# Patient Record
Sex: MALE | Race: WHITE | NOT HISPANIC OR LATINO | Employment: OTHER | URBAN - METROPOLITAN AREA
[De-identification: names, ages, dates, MRNs, and addresses within clinical notes are randomized per-mention and may not be internally consistent; named-entity substitution may affect disease eponyms.]

---

## 2017-01-23 RX ORDER — ZOLPIDEM TARTRATE 10 MG/1
10 TABLET ORAL NIGHTLY PRN
Qty: 30 TABLET | Refills: 5 | Status: SHIPPED | OUTPATIENT
Start: 2017-01-23 | End: 2017-08-04 | Stop reason: SDUPTHER

## 2017-01-23 NOTE — TELEPHONE ENCOUNTER
Last saw Alison on 05/26/2016.     Called in pt's Rx for zolpidem on 1/24/2017.    Called pt and left a VM notifying him that I called in his Rx to his preferred pharmacy.

## 2017-08-04 ENCOUNTER — OFFICE VISIT (OUTPATIENT)
Dept: SLEEP MEDICINE | Facility: CLINIC | Age: 62
End: 2017-08-04
Payer: COMMERCIAL

## 2017-08-04 VITALS
HEIGHT: 72 IN | WEIGHT: 253.5 LBS | DIASTOLIC BLOOD PRESSURE: 73 MMHG | HEART RATE: 68 BPM | SYSTOLIC BLOOD PRESSURE: 128 MMHG | BODY MASS INDEX: 34.34 KG/M2

## 2017-08-04 DIAGNOSIS — G47.00 INSOMNIA, UNSPECIFIED TYPE: ICD-10-CM

## 2017-08-04 DIAGNOSIS — G47.33 OBSTRUCTIVE SLEEP APNEA: Primary | ICD-10-CM

## 2017-08-04 PROCEDURE — 99999 PR PBB SHADOW E&M-EST. PATIENT-LVL III: CPT | Mod: PBBFAC,,, | Performed by: NURSE PRACTITIONER

## 2017-08-04 PROCEDURE — 99213 OFFICE O/P EST LOW 20 MIN: CPT | Mod: S$GLB,,, | Performed by: NURSE PRACTITIONER

## 2017-08-04 PROCEDURE — 3008F BODY MASS INDEX DOCD: CPT | Mod: S$GLB,,, | Performed by: NURSE PRACTITIONER

## 2017-08-04 RX ORDER — ZOLPIDEM TARTRATE 10 MG/1
10 TABLET ORAL NIGHTLY PRN
Qty: 30 TABLET | Refills: 5 | Status: SHIPPED | OUTPATIENT
Start: 2017-08-04 | End: 2018-04-05 | Stop reason: SDUPTHER

## 2017-08-04 NOTE — PROGRESS NOTES
This 61 y.o. male returns today for management of obstructive sleep apnea and difficulty with insomnia. Annual visit. He continues to use PAP therapy nightly. He got new mask since last seen. Mild occasional mask repositioning.  Uses his chin strap. Denies oral drying. Denies nasal drying. Continued improvement of symptoms. Incd' foot pain in interim, using cane.     Sleep initiation difficulty due to ongoing work stress, rumination; persistent thoughts, mild anxiousness--takes ambien prn. No complex sleep behaviors  He continues to use Ambien prn. Has 2 refills left. It remains effective    ESS=5.       BASELINE SLEEP STUDY 2004 RDI 76.8; O2 lizett 86%, titrated to 12cm. Wt 224.6 lbs     Hx  UPPP, septoplasty, sinus surgery    ROS: In addition to sleep symptoms, +occasional sinus congestion, wgt stable, foot pain, otherwise a balance review of systems is negative.           PHYSICAL EXAM:   Blood pressure 128/73, pulse 68, height 6' (1.829 m), weight 115 kg (253 lb 8.5 oz).  Body mass index is 34.38 kg/m².   W,D, obese, well groomed      IMPRESSION:   1. Obstructive sleep apnea with prior symptoms of snoring and excessive daytime sleepiness. CPAP was emperically increased to 13 cm ~ 2014. He remains objectively adherent, but has trouble during times of allergy flare,  limits his response to CPAP, so was switched to apap 2015. No monitoring after setup. 8/4/17: Continued excellent adherence.  Symptoms remain improved.    Medical co-morbidities: Obesity, allergic rhinitis     2. Insomnia NEC. Underlying stress-induce night time rumination - managed with Ambien    PLAN:  1.Continue Auto CPAP 12-18;  [facility] DME prn supplies. Continue nightly use.    2. Continue Zolpidem 10 mg as needed prior to bedtime. Safe use was discussed.   3 Discussed effectiveness of therapy, and potential ramifications of untreated LACHO, including heart disease, hypertension, cognitive difficulties, stroke, and diabetes.    4. RTC otherwise 12-mos,  sooner if needed.

## 2017-09-12 ENCOUNTER — TELEPHONE (OUTPATIENT)
Dept: ORTHOPEDICS | Facility: CLINIC | Age: 62
End: 2017-09-12

## 2017-09-12 NOTE — TELEPHONE ENCOUNTER
----- Message from Sheryl Dobson MA sent at 9/12/2017  3:10 PM CDT -----  Contact: self   PT had a sx on his left ankle on 03/09/2017, pt is having pain and swelling and seeking a second opinion appt. Pt is wanting to get seen as soon as possible. Pt can be reached at [phone].

## 2017-09-12 NOTE — TELEPHONE ENCOUNTER
----- Message from Sheryl Dobson MA sent at 9/12/2017  3:10 PM CDT -----  Contact: self   PT had a sx on his left ankle on 03/09/2017, pt is having pain and swelling and seeking a second opinion appt. Pt is wanting to get seen as soon as possible. Pt can be reached at [phone].

## 2017-09-12 NOTE — TELEPHONE ENCOUNTER
Spoke with pt.  Advised that PA's cannot provide second medical opinions.  Advised pt no sooner appointments available with Dr Rueda.  Pt is on the wait list.  Pt states he had sx earlier this year.  Developed an infection and was treated by ID.  Pt has also be seen for a second medical opinion by Dr Pang.  Pt states Dr Rueda was highly recommended to him.  Pt will keep his currently scheduled appointment.  Pt will work on getting past medical records from previous surgeon.

## 2017-09-12 NOTE — TELEPHONE ENCOUNTER
----- Message from Ayo Biggs sent at 9/12/2017  2:57 PM CDT -----  Contact: Self/Cell:[phone]  Pt called request an appt with Dr. Rueda in regards to pt's lt ankle swelling and pain. I scheduled pt for 10/26 at 8:30 am to see Dr. Rueda, and added pt to wait list. Pt would still like to be seen sooner, and will take any day, but generally Fridays the pt is free. Pt was also scheduled with Liam Downing due to earlier access, but ultimately pt would prefer Dr. Rueda. Please return pt's call at [phone].

## 2017-10-26 ENCOUNTER — OFFICE VISIT (OUTPATIENT)
Dept: ORTHOPEDICS | Facility: CLINIC | Age: 62
End: 2017-10-26
Payer: COMMERCIAL

## 2017-10-26 ENCOUNTER — HOSPITAL ENCOUNTER (OUTPATIENT)
Dept: RADIOLOGY | Facility: HOSPITAL | Age: 62
Discharge: HOME | End: 2017-10-26
Attending: ORTHOPAEDIC SURGERY
Payer: COMMERCIAL

## 2017-10-26 VITALS — BODY MASS INDEX: 33.18 KG/M2 | HEIGHT: 72 IN | WEIGHT: 245 LBS

## 2017-10-26 DIAGNOSIS — M79.671 BILATERAL FOOT PAIN: ICD-10-CM

## 2017-10-26 DIAGNOSIS — M19.072 ARTHRITIS OF FOOT, LEFT: ICD-10-CM

## 2017-10-26 DIAGNOSIS — M79.672 BILATERAL FOOT PAIN: Primary | ICD-10-CM

## 2017-10-26 DIAGNOSIS — M21.41 PES PLANUS OF BOTH FEET: ICD-10-CM

## 2017-10-26 DIAGNOSIS — M79.671 BILATERAL FOOT PAIN: Primary | ICD-10-CM

## 2017-10-26 DIAGNOSIS — M79.672 BILATERAL FOOT PAIN: ICD-10-CM

## 2017-10-26 DIAGNOSIS — M21.42 PES PLANUS OF BOTH FEET: ICD-10-CM

## 2017-10-26 PROCEDURE — 99203 OFFICE O/P NEW LOW 30 MIN: CPT | Mod: S$GLB,,, | Performed by: ORTHOPAEDIC SURGERY

## 2017-10-26 PROCEDURE — 73630 X-RAY EXAM OF FOOT: CPT | Mod: 26,LT,, | Performed by: RADIOLOGY

## 2017-10-26 PROCEDURE — 73630 X-RAY EXAM OF FOOT: CPT | Mod: 26,RT,, | Performed by: RADIOLOGY

## 2017-10-26 PROCEDURE — 99999 PR PBB SHADOW E&M-EST. PATIENT-LVL II: CPT | Mod: PBBFAC,,, | Performed by: ORTHOPAEDIC SURGERY

## 2017-10-26 PROCEDURE — 73630 X-RAY EXAM OF FOOT: CPT | Mod: 50,TC

## 2017-10-26 NOTE — PROGRESS NOTES
CC: bilateral foot pain L>R    HPI: Yuriy Jerome is a 62 y.o. male  presents with bilateral foot pain with the left being more severe. He has hx of flat feet and posterior tibial tendon dysfunction which he was being trx by Dr. Hawley. He underwent a subtalar arthroreisis on 3/2017 with an implant that dislodged and was removed on 5/2017. He then developed an infection which he was seen for via ID and underwent abx trx.  His pain is achy in nature and sharp at times. 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 He has tried custom orthotics in the past with minimal to not relief. He denies any trauma, fever, or chills.     PAST MEDICAL HISTORY:   Past Medical History:   Diagnosis Date    Allergy     GERD (gastroesophageal reflux disease)     Sleep apnea     Staphylococcal infection     left ankle, 2017     PAST SURGICAL HISTORY:   Past Surgical History:   Procedure Laterality Date    FOOT SURGERY Left 05/2017    subtalar implant, 6 weeks later removed due to failure    HERNIA REPAIR      DOUBLE age 8    KNEE SURGERY Bilateral 05/2011    TKR, had ligament replacement in right , meniscus repair both knees    LAMINECTOMY  1989    spinal    UVULOPALATOPHARYNGOPLASTY  2007     FAMILY HISTORY: No family history on file.  SOCIAL HISTORY:   Social History     Social History    Marital status:      Spouse name: N/A    Number of children: N/A    Years of education: N/A     Occupational History    Not on file.     Social History Main Topics    Smoking status: Never Smoker    Smokeless tobacco: Never Used    Alcohol use Yes    Drug use: No    Sexual activity: Not on file     Other Topics Concern    Not on file     Social History Narrative    No narrative on file       MEDICATIONS:   Current Outpatient Prescriptions:     finasteride (PROSCAR) 5 mg tablet, , Disp: , Rfl:     losartan-hydrochlorothiazide 100-12.5 mg (HYZAAR) 100-12.5 mg Tab, Take 1 tablet by mouth  once daily., Disp: , Rfl:     zolpidem (AMBIEN) 10 mg Tab, Take 1 tablet (10 mg total) by mouth nightly as needed., Disp: 30 tablet, Rfl: 5  ALLERGIES: Review of patient's allergies indicates:  No Known Allergies    VITAL SIGNS: Ht 6' (1.829 m)   Wt 111.1 kg (245 lb)   BMI 33.23 kg/m²      Review of Systems   Constitution: Negative. Negative for chills, fever and night sweats.   HENT: Negative for congestion and headaches.    Eyes: Negative for blurred vision, left vision loss and right vision loss.   Cardiovascular: Negative for chest pain and syncope.   Respiratory: Negative for cough and shortness of breath.     Hematologic/Lymphatic: Negative for bleeding problem. Does not bruise/bleed easily.   Skin: Negative for dry skin, itching and rash.   Musculoskeletal: Negative for falls and muscle weakness.   Neurological: Negative for disturbances in coordination, loss of balance and seizures.    Allergic/Immunologic: Negative for hives and persistent infections.       Physical Exam   Constitutional: Oriented to person, place, and time. Appears well-developed and well-nourished.   Head: Normocephalic and atraumatic.   Nose: Nose normal.   Eyes: No scleral icterus.   Neck: Normal range of motion. Neck supple.   Cardiovascular: Normal rate, rythm  Pulm: Breath sounds clear. No audible wheezing   Pulses:DP are 2+ on the right side, and 2+ on the left side.   Neurological: Alert and oriented to person, place, and time.   Skin: Skin is warm. lateral incision over the left foot is well healed  Psychiatric: Normal mood and affect.   Musc: Bilateral foot deformity noted with flat feet L>R noted. Unable to perform single heal raise bilaterally. TTP along the PTT bilaterally. Left hind foot valgus is rigid. Right is correctable. Some pain with ROM of the ankle and subtalar joint which is also decreased on the left.     Imaging:  Xray of the feet bilaterally showing flat feet with subtalar and mid foot arthritis bilaterally.  L>R    Assessment/Plan  Yuriy Jerome is a 62 y.o. male with significant PTTD L>R with hx of infection and previous surgery on the left. Due to him being rigid and his arthritis he would need a triple fusion on the left. He will start with custom orthotics and call us if he wishes to proceed. If he does wish to proceed he would need to see his ID physician to get pre and post operative abx recs.     1. Bilateral foot pain  X-Ray Foot Complete Bilateral   2. Arthritis of foot, left     3. Pes planus of both feet       I have personally taken the history and examined this patient and agree with the residents note as stated above.  Stage 3 left posterior tibial tendon rupture with severe pes planovalgus and hindfoot arthritis.  Recent surgery with sinus tarsi implant failed with subsequent infection with question of bony involvement.  If he wishes to proceed with further surgery, triple arthrodesis, I would recommend pre op evaluation by his infectious disease doctor for perioperative recommendations.

## 2018-03-20 ENCOUNTER — OFFICE VISIT (OUTPATIENT)
Dept: OTOLARYNGOLOGY | Facility: CLINIC | Age: 63
End: 2018-03-20
Payer: COMMERCIAL

## 2018-03-20 VITALS
SYSTOLIC BLOOD PRESSURE: 134 MMHG | HEART RATE: 59 BPM | TEMPERATURE: 95 F | DIASTOLIC BLOOD PRESSURE: 80 MMHG | WEIGHT: 255.06 LBS | BODY MASS INDEX: 34.59 KG/M2

## 2018-03-20 DIAGNOSIS — E04.1 THYROID NODULE: ICD-10-CM

## 2018-03-20 DIAGNOSIS — R59.0 REACTIVE CERVICAL LYMPHADENOPATHY: ICD-10-CM

## 2018-03-20 DIAGNOSIS — K08.89 PAIN, DENTAL: Primary | ICD-10-CM

## 2018-03-20 PROCEDURE — 99244 OFF/OP CNSLTJ NEW/EST MOD 40: CPT | Mod: S$GLB,,, | Performed by: OTOLARYNGOLOGY

## 2018-03-20 PROCEDURE — 99999 PR PBB SHADOW E&M-EST. PATIENT-LVL III: CPT | Mod: PBBFAC,,, | Performed by: OTOLARYNGOLOGY

## 2018-03-20 NOTE — PROGRESS NOTES
Head and Neck Surgery Consult    Seen in consultation from Dr. Hoang    HPI: Yuriy Jerome is a 62 y.o. male presenting with tooth pain after a partial crown. After this procedure he had facial pain and swelling with referred pain to his R ear and neck. He initially had tender lymphadenopathy of his right neck but this has resolved after his dentist Rx antibiotics. He denies fever/chills or bad taste in mouth. He has a remote history of sialadenitis and sialolithiasis on this side, he is unsure if the stone was in his submandibular or parotid gland. He also has a history of a thyroid nodule with benign cytology - all workup for this was at [facility] and they recommended surveillance, but he would like to see our endocrinologists here to continue surveillance. He is asking if further dental manipulation or a root canal would alleviate his pain.    Past Medical History:   Diagnosis Date    Allergy     GERD (gastroesophageal reflux disease)     Sleep apnea     Staphylococcal infection     left ankle, 2017       Past Surgical History:   Procedure Laterality Date    FOOT SURGERY Left 05/2017    subtalar implant, 6 weeks later removed due to failure    HERNIA REPAIR      DOUBLE age 8    KNEE SURGERY Bilateral 05/2011    TKR, had ligament replacement in right , meniscus repair both knees    LAMINECTOMY  1989    spinal    UVULOPALATOPHARYNGOPLASTY  2007         Current Outpatient Prescriptions:     finasteride (PROSCAR) 5 mg tablet, , Disp: , Rfl:     losartan-hydrochlorothiazide 100-12.5 mg (HYZAAR) 100-12.5 mg Tab, Take 1 tablet by mouth once daily., Disp: , Rfl:     zolpidem (AMBIEN) 10 mg Tab, Take 1 tablet (10 mg total) by mouth nightly as needed., Disp: 30 tablet, Rfl: 5    Review of patient's allergies indicates:  No Known Allergies    History reviewed. No pertinent family history.    Social History     Social History    Marital status:      Spouse name: N/A    Number of children: N/A    Years of  education: N/A     Occupational History    Not on file.     Social History Main Topics    Smoking status: Never Smoker    Smokeless tobacco: Never Used    Alcohol use Yes    Drug use: No    Sexual activity: Not on file     Other Topics Concern    Not on file     Social History Narrative    No narrative on file       Review of Systems -  Constitutional: Denies having night sweats, constant fatigue, loss of appetite or recent substantial weight loss.  Eyes: Denies blurred vision or double vision.  Respiratory: Denies symptoms of shortness of breath, noisy breathing, hoarseness or chronic cough.  GI: Denies symptoms of heartburn, acid regurgitation, or the known presence of a hiatal hernia.  The remainder of a 10-point review of systems is negative    REVIEW OF RADIOLOGICAL FILMS AND RECORDS (PERSONALLY REVIEWED):  noncontributory    PHYSICAL EXAM:  Vitals - /80   Pulse (!) 59   Temp (!) 95.2 °F (35.1 °C)   Wt 115.7 kg (255 lb 1.2 oz)   BMI 34.59 kg/m²   Constitutional -      General Appearance: well developed, well nourished, without obvious deformities     Communication: speaks with a normal voice without hoarseness  Head & Face -     Overall: no obvious scars, lesions or masses     Parotid and submandibular glands: no masses or tenderness     Facial strength: normal and equal bilaterally  Eyes -      EOM intact  Ear, Nose, Mouth & Throat -     Ears: both left and right external auditory canals and TM's are normal, no external deformities     Nasal exam: mucosa is pink, septum is midline, visible turbinates are normal on anterior rhinoscopy     Mastication: teeth appear in good repair, some crowns evident. No loose or frankly chipped teeth. Angle I occlusion.     Oral Cavity and oropharynx: mucosa, hard and soft palates, tongue, posterior pharyngeal wall, lips and gums are without lesions. Tonsils appear absent. UPPP changes evident. Clear saliva expressed from [place] and [place] ducts bilaterally.  No palpable stone.  Respiratory:     Breathing unlabored  Larynx: using the mirror for indirect laryngoscopy, the epiglottic, false cords, true cords, and pyriform sinuses are without lesions and the true vocal cords move normally     Neck: appears symmetric, and on palpation is without masses or lymphadenopathy     Thyroid: no asymmetry, thyromegaly, or thyroid nodules on palpation  Cranial Nerves:      II: Pupillary reflexes normal     III, IV, VI: EOM normal     V: 1,2,3: normal sensation     VII: Normal strength in all divisions     IX, X: Normal voice, palatal elevation and sensation     XI: Shoulder strength normal       XII: Tongue mobility normal  Psychiatric:     Appropriate affect    ASSESSMENT: Reactive LAD, resolving    PLAN: Discussed the physiology of reactive lymphadenopathy and that this may persist until the underlying cause of inflammation (i.e. His dental disease) is controlled. I do not know if a root canal or what further dental interventions may be appropriate, this is outside my scope of practice and I encouraged him to follow up with his dentist. I do not see any signs of sialolithiasis or sialadenitis today.I will also refer him to our endocrinology clinic for his thyroid nodule.       Emory Cameron

## 2018-03-20 NOTE — LETTER
March 20, 2018      Yovani Hoang MD  [address]  [place] [address]           [facility]  [address]  [facility] [address]  Phone: [phone]  Fax: [phone]          Patient: Yuriy Jerome   MR Number: [id number]   YOB: 1955   Date of Visit: 3/20/2018       Dear Dr. Yovani Hoang:    Thank you for referring Yuriy Jerome to me for evaluation. Attached you will find relevant portions of my assessment and plan of care.    If you have questions, please do not hesitate to call me. I look forward to following Yuriy Jerome along with you.    Sincerely,    Emory Cameron MD    Enclosure  CC:  No Recipients    If you would like to receive this communication electronically, please contact [email or web address] or [phone] to request more information on [facility] access.    For providers and/or their staff who would like to refer a patient to [facility], please contact us through our one-stop-shop provider referral line, [facility], at [phone].    If you feel you have received this communication in error or would no longer like to receive these types of communications, please e-mail [email or web address]

## 2018-04-05 DIAGNOSIS — G47.00 INSOMNIA, UNSPECIFIED TYPE: ICD-10-CM

## 2018-04-06 RX ORDER — ZOLPIDEM TARTRATE 10 MG/1
TABLET ORAL
Qty: 30 TABLET | Refills: 3 | Status: SHIPPED | OUTPATIENT
Start: 2018-04-06 | End: 2019-09-05

## 2018-08-24 ENCOUNTER — OFFICE VISIT (OUTPATIENT)
Dept: SLEEP MEDICINE | Facility: CLINIC | Age: 63
End: 2018-08-24
Payer: COMMERCIAL

## 2018-08-24 VITALS
SYSTOLIC BLOOD PRESSURE: 125 MMHG | HEIGHT: 72 IN | HEART RATE: 61 BPM | DIASTOLIC BLOOD PRESSURE: 78 MMHG | BODY MASS INDEX: 34.55 KG/M2 | WEIGHT: 255.06 LBS

## 2018-08-24 DIAGNOSIS — G47.33 OBSTRUCTIVE SLEEP APNEA: Primary | ICD-10-CM

## 2018-08-24 DIAGNOSIS — G47.00 INSOMNIA, UNSPECIFIED TYPE: ICD-10-CM

## 2018-08-24 PROCEDURE — 99999 PR PBB SHADOW E&M-EST. PATIENT-LVL III: CPT | Mod: PBBFAC,,, | Performed by: NURSE PRACTITIONER

## 2018-08-24 PROCEDURE — 99213 OFFICE O/P EST LOW 20 MIN: CPT | Mod: S$GLB,,, | Performed by: NURSE PRACTITIONER

## 2018-08-24 PROCEDURE — 3008F BODY MASS INDEX DOCD: CPT | Mod: CPTII,S$GLB,, | Performed by: NURSE PRACTITIONER

## 2018-08-24 RX ORDER — TRAZODONE HYDROCHLORIDE 50 MG/1
50-100 TABLET ORAL NIGHTLY
Qty: 60 TABLET | Refills: 5 | Status: SHIPPED | OUTPATIENT
Start: 2018-08-24 | End: 2019-02-11 | Stop reason: SDUPTHER

## 2018-08-24 NOTE — PROGRESS NOTES
"This 62 y.o. male returns today for management of obstructive sleep apnea and difficulty with insomnia. Annual visit. He continues to use PAP therapy nightly. "Has not gotten recent supplies, needs more micropore filters than DME offers also. Feels mentally fuzzy despite 8h sleep at times w or w/o taking Ambien. Still can take hrs to fall asleep. 2# gain. Uses his chin strap. Denies oral drying. Denies nasal drying. Continued improvement of symptoms.     Sleep initiation difficulty due to ongoing work stress, rumination; persistent thoughts, mild anxiousness--takes ambien prn. No complex sleep behaviors  He continues to use Ambien prn.    Interrogation- AHI 1.1, 0% periodic, avg 7:12h/n. dreamwear mask. 30/30d>4h. 90% tile 13cm    BASELINE SLEEP STUDY 2004 RDI 76.8; O2 lizett 86%, titrated to 12cm. Wt 224.6 lbs     Hx  UPPP, septoplasty, sinus surgery    ROS: In addition to sleep symptoms, +occasional sinus congestion, foot pain, otherwise a balance review of systems is negative.         PHYSICAL EXAM:   Blood pressure 125/78, pulse 61, height 6' (1.829 m), weight 115.7 kg (255 lb 1.2 oz).  Body mass index is 34.59 kg/m².   W,D, obese, well groomed      IMPRESSION:   1. Obstructive sleep apnea with prior symptoms of snoring and excessive daytime sleepiness. CPAP was emperically increased to 13 cm ~ 2014. He remains objectively adherent, but has trouble during times of allergy flare,  limits his response to CPAP, so was switched to apap 2015. No monitoring after setup. 8/4/17: Continued excellent adherence.  Symptoms remain improved. 8/24/18: Continued adherence, AHI<5    Medical co-morbidities: Obesity, allergic rhinitis     2. Insomnia NEC. Underlying stress-induced night time rumination - managed with Ambien, s/e    PLAN:  1.Continue Auto CPAP 12-18;  [facility] DME prn supplies. Continue nightly use.    2. stop Zolpidem 10 mg and trial Trazadone 50mg 1-2 qhs, discussed purpose and again enc'd improved sleep hygiene and " CBT-I as most effective long term, consdier journaling evening time 30min outside of bedroom and progressive musscle relaxation or wgted blanket (anecdotal)  3 Discussed effectiveness of therapy, and potential ramifications of untreated LACHO, including heart disease, hypertension, cognitive difficulties, stroke, and diabetes.    4. RTC otherwise 12-mos, sooner if needed.

## 2019-02-12 RX ORDER — TRAZODONE HYDROCHLORIDE 50 MG/1
50-100 TABLET ORAL NIGHTLY
Qty: 60 TABLET | Refills: 5 | Status: SHIPPED | OUTPATIENT
Start: 2019-02-12 | End: 2019-06-14 | Stop reason: SDUPTHER

## 2019-06-14 RX ORDER — TRAZODONE HYDROCHLORIDE 50 MG/1
TABLET ORAL
Qty: 180 TABLET | Refills: 5 | Status: SHIPPED | OUTPATIENT
Start: 2019-06-14 | End: 2019-09-05 | Stop reason: SDUPTHER

## 2019-08-06 RX ORDER — TRAZODONE HYDROCHLORIDE 50 MG/1
TABLET ORAL
Qty: 60 TABLET | Refills: 1 | Status: SHIPPED | OUTPATIENT
Start: 2019-08-06 | End: 2019-10-05 | Stop reason: SDUPTHER

## 2019-09-05 ENCOUNTER — OFFICE VISIT (OUTPATIENT)
Dept: SLEEP MEDICINE | Facility: CLINIC | Age: 64
End: 2019-09-05
Payer: COMMERCIAL

## 2019-09-05 VITALS
DIASTOLIC BLOOD PRESSURE: 73 MMHG | HEART RATE: 58 BPM | SYSTOLIC BLOOD PRESSURE: 118 MMHG | BODY MASS INDEX: 33.97 KG/M2 | HEIGHT: 72 IN | WEIGHT: 250.81 LBS

## 2019-09-05 DIAGNOSIS — G47.00 INSOMNIA, UNSPECIFIED TYPE: ICD-10-CM

## 2019-09-05 DIAGNOSIS — G47.33 OBSTRUCTIVE SLEEP APNEA: Primary | ICD-10-CM

## 2019-09-05 DIAGNOSIS — E66.9 OBESITY (BMI 30.0-34.9): ICD-10-CM

## 2019-09-05 PROCEDURE — 99999 PR PBB SHADOW E&M-EST. PATIENT-LVL III: ICD-10-PCS | Mod: PBBFAC,,, | Performed by: NURSE PRACTITIONER

## 2019-09-05 PROCEDURE — 3008F BODY MASS INDEX DOCD: CPT | Mod: CPTII,S$GLB,, | Performed by: NURSE PRACTITIONER

## 2019-09-05 PROCEDURE — 99214 PR OFFICE/OUTPT VISIT, EST, LEVL IV, 30-39 MIN: ICD-10-PCS | Mod: S$GLB,,, | Performed by: NURSE PRACTITIONER

## 2019-09-05 PROCEDURE — 3008F PR BODY MASS INDEX (BMI) DOCUMENTED: ICD-10-PCS | Mod: CPTII,S$GLB,, | Performed by: NURSE PRACTITIONER

## 2019-09-05 PROCEDURE — 99214 OFFICE O/P EST MOD 30 MIN: CPT | Mod: S$GLB,,, | Performed by: NURSE PRACTITIONER

## 2019-09-05 PROCEDURE — 99999 PR PBB SHADOW E&M-EST. PATIENT-LVL III: CPT | Mod: PBBFAC,,, | Performed by: NURSE PRACTITIONER

## 2019-09-05 NOTE — PROGRESS NOTES
This 63 y.o. male returns today for management of obstructive sleep apnea and difficulty with insomnia. Annual visit. He continues to use PAP therapy nightly. Having mask leaks/adjusting mask throughout night. Uses his chin strap. Denies oral drying. Denies nasal drying. Continued improvement of symptoms overall. Getting regular supplies. More sinus infections, wondering about cleaning machines. 5# loss.     Sleep initiation difficulty due to ongoing work stress, rumination; persistent thoughts, mild anxiousness--takes ambien prn. No complex sleep behaviors  Stopped ambien, trazadone typically 1 50mg helps sleep onset    Previous Interrogation- AHI 1.1, 0% periodic, avg 7:12h/n. dreamwear mask. 30/30d>4h. 90% tile 13cm    BASELINE SLEEP STUDY 2004 RDI 76.8; O2 lizett 86%, titrated to 12cm. Wt 224.6 lbs     Hx  UPPP, septoplasty, sinus surgery    ROS: In addition to sleep symptoms, +seasonal but more recently sinus infections,  otherwise a balance review of systems is negative.         PHYSICAL EXAM:   Blood pressure 118/73, pulse (!) 58, height 6' (1.829 m), weight 113.8 kg (250 lb 12.8 oz).  Body mass index is 34.01 kg/m².   W,D, obese, well groomed      IMPRESSION:   1. Obstructive sleep apnea with prior symptoms of snoring and excessive daytime sleepiness. CPAP was emperically increased to 13 cm ~ 2014. He remains objectively adherent, but has trouble during times of allergy flare,  limits his response to CPAP, so was switched to apap 2015. No monitoring after setup. 8/4/17: Continued excellent adherence.  Symptoms remain improved. 8/24/18 and 9/5/19: Continued adherence, AHI<5    Medical co-morbidities: Obesity, allergic rhinitis     2. Insomnia NEC. Underlying stress-induced night time rumination - managed with trazadone    PLAN:  1.Continue Auto CPAP 12-18;  [facility] DME prn supplies. Continue nightly use.  Consider alternative type mask/provided nuance sample and dreamwear pillow s and large size, cleaning  machine  2. continue Trazadone 50mg 1-2 qhs  3 Discussed effectiveness of therapy, and potential ramifications of untreated LACHO, including heart disease, hypertension, cognitive difficulties, stroke, and diabetes.  Encouraged continued weight loss efforts for potential improvement of LACHO and overall health benefits    4. RTC otherwise 12-mos, sooner if needed.

## 2019-10-08 RX ORDER — TRAZODONE HYDROCHLORIDE 50 MG/1
TABLET ORAL
Qty: 60 TABLET | Refills: 5 | Status: SHIPPED | OUTPATIENT
Start: 2019-10-08 | End: 2020-06-02

## 2020-08-06 ENCOUNTER — OFFICE VISIT (OUTPATIENT)
Dept: SLEEP MEDICINE | Facility: CLINIC | Age: 65
End: 2020-08-06
Payer: COMMERCIAL

## 2020-08-06 VITALS
SYSTOLIC BLOOD PRESSURE: 141 MMHG | WEIGHT: 258 LBS | BODY MASS INDEX: 34.95 KG/M2 | DIASTOLIC BLOOD PRESSURE: 64 MMHG | HEIGHT: 72 IN | HEART RATE: 72 BPM

## 2020-08-06 DIAGNOSIS — G47.00 INSOMNIA, UNSPECIFIED TYPE: ICD-10-CM

## 2020-08-06 DIAGNOSIS — G47.33 OBSTRUCTIVE SLEEP APNEA: Primary | ICD-10-CM

## 2020-08-06 PROCEDURE — 99213 PR OFFICE/OUTPT VISIT, EST, LEVL III, 20-29 MIN: ICD-10-PCS | Mod: S$GLB,,, | Performed by: NURSE PRACTITIONER

## 2020-08-06 PROCEDURE — 3008F BODY MASS INDEX DOCD: CPT | Mod: CPTII,S$GLB,, | Performed by: NURSE PRACTITIONER

## 2020-08-06 PROCEDURE — 99999 PR PBB SHADOW E&M-EST. PATIENT-LVL III: CPT | Mod: PBBFAC,,, | Performed by: NURSE PRACTITIONER

## 2020-08-06 PROCEDURE — 99213 OFFICE O/P EST LOW 20 MIN: CPT | Mod: S$GLB,,, | Performed by: NURSE PRACTITIONER

## 2020-08-06 PROCEDURE — 3008F PR BODY MASS INDEX (BMI) DOCUMENTED: ICD-10-PCS | Mod: CPTII,S$GLB,, | Performed by: NURSE PRACTITIONER

## 2020-08-06 PROCEDURE — 99999 PR PBB SHADOW E&M-EST. PATIENT-LVL III: ICD-10-PCS | Mod: PBBFAC,,, | Performed by: NURSE PRACTITIONER

## 2020-09-27 ENCOUNTER — OFFICE VISIT (OUTPATIENT)
Dept: URGENT CARE | Facility: CLINIC | Age: 65
End: 2020-09-27
Payer: COMMERCIAL

## 2020-09-27 VITALS
SYSTOLIC BLOOD PRESSURE: 136 MMHG | HEART RATE: 78 BPM | BODY MASS INDEX: 34.95 KG/M2 | TEMPERATURE: 97 F | RESPIRATION RATE: 18 BRPM | WEIGHT: 258 LBS | DIASTOLIC BLOOD PRESSURE: 88 MMHG | HEIGHT: 72 IN | OXYGEN SATURATION: 98 %

## 2020-09-27 DIAGNOSIS — Z20.822 EXPOSURE TO COVID-19 VIRUS: ICD-10-CM

## 2020-09-27 DIAGNOSIS — J30.9 ALLERGIC RHINITIS, UNSPECIFIED SEASONALITY, UNSPECIFIED TRIGGER: ICD-10-CM

## 2020-09-27 DIAGNOSIS — R53.83 FATIGUE, UNSPECIFIED TYPE: Primary | ICD-10-CM

## 2020-09-27 LAB
CTP QC/QA: YES
SARS-COV-2 RDRP RESP QL NAA+PROBE: NEGATIVE

## 2020-09-27 PROCEDURE — 99203 OFFICE O/P NEW LOW 30 MIN: CPT | Mod: S$GLB,,, | Performed by: NURSE PRACTITIONER

## 2020-09-27 PROCEDURE — U0002: ICD-10-PCS | Mod: QW,S$GLB,, | Performed by: NURSE PRACTITIONER

## 2020-09-27 PROCEDURE — 99203 PR OFFICE/OUTPT VISIT, NEW, LEVL III, 30-44 MIN: ICD-10-PCS | Mod: S$GLB,,, | Performed by: NURSE PRACTITIONER

## 2020-09-27 PROCEDURE — U0002 COVID-19 LAB TEST NON-CDC: HCPCS | Mod: QW,S$GLB,, | Performed by: NURSE PRACTITIONER

## 2020-09-27 RX ORDER — AZELASTINE 1 MG/ML
1 SPRAY, METERED NASAL 2 TIMES DAILY
Qty: 30 ML | Refills: 0 | Status: SHIPPED | OUTPATIENT
Start: 2020-09-27 | End: 2022-03-10

## 2020-09-27 RX ORDER — AMLODIPINE BESYLATE 2.5 MG/1
TABLET ORAL
COMMUNITY
Start: 2020-09-08 | End: 2021-12-15 | Stop reason: SDUPTHER

## 2020-09-27 RX ORDER — AMLODIPINE BESYLATE 2.5 MG/1
2.5 TABLET ORAL
COMMUNITY
Start: 2020-01-22 | End: 2021-12-15

## 2020-09-27 NOTE — PROGRESS NOTES
Subjective:       Patient ID: Yuriy Jerome is a 64 y.o. male.    Vitals:  height is 6' (1.829 m) and weight is 117 kg (258 lb). His temperature is 97.2 °F (36.2 °C). His blood pressure is 136/88 and his pulse is 78. His respiration is 18 and oxygen saturation is 98%.     Chief Complaint: COVID-19 Concerns    Patient said he hasn't felt well for a week. Was exposed to covid this week.  Provider note begins below  Patient has had blood pressure has been taken Sudafed.  Patient has seasonal allergies.  Patient does state take allergy medications.  He does have a history of prostate issues.    Fatigue  This is a new problem. The current episode started in the past 7 days. The problem occurs constantly. The problem has been gradually worsening. Associated symptoms include congestion, coughing, fatigue, headaches and a sore throat. Pertinent negatives include no arthralgias, chest pain, chills, fever, joint swelling, myalgias, nausea, rash, vertigo or vomiting. Treatments tried: sudefed and ibuprofen. The treatment provided no relief.       Constitution: Positive for fatigue. Negative for chills and fever.   HENT: Positive for congestion and sore throat.    Neck: Negative for painful lymph nodes.   Cardiovascular: Negative for chest pain and leg swelling.   Eyes: Negative for double vision and blurred vision.   Respiratory: Positive for cough. Negative for shortness of breath.    Gastrointestinal: Negative for nausea, vomiting and diarrhea.   Genitourinary: Negative for dysuria, frequency and urgency.   Musculoskeletal: Negative for joint pain, joint swelling, muscle cramps and muscle ache.   Skin: Negative for color change, pale and rash.   Allergic/Immunologic: Negative for seasonal allergies.   Neurological: Positive for dizziness, light-headedness and headaches. Negative for history of vertigo and passing out.   Hematologic/Lymphatic: Negative for swollen lymph nodes, easy bruising/bleeding and history of blood  clots. Does not bruise/bleed easily.   Psychiatric/Behavioral: Negative for nervous/anxious, sleep disturbance and depression. The patient is not nervous/anxious.        Objective:      Physical Exam   Constitutional: He is oriented to person, place, and time.   HENT:   Head: Normocephalic and atraumatic.   Cardiovascular: Bradycardia present.   Pulmonary/Chest: Effort normal. No respiratory distress.   Abdominal: Normal appearance.   Neurological: He is alert and oriented to person, place, and time.   Skin: Skin is dry. Psychiatric: His behavior is normal. Mood normal.     Results for orders placed or performed in visit on 09/27/20   POCT COVID-19 Rapid Screening   Result Value Ref Range    POC Rapid COVID Negative Negative     Acceptable Yes          Assessment:       1. Fatigue, unspecified type    2. Exposure to Covid-19 Virus    3. Allergic rhinitis, unspecified seasonality, unspecified trigger        Plan:       Will add Astelin to Flonase that he has already taken.  Patient may take Coricidin for sinus congestion or cough.    Your test was NEGATIVE for COVID-19 (coronavirus).      You may leave home and/or return to work when the following conditions are met:   24 hours fever free without fever-reducing medications AND   Improved symptoms      If your symptoms worsen or if you have any other concerns, please contact [facility] at [phone].       Fatigue, unspecified type  -     POCT COVID-19 Rapid Screening    Exposure to Covid-19 Virus    Allergic rhinitis, unspecified seasonality, unspecified trigger  -     azelastine (ASTELIN) 137 mcg (0.1 %) nasal spray; 1 spray (137 mcg total) by Nasal route 2 (two) times daily. for 7 days  Dispense: 30 mL; Refill: 0         Patient Instructions   Guidelines for General Prevention of COVID-19    o Take steps to protect yourself from COVID-19. Perform hand hygiene frequently. Wash your hands often with soap and water for at least 20 seconds of  use and alcohol-based hand , covering all surfaces of your hands and rubbing them together until they feel dry.  o Avoid touching your eyes, nose, and mouth with unwashed hands.  o Avoid close contact with people and stay home if youre sick, except to get medical care.   o Cover coughs and sneezes with a tissue, or use the inside of your elbow. Immediately wash your hands or use hand .     For more information, see CDC link below:    https://www.cdc.gov/coronavirus/2019-ncov/hcp/guidance-prevent-spread.html#precautions

## 2020-11-10 ENCOUNTER — TELEPHONE (OUTPATIENT)
Dept: UROLOGY | Facility: CLINIC | Age: 65
End: 2020-11-10

## 2020-11-10 NOTE — TELEPHONE ENCOUNTER
----- Message from Soraida Cha sent at 11/10/2020 10:17 AM CST -----  Contact: SUJATA MUJICA [[id number]]  Type: Patient Call Back    Who called:SUJATA MUJICA [[id number]]    What is the request in detail: Patient is requesting a call back. SUJATA MUJICA [[id number]] states he was a former pt of Dr. Greene and would like to know if someone in the office can give him in a call in regards to getting a medication refilled.  Please advise.    Can the clinic reply by [facility]? No    Best call back number: ..[phone]    Additional Information: N/A

## 2020-11-10 NOTE — TELEPHONE ENCOUNTER
Called and informed patient that he could ask his PCP to refill his medication until he sees Dr Greene.  Patient has net seen Dr Greene for many years.  Patient does have appointment scheduled.  Patient verbalized understanding

## 2020-12-08 ENCOUNTER — OFFICE VISIT (OUTPATIENT)
Dept: UROLOGY | Facility: CLINIC | Age: 65
End: 2020-12-08
Payer: MEDICARE

## 2020-12-08 VITALS
HEIGHT: 72 IN | BODY MASS INDEX: 34.95 KG/M2 | SYSTOLIC BLOOD PRESSURE: 138 MMHG | HEART RATE: 69 BPM | DIASTOLIC BLOOD PRESSURE: 81 MMHG | WEIGHT: 258 LBS

## 2020-12-08 DIAGNOSIS — N40.0 ENLARGED PROSTATE: Primary | ICD-10-CM

## 2020-12-08 DIAGNOSIS — N32.81 OAB (OVERACTIVE BLADDER): ICD-10-CM

## 2020-12-08 LAB
BILIRUB SERPL-MCNC: NEGATIVE MG/DL
BLOOD URINE, POC: NEGATIVE
CLARITY, POC UA: CLEAR
COLOR, POC UA: YELLOW
GLUCOSE UR QL STRIP: NORMAL
KETONES UR QL STRIP: NEGATIVE
LEUKOCYTE ESTERASE URINE, POC: NEGATIVE
NITRITE, POC UA: NEGATIVE
PH, POC UA: 5
PROTEIN, POC: NORMAL
SPECIFIC GRAVITY, POC UA: 1.03
UROBILINOGEN, POC UA: NORMAL

## 2020-12-08 PROCEDURE — 3008F BODY MASS INDEX DOCD: CPT | Mod: CPTII,S$GLB,, | Performed by: UROLOGY

## 2020-12-08 PROCEDURE — 81002 URINALYSIS NONAUTO W/O SCOPE: CPT | Mod: S$GLB,,, | Performed by: UROLOGY

## 2020-12-08 PROCEDURE — 3288F PR FALLS RISK ASSESSMENT DOCUMENTED: ICD-10-PCS | Mod: CPTII,S$GLB,, | Performed by: UROLOGY

## 2020-12-08 PROCEDURE — 1101F PT FALLS ASSESS-DOCD LE1/YR: CPT | Mod: CPTII,S$GLB,, | Performed by: UROLOGY

## 2020-12-08 PROCEDURE — 1126F AMNT PAIN NOTED NONE PRSNT: CPT | Mod: S$GLB,,, | Performed by: UROLOGY

## 2020-12-08 PROCEDURE — 3008F PR BODY MASS INDEX (BMI) DOCUMENTED: ICD-10-PCS | Mod: CPTII,S$GLB,, | Performed by: UROLOGY

## 2020-12-08 PROCEDURE — 81002 POCT URINE DIPSTICK WITHOUT MICROSCOPE: ICD-10-PCS | Mod: S$GLB,,, | Performed by: UROLOGY

## 2020-12-08 PROCEDURE — 99204 OFFICE O/P NEW MOD 45 MIN: CPT | Mod: 25,S$GLB,, | Performed by: UROLOGY

## 2020-12-08 PROCEDURE — 1126F PR PAIN SEVERITY QUANTIFIED, NO PAIN PRESENT: ICD-10-PCS | Mod: S$GLB,,, | Performed by: UROLOGY

## 2020-12-08 PROCEDURE — 3288F FALL RISK ASSESSMENT DOCD: CPT | Mod: CPTII,S$GLB,, | Performed by: UROLOGY

## 2020-12-08 PROCEDURE — 1101F PR PT FALLS ASSESS DOC 0-1 FALLS W/OUT INJ PAST YR: ICD-10-PCS | Mod: CPTII,S$GLB,, | Performed by: UROLOGY

## 2020-12-08 PROCEDURE — 99204 PR OFFICE/OUTPT VISIT, NEW, LEVL IV, 45-59 MIN: ICD-10-PCS | Mod: 25,S$GLB,, | Performed by: UROLOGY

## 2020-12-08 RX ORDER — METFORMIN HYDROCHLORIDE 500 MG/1
500 TABLET ORAL
COMMUNITY
Start: 2020-01-22 | End: 2021-12-15 | Stop reason: SDUPTHER

## 2020-12-08 RX ORDER — BUDESONIDE 0.5 MG/2ML
INHALANT ORAL
COMMUNITY
Start: 2020-11-11 | End: 2021-12-15

## 2020-12-08 RX ORDER — FINASTERIDE 5 MG/1
5 TABLET, FILM COATED ORAL DAILY
Qty: 90 TABLET | Refills: 3 | Status: SHIPPED | OUTPATIENT
Start: 2020-12-08 | End: 2021-12-07 | Stop reason: SDUPTHER

## 2020-12-08 RX ORDER — FLUTICASONE PROPIONATE 50 MCG
SPRAY, SUSPENSION (ML) NASAL
COMMUNITY

## 2020-12-08 RX ORDER — AMOXICILLIN 500 MG
CAPSULE ORAL DAILY
COMMUNITY

## 2020-12-08 RX ORDER — ROSUVASTATIN CALCIUM 10 MG/1
20 TABLET, COATED ORAL DAILY
COMMUNITY
Start: 2020-11-04 | End: 2021-12-15 | Stop reason: SDUPTHER

## 2020-12-08 RX ORDER — SODIUM, POTASSIUM,MAG SULFATES 17.5-3.13G
SOLUTION, RECONSTITUTED, ORAL ORAL
COMMUNITY
Start: 2020-11-09 | End: 2021-12-15

## 2020-12-08 RX ORDER — BUDESONIDE 1 MG/2ML
INHALANT ORAL
COMMUNITY
Start: 2020-12-03 | End: 2022-08-26

## 2020-12-08 RX ORDER — INFLUENZA A VIRUS A/VICTORIA/2454/2019 IVR-207 (H1N1) ANTIGEN (PROPIOLACTONE INACTIVATED), INFLUENZA A VIRUS A/HONG KONG/2671/2019 IVR-208 (H3N2) ANTIGEN (PROPIOLACTONE INACTIVATED), INFLUENZA B VIRUS B/VICTORIA/705/2018 BVR-11 ANTIGEN (PROPIOLACTONE INACTIVATED), INFLUENZA B VIRUS B/PHUKET/3073/2013 BVR-1B ANTIGEN (PROPIOLACTONE INACTIVATED) 15; 15; 15; 15 UG/.5ML; UG/.5ML; UG/.5ML; UG/.5ML
INJECTION, SUSPENSION INTRAMUSCULAR
COMMUNITY
Start: 2020-09-27 | End: 2021-12-15

## 2020-12-08 NOTE — PROGRESS NOTES
Subjective:      Yuriy Jerome is a 65 y.o. male who was self-referred for evaluation of enlarged prostate    Last saw me over 6 years ago at [facility]  No records    Requests refill of proscar.    Has been seeing Dr Jalloh over the last several years    Per pt, he had a prostate biopsy which I performed about 7 years ago  He says this was neg for ca but there was a hemangioma.    Some urgency  Nocturia*1    ++caffeine        The following portions of the patient's history were reviewed and updated as appropriate: allergies, current medications, past family history, past medical history, past social history, past surgical history and problem list.    Review of Systems    Constitutional: no fever or chills  ENT: no nasal congestion or sore throat  Respiratory: no cough or shortness of breath  Cardiovascular: no chest pain or palpitations  Gastrointestinal: no nausea or vomiting, tolerating diet  Genitourinary: as per HPI  Hematologic/Lymphatic: no easy bruising or lymphadenopathy  Musculoskeletal: no arthralgias or myalgias  Neurological: no seizures or tremors  Behavioral/Psych: no auditory or visual hallucinations     Objective:   Vitals: /81 (BP Location: Right arm, Patient Position: Sitting, BP Method: Large (Automatic))   Pulse 69   Ht 6' (1.829 m)   Wt 117 kg (258 lb)   BMI 34.99 kg/m²     Physical Exam   General: alert and oriented, no acute distress  Head: normocephalic, atraumatic  Neck: normal ROM  Respiratory: Symmetric expansion, non-labored breathing  Cardiovascular: no peripheral edema  Abdomen: soft, non tender, non distended, no palpable masses, no hernias, no hepatomegaly or splenomegaly  Genitourinary:   Penis: normal, no lesions, patent orthotopic meatus, no plaques  Scrotum: no rashes or skin changes;   Testes: descended bilaterally, no masses, nontender, normal epididymides bilaterally, no hydroceles  Prostate: see diagram, 40g nodule on right unchanged; very prominent; seminal vesicles not  palpated  Rectum: normal rectal tone, no rectal mass, normal perineum  Lymphatic: no inguinal nodes  Skin: normal coloration and turgor, no rashes, no suspicious skin lesions noted  Neuro: alert and oriented x3, no gross deficits  Psych: normal judgment and insight, normal mood/affect and non-anxious    Physical Exam    Lab Review   Urinalysis demonstrates negative for all components  Lab Results   Component Value Date    WBC 5.74 01/19/2012    HGB 15.9 01/19/2012    HCT 46.2 01/19/2012    MCV 93.9 01/19/2012     01/19/2012     Lab Results   Component Value Date    CREATININE 1.0 01/19/2012    BUN 15 01/19/2012     Lab Results   Component Value Date    PSA 7.4 (H) 10/09/2009     Imaging  -  Assessment:     1. Enlarged prostate    2. OAB (overactive bladder)    3.  Prostate hemangioma  -  Plan:     Orders Placed This Encounter    Prostate Specific Antigen, Diagnostic    POCT URINE DIPSTICK WITHOUT MICROSCOPE    mirabegron (MYRBETRIQ) 25 mg Tb24 ER tablet    finasteride (PROSCAR) 5 mg tablet     rtc 3 months with psa  [facility] urology records and/or [facility] records

## 2020-12-11 RX ORDER — TRAZODONE HYDROCHLORIDE 50 MG/1
TABLET ORAL
Qty: 60 TABLET | Refills: 5 | Status: SHIPPED | OUTPATIENT
Start: 2020-12-11 | End: 2021-05-28 | Stop reason: SDUPTHER

## 2020-12-21 LAB — CRC RECOMMENDATION EXT: NORMAL

## 2021-01-11 ENCOUNTER — LAB VISIT (OUTPATIENT)
Dept: PRIMARY CARE CLINIC | Facility: OTHER | Age: 66
End: 2021-01-11
Attending: INTERNAL MEDICINE
Payer: MEDICARE

## 2021-01-11 DIAGNOSIS — R09.81 CONGESTION OF NASAL SINUS: ICD-10-CM

## 2021-01-11 DIAGNOSIS — R51.9 HEAD ACHE: ICD-10-CM

## 2021-01-11 PROCEDURE — U0003 INFECTIOUS AGENT DETECTION BY NUCLEIC ACID (DNA OR RNA); SEVERE ACUTE RESPIRATORY SYNDROME CORONAVIRUS 2 (SARS-COV-2) (CORONAVIRUS DISEASE [COVID-19]), AMPLIFIED PROBE TECHNIQUE, MAKING USE OF HIGH THROUGHPUT TECHNOLOGIES AS DESCRIBED BY CMS-2020-01-R: HCPCS

## 2021-01-12 LAB — SARS-COV-2 RNA RESP QL NAA+PROBE: NOT DETECTED

## 2021-03-03 ENCOUNTER — LAB VISIT (OUTPATIENT)
Dept: LAB | Facility: HOSPITAL | Age: 66
End: 2021-03-03
Attending: INTERNAL MEDICINE
Payer: MEDICARE

## 2021-03-03 DIAGNOSIS — N32.81 OAB (OVERACTIVE BLADDER): ICD-10-CM

## 2021-03-03 DIAGNOSIS — N40.0 ENLARGED PROSTATE: ICD-10-CM

## 2021-03-03 LAB — COMPLEXED PSA SERPL-MCNC: 3.8 NG/ML (ref 0–4)

## 2021-03-03 PROCEDURE — 36415 COLL VENOUS BLD VENIPUNCTURE: CPT | Mod: PO | Performed by: UROLOGY

## 2021-03-03 PROCEDURE — 84153 ASSAY OF PSA TOTAL: CPT | Performed by: UROLOGY

## 2021-03-09 ENCOUNTER — OFFICE VISIT (OUTPATIENT)
Dept: UROLOGY | Facility: CLINIC | Age: 66
End: 2021-03-09
Payer: MEDICARE

## 2021-03-09 VITALS
HEART RATE: 70 BPM | BODY MASS INDEX: 34.95 KG/M2 | DIASTOLIC BLOOD PRESSURE: 78 MMHG | HEIGHT: 72 IN | SYSTOLIC BLOOD PRESSURE: 121 MMHG | WEIGHT: 258 LBS

## 2021-03-09 DIAGNOSIS — N40.0 ENLARGED PROSTATE: Primary | ICD-10-CM

## 2021-03-09 DIAGNOSIS — N32.81 OAB (OVERACTIVE BLADDER): ICD-10-CM

## 2021-03-09 PROCEDURE — 1101F PR PT FALLS ASSESS DOC 0-1 FALLS W/OUT INJ PAST YR: ICD-10-PCS | Mod: CPTII,S$GLB,, | Performed by: UROLOGY

## 2021-03-09 PROCEDURE — 1126F PR PAIN SEVERITY QUANTIFIED, NO PAIN PRESENT: ICD-10-PCS | Mod: S$GLB,,, | Performed by: UROLOGY

## 2021-03-09 PROCEDURE — 99214 OFFICE O/P EST MOD 30 MIN: CPT | Mod: S$GLB,,, | Performed by: UROLOGY

## 2021-03-09 PROCEDURE — 1126F AMNT PAIN NOTED NONE PRSNT: CPT | Mod: S$GLB,,, | Performed by: UROLOGY

## 2021-03-09 PROCEDURE — 1101F PT FALLS ASSESS-DOCD LE1/YR: CPT | Mod: CPTII,S$GLB,, | Performed by: UROLOGY

## 2021-03-09 PROCEDURE — 3288F PR FALLS RISK ASSESSMENT DOCUMENTED: ICD-10-PCS | Mod: CPTII,S$GLB,, | Performed by: UROLOGY

## 2021-03-09 PROCEDURE — 3008F BODY MASS INDEX DOCD: CPT | Mod: CPTII,S$GLB,, | Performed by: UROLOGY

## 2021-03-09 PROCEDURE — 3288F FALL RISK ASSESSMENT DOCD: CPT | Mod: CPTII,S$GLB,, | Performed by: UROLOGY

## 2021-03-09 PROCEDURE — 99214 PR OFFICE/OUTPT VISIT, EST, LEVL IV, 30-39 MIN: ICD-10-PCS | Mod: S$GLB,,, | Performed by: UROLOGY

## 2021-03-09 PROCEDURE — 3008F PR BODY MASS INDEX (BMI) DOCUMENTED: ICD-10-PCS | Mod: CPTII,S$GLB,, | Performed by: UROLOGY

## 2021-05-28 RX ORDER — TRAZODONE HYDROCHLORIDE 50 MG/1
TABLET ORAL
Qty: 60 TABLET | Refills: 5 | Status: SHIPPED | OUTPATIENT
Start: 2021-05-28 | End: 2022-08-26

## 2021-08-03 ENCOUNTER — LAB VISIT (OUTPATIENT)
Dept: PRIMARY CARE CLINIC | Facility: OTHER | Age: 66
End: 2021-08-03
Attending: INTERNAL MEDICINE
Payer: MEDICARE

## 2021-08-03 DIAGNOSIS — R05.9 COUGH: ICD-10-CM

## 2021-08-03 DIAGNOSIS — M79.10 MUSCLE PAIN: ICD-10-CM

## 2021-08-03 DIAGNOSIS — Z20.822 ENCOUNTER FOR LABORATORY TESTING FOR COVID-19 VIRUS: ICD-10-CM

## 2021-08-03 DIAGNOSIS — R51.9 HEAD ACHE: ICD-10-CM

## 2021-08-03 PROCEDURE — U0003 INFECTIOUS AGENT DETECTION BY NUCLEIC ACID (DNA OR RNA); SEVERE ACUTE RESPIRATORY SYNDROME CORONAVIRUS 2 (SARS-COV-2) (CORONAVIRUS DISEASE [COVID-19]), AMPLIFIED PROBE TECHNIQUE, MAKING USE OF HIGH THROUGHPUT TECHNOLOGIES AS DESCRIBED BY CMS-2020-01-R: HCPCS | Performed by: INTERNAL MEDICINE

## 2021-08-06 LAB
SARS-COV-2 RNA RESP QL NAA+PROBE: NOT DETECTED
SARS-COV-2- CYCLE NUMBER: -1

## 2021-11-08 RX ORDER — LOSARTAN POTASSIUM AND HYDROCHLOROTHIAZIDE 12.5; 1 MG/1; MG/1
1 TABLET ORAL DAILY
Qty: 90 TABLET | Refills: 0 | Status: SHIPPED | OUTPATIENT
Start: 2021-11-08 | End: 2021-12-15 | Stop reason: SDUPTHER

## 2021-12-07 ENCOUNTER — TELEPHONE (OUTPATIENT)
Dept: FAMILY MEDICINE | Facility: CLINIC | Age: 66
End: 2021-12-07
Payer: MEDICARE

## 2021-12-08 RX ORDER — FINASTERIDE 5 MG/1
5 TABLET, FILM COATED ORAL DAILY
Qty: 90 TABLET | Refills: 3 | Status: SHIPPED | OUTPATIENT
Start: 2021-12-08 | End: 2022-12-06 | Stop reason: SDUPTHER

## 2021-12-14 ENCOUNTER — TELEPHONE (OUTPATIENT)
Dept: SLEEP MEDICINE | Facility: CLINIC | Age: 66
End: 2021-12-14
Payer: MEDICARE

## 2021-12-15 ENCOUNTER — PATIENT MESSAGE (OUTPATIENT)
Dept: INTERNAL MEDICINE | Facility: CLINIC | Age: 66
End: 2021-12-15

## 2021-12-15 ENCOUNTER — OFFICE VISIT (OUTPATIENT)
Dept: INTERNAL MEDICINE | Facility: CLINIC | Age: 66
End: 2021-12-15
Payer: MEDICARE

## 2021-12-15 VITALS
OXYGEN SATURATION: 95 % | SYSTOLIC BLOOD PRESSURE: 132 MMHG | TEMPERATURE: 98 F | BODY MASS INDEX: 35.35 KG/M2 | WEIGHT: 261 LBS | DIASTOLIC BLOOD PRESSURE: 64 MMHG | HEIGHT: 72 IN | HEART RATE: 66 BPM

## 2021-12-15 DIAGNOSIS — E78.5 HYPERLIPIDEMIA, UNSPECIFIED HYPERLIPIDEMIA TYPE: Primary | ICD-10-CM

## 2021-12-15 DIAGNOSIS — E66.9 OBESITY (BMI 35.0-39.9 WITHOUT COMORBIDITY): ICD-10-CM

## 2021-12-15 DIAGNOSIS — E11.9 CONTROLLED TYPE 2 DIABETES MELLITUS WITHOUT COMPLICATION, WITHOUT LONG-TERM CURRENT USE OF INSULIN: ICD-10-CM

## 2021-12-15 DIAGNOSIS — G47.33 OBSTRUCTIVE SLEEP APNEA: ICD-10-CM

## 2021-12-15 DIAGNOSIS — N40.0 BENIGN PROSTATIC HYPERPLASIA, UNSPECIFIED WHETHER LOWER URINARY TRACT SYMPTOMS PRESENT: ICD-10-CM

## 2021-12-15 DIAGNOSIS — I10 ESSENTIAL HYPERTENSION: ICD-10-CM

## 2021-12-15 PROCEDURE — 99999 PR PBB SHADOW E&M-EST. PATIENT-LVL IV: CPT | Mod: PBBFAC,,, | Performed by: INTERNAL MEDICINE

## 2021-12-15 PROCEDURE — 99499 RISK ADDL DX/OHS AUDIT: ICD-10-PCS | Mod: S$GLB,,, | Performed by: INTERNAL MEDICINE

## 2021-12-15 PROCEDURE — 99204 PR OFFICE/OUTPT VISIT, NEW, LEVL IV, 45-59 MIN: ICD-10-PCS | Mod: S$GLB,,, | Performed by: INTERNAL MEDICINE

## 2021-12-15 PROCEDURE — 99204 OFFICE O/P NEW MOD 45 MIN: CPT | Mod: S$GLB,,, | Performed by: INTERNAL MEDICINE

## 2021-12-15 PROCEDURE — 99999 PR PBB SHADOW E&M-EST. PATIENT-LVL IV: ICD-10-PCS | Mod: PBBFAC,,, | Performed by: INTERNAL MEDICINE

## 2021-12-15 PROCEDURE — 99499 UNLISTED E&M SERVICE: CPT | Mod: S$GLB,,, | Performed by: INTERNAL MEDICINE

## 2021-12-15 RX ORDER — LOSARTAN POTASSIUM AND HYDROCHLOROTHIAZIDE 12.5; 1 MG/1; MG/1
1 TABLET ORAL DAILY
Qty: 90 TABLET | Refills: 1 | Status: SHIPPED | OUTPATIENT
Start: 2021-12-15 | End: 2022-03-10

## 2021-12-15 RX ORDER — ROSUVASTATIN CALCIUM 20 MG/1
20 TABLET, COATED ORAL DAILY
Qty: 90 TABLET | Refills: 1
Start: 2021-12-15 | End: 2022-06-16 | Stop reason: SDUPTHER

## 2021-12-15 RX ORDER — METFORMIN HYDROCHLORIDE 1000 MG/1
1000 TABLET ORAL
Qty: 90 TABLET | Refills: 1 | Status: SHIPPED | OUTPATIENT
Start: 2021-12-15 | End: 2022-05-17 | Stop reason: SDUPTHER

## 2021-12-15 RX ORDER — AMLODIPINE BESYLATE 2.5 MG/1
TABLET ORAL
Qty: 90 TABLET | Refills: 1 | Status: SHIPPED | OUTPATIENT
Start: 2021-12-15 | End: 2022-03-10

## 2021-12-16 ENCOUNTER — PATIENT MESSAGE (OUTPATIENT)
Dept: SLEEP MEDICINE | Facility: CLINIC | Age: 66
End: 2021-12-16

## 2021-12-16 ENCOUNTER — OFFICE VISIT (OUTPATIENT)
Dept: SLEEP MEDICINE | Facility: CLINIC | Age: 66
End: 2021-12-16
Payer: MEDICARE

## 2021-12-16 VITALS
BODY MASS INDEX: 35.5 KG/M2 | SYSTOLIC BLOOD PRESSURE: 114 MMHG | WEIGHT: 262.13 LBS | HEART RATE: 61 BPM | DIASTOLIC BLOOD PRESSURE: 79 MMHG | HEIGHT: 72 IN

## 2021-12-16 DIAGNOSIS — I10 ESSENTIAL HYPERTENSION: ICD-10-CM

## 2021-12-16 DIAGNOSIS — G47.33 OBSTRUCTIVE SLEEP APNEA: ICD-10-CM

## 2021-12-16 DIAGNOSIS — E66.9 OBESITY (BMI 35.0-39.9 WITHOUT COMORBIDITY): Primary | ICD-10-CM

## 2021-12-16 PROCEDURE — 99214 PR OFFICE/OUTPT VISIT, EST, LEVL IV, 30-39 MIN: ICD-10-PCS | Mod: S$GLB,,, | Performed by: NURSE PRACTITIONER

## 2021-12-16 PROCEDURE — 99999 PR PBB SHADOW E&M-EST. PATIENT-LVL III: ICD-10-PCS | Mod: PBBFAC,,, | Performed by: NURSE PRACTITIONER

## 2021-12-16 PROCEDURE — 99999 PR PBB SHADOW E&M-EST. PATIENT-LVL III: CPT | Mod: PBBFAC,,, | Performed by: NURSE PRACTITIONER

## 2021-12-16 PROCEDURE — 99214 OFFICE O/P EST MOD 30 MIN: CPT | Mod: S$GLB,,, | Performed by: NURSE PRACTITIONER

## 2022-01-14 ENCOUNTER — PATIENT MESSAGE (OUTPATIENT)
Dept: INTERNAL MEDICINE | Facility: CLINIC | Age: 67
End: 2022-01-14
Payer: MEDICARE

## 2022-01-18 ENCOUNTER — PATIENT MESSAGE (OUTPATIENT)
Dept: INTERNAL MEDICINE | Facility: CLINIC | Age: 67
End: 2022-01-18
Payer: MEDICARE

## 2022-01-18 ENCOUNTER — TELEPHONE (OUTPATIENT)
Dept: FAMILY MEDICINE | Facility: CLINIC | Age: 67
End: 2022-01-18
Payer: MEDICARE

## 2022-01-18 DIAGNOSIS — Z20.822 CLOSE EXPOSURE TO COVID-19 VIRUS: Primary | ICD-10-CM

## 2022-01-18 NOTE — TELEPHONE ENCOUNTER
Pt is going to get PCR testing at the [facility] for work on 1/25 at 8:50. I scheduled the appt we just need the orders.

## 2022-01-18 NOTE — TELEPHONE ENCOUNTER
----- Message from Lindsey Núñez sent at 1/18/2022  4:02 PM CST -----  Needs advice from nurse:      Who Called:pt  Regarding:needs order for PCR test for work   Would the patient rather a call back or [facility]IA [facility]?  Best Call Back number:[phone]  Additional Info:

## 2022-01-25 ENCOUNTER — LAB VISIT (OUTPATIENT)
Dept: FAMILY MEDICINE | Facility: CLINIC | Age: 67
End: 2022-01-25
Payer: MEDICARE

## 2022-01-25 DIAGNOSIS — Z20.822 CLOSE EXPOSURE TO COVID-19 VIRUS: ICD-10-CM

## 2022-01-25 LAB
SARS-COV-2 RNA RESP QL NAA+PROBE: NOT DETECTED
SARS-COV-2- CYCLE NUMBER: NORMAL

## 2022-01-25 PROCEDURE — U0005 INFEC AGEN DETEC AMPLI PROBE: HCPCS | Performed by: INTERNAL MEDICINE

## 2022-01-25 PROCEDURE — U0003 INFECTIOUS AGENT DETECTION BY NUCLEIC ACID (DNA OR RNA); SEVERE ACUTE RESPIRATORY SYNDROME CORONAVIRUS 2 (SARS-COV-2) (CORONAVIRUS DISEASE [COVID-19]), AMPLIFIED PROBE TECHNIQUE, MAKING USE OF HIGH THROUGHPUT TECHNOLOGIES AS DESCRIBED BY CMS-2020-01-R: HCPCS | Performed by: INTERNAL MEDICINE

## 2022-02-09 DIAGNOSIS — Z12.11 COLON CANCER SCREENING: ICD-10-CM

## 2022-03-07 ENCOUNTER — LAB VISIT (OUTPATIENT)
Dept: LAB | Facility: HOSPITAL | Age: 67
End: 2022-03-07
Attending: UROLOGY
Payer: MEDICARE

## 2022-03-07 DIAGNOSIS — N32.81 OAB (OVERACTIVE BLADDER): ICD-10-CM

## 2022-03-07 DIAGNOSIS — N40.0 ENLARGED PROSTATE: ICD-10-CM

## 2022-03-07 LAB — COMPLEXED PSA SERPL-MCNC: 4.6 NG/ML (ref 0–4)

## 2022-03-07 PROCEDURE — 36415 COLL VENOUS BLD VENIPUNCTURE: CPT | Mod: PO | Performed by: UROLOGY

## 2022-03-07 PROCEDURE — 84153 ASSAY OF PSA TOTAL: CPT | Performed by: UROLOGY

## 2022-03-10 ENCOUNTER — OFFICE VISIT (OUTPATIENT)
Dept: UROLOGY | Facility: CLINIC | Age: 67
End: 2022-03-10
Payer: MEDICARE

## 2022-03-10 ENCOUNTER — TELEPHONE (OUTPATIENT)
Dept: UROLOGY | Facility: CLINIC | Age: 67
End: 2022-03-10
Payer: MEDICARE

## 2022-03-10 VITALS
HEART RATE: 72 BPM | BODY MASS INDEX: 35.5 KG/M2 | SYSTOLIC BLOOD PRESSURE: 131 MMHG | WEIGHT: 262.13 LBS | DIASTOLIC BLOOD PRESSURE: 74 MMHG | HEIGHT: 72 IN

## 2022-03-10 DIAGNOSIS — N32.81 OAB (OVERACTIVE BLADDER): ICD-10-CM

## 2022-03-10 DIAGNOSIS — N40.0 ENLARGED PROSTATE: Primary | ICD-10-CM

## 2022-03-10 PROCEDURE — 88112 CYTOPATH CELL ENHANCE TECH: CPT | Mod: 26,,, | Performed by: PATHOLOGY

## 2022-03-10 PROCEDURE — 88112 CYTOPATH CELL ENHANCE TECH: CPT | Performed by: PATHOLOGY

## 2022-03-10 PROCEDURE — 3078F DIAST BP <80 MM HG: CPT | Mod: CPTII,S$GLB,, | Performed by: UROLOGY

## 2022-03-10 PROCEDURE — 88112 PR  CYTOPATH, CELL ENHANCE TECH: ICD-10-PCS | Mod: 26,,, | Performed by: PATHOLOGY

## 2022-03-10 PROCEDURE — 1126F AMNT PAIN NOTED NONE PRSNT: CPT | Mod: CPTII,S$GLB,, | Performed by: UROLOGY

## 2022-03-10 PROCEDURE — 3075F PR MOST RECENT SYSTOLIC BLOOD PRESS GE 130-139MM HG: ICD-10-PCS | Mod: CPTII,S$GLB,, | Performed by: UROLOGY

## 2022-03-10 PROCEDURE — 99214 PR OFFICE/OUTPT VISIT, EST, LEVL IV, 30-39 MIN: ICD-10-PCS | Mod: S$GLB,,, | Performed by: UROLOGY

## 2022-03-10 PROCEDURE — 1101F PR PT FALLS ASSESS DOC 0-1 FALLS W/OUT INJ PAST YR: ICD-10-PCS | Mod: CPTII,S$GLB,, | Performed by: UROLOGY

## 2022-03-10 PROCEDURE — 3008F BODY MASS INDEX DOCD: CPT | Mod: CPTII,S$GLB,, | Performed by: UROLOGY

## 2022-03-10 PROCEDURE — 3078F PR MOST RECENT DIASTOLIC BLOOD PRESSURE < 80 MM HG: ICD-10-PCS | Mod: CPTII,S$GLB,, | Performed by: UROLOGY

## 2022-03-10 PROCEDURE — 99214 OFFICE O/P EST MOD 30 MIN: CPT | Mod: S$GLB,,, | Performed by: UROLOGY

## 2022-03-10 PROCEDURE — 1101F PT FALLS ASSESS-DOCD LE1/YR: CPT | Mod: CPTII,S$GLB,, | Performed by: UROLOGY

## 2022-03-10 PROCEDURE — 3075F SYST BP GE 130 - 139MM HG: CPT | Mod: CPTII,S$GLB,, | Performed by: UROLOGY

## 2022-03-10 PROCEDURE — 3288F FALL RISK ASSESSMENT DOCD: CPT | Mod: CPTII,S$GLB,, | Performed by: UROLOGY

## 2022-03-10 PROCEDURE — 3008F PR BODY MASS INDEX (BMI) DOCUMENTED: ICD-10-PCS | Mod: CPTII,S$GLB,, | Performed by: UROLOGY

## 2022-03-10 PROCEDURE — 1159F MED LIST DOCD IN RCRD: CPT | Mod: CPTII,S$GLB,, | Performed by: UROLOGY

## 2022-03-10 PROCEDURE — 3288F PR FALLS RISK ASSESSMENT DOCUMENTED: ICD-10-PCS | Mod: CPTII,S$GLB,, | Performed by: UROLOGY

## 2022-03-10 PROCEDURE — 1159F PR MEDICATION LIST DOCUMENTED IN MEDICAL RECORD: ICD-10-PCS | Mod: CPTII,S$GLB,, | Performed by: UROLOGY

## 2022-03-10 PROCEDURE — 1126F PR PAIN SEVERITY QUANTIFIED, NO PAIN PRESENT: ICD-10-PCS | Mod: CPTII,S$GLB,, | Performed by: UROLOGY

## 2022-03-10 RX ORDER — AMLODIPINE BESYLATE 5 MG/1
TABLET ORAL
COMMUNITY
Start: 2021-08-31 | End: 2022-06-16 | Stop reason: SDUPTHER

## 2022-03-10 RX ORDER — LOSARTAN POTASSIUM AND HYDROCHLOROTHIAZIDE 12.5; 1 MG/1; MG/1
TABLET ORAL
COMMUNITY
Start: 2021-08-31 | End: 2022-06-08 | Stop reason: SDUPTHER

## 2022-03-10 NOTE — Clinical Note
BMP and MR of prostate then RTC to see Dr Greene  I also send a message to Dr Del Real to see if he can see him for OAB.  Thanks, All.

## 2022-03-10 NOTE — TELEPHONE ENCOUNTER
----- Message from Karl Greene MD sent at 3/10/2022 12:03 PM CST -----  Tono    This is a long time pt with severe OAB.    Would you mind seeing him?  I doubled his myrbetriq and he is doing a voiding diary.    I am also getting an MR of his prostate (he has a nodule that was a hemangioma on some imaging that was done many years ago).  I'm going to see him back after the MR is done.    Thanks    Karl

## 2022-03-10 NOTE — PROGRESS NOTES
"Subjective:      Yuriy Jerome is a 66 y.o. male who returns today regarding his     oab on myrbq  Still with urgency and freq despite myrbetriq  Urgency and freq is mostly at nightl especially if he has alcohol before bedtime    Enlarged prostate on proscar.  Flow is "OK"    AUASS 14    The following portions of the patient's history were reviewed and updated as appropriate: allergies, current medications, past family history, past medical history, past social history, past surgical history and problem list.    Review of Systems  Pertinent items are noted in HPI.  A comprehensive multipoint review of systems was negative except as otherwise stated in the HPI.     Objective:   Vitals: There were no vitals taken for this visit.    Physical Exam   General: alert and oriented, no acute distress  Respiratory: Symmetric expansion, non-labored breathing  Cardiovascular: normal to inspection  Abdomen: non distended   Skin: normal coloration and turgor, no rashes, no suspicious skin lesions noted  Neuro: no gross deficits  Psych: normal judgment and insight, normal mood/affect and non-anxious  CHELLY nodule on right (previous biopsy showed this to be a hemangioma)  Physical Exam    Lab Review   Urinalysis demonstrates negative for all components  Lab Results   Component Value Date    WBC 5.74 01/19/2012    HGB 15.9 01/19/2012    HCT 46.2 01/19/2012    MCV 93.9 01/19/2012     01/19/2012     Lab Results   Component Value Date    CREATININE 1.0 01/19/2012    BUN 15 01/19/2012     Lab Results   Component Value Date    PSA 7.4 (H) 10/09/2009    PSA 3.0 11/11/2005    PSA 1.7 01/09/2004    PSADIAG 4.6 (H) 03/07/2022    PSADIAG 3.8 03/03/2021         Imaging  [id number]    Assessment and Plan:   Enlarged prostate  Cont proscar    OAB (overactive bladder)  Increase myrbetriq to 50mg daily    Avoid pm fluids  Avoid caffeine and alcohol  Timed voiding    Voiding diary    Urine cytology    Refer to Dr Newell consider UDS and " cysto      Abnormal prostate exam (hemangioma on previous TRUS and biopsy at [facility]; records not available)  BMP  MRI prostate  RTC after above

## 2022-03-11 ENCOUNTER — PATIENT MESSAGE (OUTPATIENT)
Dept: UROLOGY | Facility: CLINIC | Age: 67
End: 2022-03-11
Payer: MEDICARE

## 2022-03-11 ENCOUNTER — TELEPHONE (OUTPATIENT)
Dept: UROLOGY | Facility: CLINIC | Age: 67
End: 2022-03-11
Payer: MEDICARE

## 2022-03-11 NOTE — TELEPHONE ENCOUNTER
----- Message from Sandrine Mehta MA sent at 3/11/2022 12:07 PM CST -----  Regarding: pt requesting a call back  Name of Who is Calling: SUJATA MUJICA [[id number]]           What is the request in detail: pt requesting a call back in regards to a Rx that was ordered            Can the clinic reply by [facility]: N           [facility]: [phone]

## 2022-03-11 NOTE — TELEPHONE ENCOUNTER
Patient stated the myrbetriq can not be refilled for him to take a higher dose do to his insurance. Is there a way it can be sent for a 90 day supply.

## 2022-03-14 ENCOUNTER — TELEPHONE (OUTPATIENT)
Dept: UROLOGY | Facility: CLINIC | Age: 67
End: 2022-03-14
Payer: MEDICARE

## 2022-03-14 LAB
FINAL PATHOLOGIC DIAGNOSIS: NORMAL
Lab: NORMAL

## 2022-03-14 NOTE — TELEPHONE ENCOUNTER
----- Message from Layrivera Gan sent at 3/14/2022  3:10 PM CDT -----  Regarding: Requesting a call back  Contact: SUJATA MUJICA [[id number]]  Name of Who is Calling:  SUJATA MUJICA [[id number]]        What is the request in detail: Pt states he is requesting a call back, he states the finasteride (PROSCAR) 5 mg tablet  didn't renew and he only has one pill left. He is requesting a call back Please advise           Can the clinic reply by [facility]: No           What Number to Call Back if not in [facility]:[phone]

## 2022-03-14 NOTE — TELEPHONE ENCOUNTER
I reached out to the [facility] left detail message with the medication , patient number,  and office number.I left  detail message with the information given from the doctor.

## 2022-03-14 NOTE — TELEPHONE ENCOUNTER
I spoke to the patient and he was on the phone with gabe and the store had the wrong insurance name , they also told the patient he had no refills. Per the patient he will call back if he has a problem.

## 2022-03-16 ENCOUNTER — TELEPHONE (OUTPATIENT)
Dept: UROLOGY | Facility: CLINIC | Age: 67
End: 2022-03-16
Payer: MEDICARE

## 2022-03-16 ENCOUNTER — OFFICE VISIT (OUTPATIENT)
Dept: UROLOGY | Facility: CLINIC | Age: 67
End: 2022-03-16
Payer: MEDICARE

## 2022-03-16 VITALS
DIASTOLIC BLOOD PRESSURE: 79 MMHG | SYSTOLIC BLOOD PRESSURE: 135 MMHG | HEIGHT: 72 IN | WEIGHT: 257.94 LBS | BODY MASS INDEX: 34.94 KG/M2 | HEART RATE: 66 BPM

## 2022-03-16 DIAGNOSIS — N32.81 OAB (OVERACTIVE BLADDER): Primary | ICD-10-CM

## 2022-03-16 DIAGNOSIS — N39.41 URGENCY INCONTINENCE: Primary | ICD-10-CM

## 2022-03-16 PROCEDURE — 1159F MED LIST DOCD IN RCRD: CPT | Mod: CPTII,S$GLB,, | Performed by: UROLOGY

## 2022-03-16 PROCEDURE — 1101F PR PT FALLS ASSESS DOC 0-1 FALLS W/OUT INJ PAST YR: ICD-10-PCS | Mod: CPTII,S$GLB,, | Performed by: UROLOGY

## 2022-03-16 PROCEDURE — 1160F PR REVIEW ALL MEDS BY PRESCRIBER/CLIN PHARMACIST DOCUMENTED: ICD-10-PCS | Mod: CPTII,S$GLB,, | Performed by: UROLOGY

## 2022-03-16 PROCEDURE — 3078F DIAST BP <80 MM HG: CPT | Mod: CPTII,S$GLB,, | Performed by: UROLOGY

## 2022-03-16 PROCEDURE — 3078F PR MOST RECENT DIASTOLIC BLOOD PRESSURE < 80 MM HG: ICD-10-PCS | Mod: CPTII,S$GLB,, | Performed by: UROLOGY

## 2022-03-16 PROCEDURE — 3008F PR BODY MASS INDEX (BMI) DOCUMENTED: ICD-10-PCS | Mod: CPTII,S$GLB,, | Performed by: UROLOGY

## 2022-03-16 PROCEDURE — 1160F RVW MEDS BY RX/DR IN RCRD: CPT | Mod: CPTII,S$GLB,, | Performed by: UROLOGY

## 2022-03-16 PROCEDURE — 99214 PR OFFICE/OUTPT VISIT, EST, LEVL IV, 30-39 MIN: ICD-10-PCS | Mod: S$GLB,,, | Performed by: UROLOGY

## 2022-03-16 PROCEDURE — 3288F FALL RISK ASSESSMENT DOCD: CPT | Mod: CPTII,S$GLB,, | Performed by: UROLOGY

## 2022-03-16 PROCEDURE — 99999 PR PBB SHADOW E&M-EST. PATIENT-LVL III: CPT | Mod: PBBFAC,,, | Performed by: UROLOGY

## 2022-03-16 PROCEDURE — 3075F SYST BP GE 130 - 139MM HG: CPT | Mod: CPTII,S$GLB,, | Performed by: UROLOGY

## 2022-03-16 PROCEDURE — 3008F BODY MASS INDEX DOCD: CPT | Mod: CPTII,S$GLB,, | Performed by: UROLOGY

## 2022-03-16 PROCEDURE — 1101F PT FALLS ASSESS-DOCD LE1/YR: CPT | Mod: CPTII,S$GLB,, | Performed by: UROLOGY

## 2022-03-16 PROCEDURE — 3075F PR MOST RECENT SYSTOLIC BLOOD PRESS GE 130-139MM HG: ICD-10-PCS | Mod: CPTII,S$GLB,, | Performed by: UROLOGY

## 2022-03-16 PROCEDURE — 1159F PR MEDICATION LIST DOCUMENTED IN MEDICAL RECORD: ICD-10-PCS | Mod: CPTII,S$GLB,, | Performed by: UROLOGY

## 2022-03-16 PROCEDURE — 99214 OFFICE O/P EST MOD 30 MIN: CPT | Mod: S$GLB,,, | Performed by: UROLOGY

## 2022-03-16 PROCEDURE — 3288F PR FALLS RISK ASSESSMENT DOCUMENTED: ICD-10-PCS | Mod: CPTII,S$GLB,, | Performed by: UROLOGY

## 2022-03-16 PROCEDURE — 99999 PR PBB SHADOW E&M-EST. PATIENT-LVL III: ICD-10-PCS | Mod: PBBFAC,,, | Performed by: UROLOGY

## 2022-03-16 RX ORDER — MIRABEGRON 50 MG/1
TABLET, FILM COATED, EXTENDED RELEASE ORAL
COMMUNITY
End: 2023-03-10

## 2022-03-16 NOTE — PROGRESS NOTES
[facility]      New Overactive Bladder (OAB) Note    3/16/2022    Referred by:  No ref. provider found    HPI: Yuriy Jerome is a very pleasant 66 y.o. male who has not previously been seen by an [facility] specialist in our department referred for evaluation of urinary incontinence of several years duration. He reports bother is associated with urinary daytime frequency (10-12x daily), with nocturia (2-3x per night) and with urgency that results in urinary incontinence monthly. He reports no stress urinary incontinence associated with exertion. He does not require daily pad use.  He has reduced intake of caffeinated beverages.  He reports urinary incontinence is day and night but more predominant during the day.     He denies symptoms of irritative voiding including dysuria. He reports symptoms of obstructive voiding including intermittency. Bladder scan PVR was 0 mL. His history includes no notation of urolithiasis, hematuria, prior pelvic surgery, previous prolapse or incontinence procedures or neurological symptoms/diagnoses. He denies all other prior pelvic surgeries or neurologic diagnoses.     Recent PSA 4.6     Previous incontinence therapies:  Medication:  Myrbetriq 25mg (provided limited benefit)    Finasteride 5 mg  Tamsulosin 0.4 mg (symptoms worsened)    A review of 10+ systems was conducted with pertinent positive and negative findings documented in HPI with all other systems reviewed and negative.    Past medical, family, surgical and social history reviewed as documented in chart with pertinent positive medical, family, surgical and social history detailed in HPI.    Exam Findings:    Const: no acute distress, conversant and alert  Eyes: anicteric, extraocular muscles intact  ENMT: normocephalic, Nl oral membranes  Cardio: no cyanosis, nl cap refill  Pulm: no tachypnea; no resp distress  Musc: no laceration, no tenderness  Neuro: alert; oriented x 3  Skin: warm, dry; no  petichiae  Psych: no anxiety; normal speech     Assessment/Plan:    Overactive Bladder with Urgency Incontinence (new, addt'l workup): His history is suggestive of Overactive Bladder (OAB) with predominantly Urgency Urinary Incontinence (UUI). The presence or absence of incontinence as well as the relative contribution of stress or urgency incontinence can be further clarified with a 3-day volume-based voiding/incontinence diary provided today. This will also help to screen for polyuria and help to guide us on further counseling on behavioral therapy including timed voiding and bladder training. We will review this on his return visit.     His reported symptoms today are relatively moderate and therefore, I believe it would be appropriate to continue pharmacologic therapy in addition to behavioral therapies. We will try increasing his Myrbetriq to 50mg. If no response, plan to proceed with urodynamic evaluation.

## 2022-03-18 ENCOUNTER — PATIENT MESSAGE (OUTPATIENT)
Dept: ADMINISTRATIVE | Facility: HOSPITAL | Age: 67
End: 2022-03-18
Payer: MEDICARE

## 2022-03-20 DIAGNOSIS — Z12.11 SCREENING FOR COLON CANCER: ICD-10-CM

## 2022-04-12 LAB — HEMOCCULT STL QL IA: NEGATIVE

## 2022-04-13 ENCOUNTER — LAB VISIT (OUTPATIENT)
Dept: LAB | Facility: HOSPITAL | Age: 67
End: 2022-04-13
Attending: UROLOGY
Payer: MEDICARE

## 2022-04-13 DIAGNOSIS — N40.0 ENLARGED PROSTATE: ICD-10-CM

## 2022-04-13 LAB
ANION GAP SERPL CALC-SCNC: 9 MMOL/L (ref 8–16)
BUN SERPL-MCNC: 16 MG/DL (ref 8–23)
CALCIUM SERPL-MCNC: 9.9 MG/DL (ref 8.7–10.5)
CHLORIDE SERPL-SCNC: 99 MMOL/L (ref 95–110)
CO2 SERPL-SCNC: 27 MMOL/L (ref 23–29)
CREAT SERPL-MCNC: 1 MG/DL (ref 0.5–1.4)
EST. GFR  (AFRICAN AMERICAN): >60 ML/MIN/1.73 M^2
EST. GFR  (NON AFRICAN AMERICAN): >60 ML/MIN/1.73 M^2
GLUCOSE SERPL-MCNC: 233 MG/DL (ref 70–110)
POTASSIUM SERPL-SCNC: 4.2 MMOL/L (ref 3.5–5.1)
SODIUM SERPL-SCNC: 135 MMOL/L (ref 136–145)

## 2022-04-13 PROCEDURE — 36415 COLL VENOUS BLD VENIPUNCTURE: CPT | Mod: PO | Performed by: UROLOGY

## 2022-04-13 PROCEDURE — 80048 BASIC METABOLIC PNL TOTAL CA: CPT | Performed by: UROLOGY

## 2022-04-18 ENCOUNTER — HOSPITAL ENCOUNTER (OUTPATIENT)
Dept: RADIOLOGY | Facility: HOSPITAL | Age: 67
Discharge: HOME | End: 2022-04-18
Attending: UROLOGY
Payer: MEDICARE

## 2022-04-18 DIAGNOSIS — N40.0 ENLARGED PROSTATE: ICD-10-CM

## 2022-04-18 PROCEDURE — 25500020 PHARM REV CODE 255: Performed by: UROLOGY

## 2022-04-18 PROCEDURE — 72197 MRI PELVIS W/O & W/DYE: CPT | Mod: TC

## 2022-04-18 PROCEDURE — 72197 MRI PROSTATE W W/O CONTRAST: ICD-10-PCS | Mod: 26,,, | Performed by: RADIOLOGY

## 2022-04-18 PROCEDURE — 72197 MRI PELVIS W/O & W/DYE: CPT | Mod: 26,,, | Performed by: RADIOLOGY

## 2022-04-18 PROCEDURE — A9585 GADOBUTROL INJECTION: HCPCS | Performed by: UROLOGY

## 2022-04-18 RX ORDER — GADOBUTROL 604.72 MG/ML
10 INJECTION INTRAVENOUS
Status: COMPLETED | OUTPATIENT
Start: 2022-04-18 | End: 2022-04-18

## 2022-04-18 RX ADMIN — GADOBUTROL 10 ML: 604.72 INJECTION INTRAVENOUS at 03:04

## 2022-04-22 ENCOUNTER — TELEPHONE (OUTPATIENT)
Dept: UROLOGY | Facility: CLINIC | Age: 67
End: 2022-04-22
Payer: MEDICARE

## 2022-04-22 NOTE — TELEPHONE ENCOUNTER
Pt returned the call and cancelled his study on 4-25(9:09am)      Spoke to pt and confirmed 115pm arrival time for FUDS. Pt verbalized understanding.    Left v/m with arrival time and my contact number asking pt to return the call to confirm (9:02am)

## 2022-05-13 ENCOUNTER — ANESTHESIA EVENT (OUTPATIENT)
Dept: SURGERY | Facility: OTHER | Age: 67
End: 2022-05-13
Payer: MEDICARE

## 2022-05-13 ENCOUNTER — HOSPITAL ENCOUNTER (OUTPATIENT)
Dept: PREADMISSION TESTING | Facility: OTHER | Age: 67
Discharge: HOME | End: 2022-05-13
Attending: ORTHOPAEDIC SURGERY
Payer: MEDICARE

## 2022-05-13 VITALS
OXYGEN SATURATION: 94 % | HEART RATE: 72 BPM | BODY MASS INDEX: 33.86 KG/M2 | SYSTOLIC BLOOD PRESSURE: 147 MMHG | WEIGHT: 250 LBS | HEIGHT: 72 IN | DIASTOLIC BLOOD PRESSURE: 80 MMHG

## 2022-05-13 DIAGNOSIS — Z01.818 PREOP TESTING: Primary | ICD-10-CM

## 2022-05-13 PROCEDURE — 93005 ELECTROCARDIOGRAM TRACING: CPT

## 2022-05-13 PROCEDURE — 93010 EKG 12-LEAD: ICD-10-PCS | Mod: ,,, | Performed by: INTERNAL MEDICINE

## 2022-05-13 PROCEDURE — 93010 ELECTROCARDIOGRAM REPORT: CPT | Mod: ,,, | Performed by: INTERNAL MEDICINE

## 2022-05-13 RX ORDER — ACETAMINOPHEN 500 MG
1000 TABLET ORAL
Status: CANCELLED | OUTPATIENT
Start: 2022-05-13 | End: 2022-05-13

## 2022-05-13 RX ORDER — SODIUM CHLORIDE, SODIUM LACTATE, POTASSIUM CHLORIDE, CALCIUM CHLORIDE 600; 310; 30; 20 MG/100ML; MG/100ML; MG/100ML; MG/100ML
INJECTION, SOLUTION INTRAVENOUS CONTINUOUS
Status: CANCELLED | OUTPATIENT
Start: 2022-05-13

## 2022-05-13 RX ORDER — PREGABALIN 50 MG/1
50 CAPSULE ORAL ONCE
Status: CANCELLED | OUTPATIENT
Start: 2022-05-13 | End: 2022-05-13

## 2022-05-13 RX ORDER — ASPIRIN 81 MG/1
81 TABLET ORAL DAILY
COMMUNITY

## 2022-05-13 RX ORDER — LIDOCAINE HYDROCHLORIDE 10 MG/ML
0.5 INJECTION, SOLUTION EPIDURAL; INFILTRATION; INTRACAUDAL; PERINEURAL ONCE
Status: CANCELLED | OUTPATIENT
Start: 2022-05-13 | End: 2022-05-13

## 2022-05-13 RX ORDER — PERPHENAZINE 16 MG
TABLET ORAL
COMMUNITY

## 2022-05-13 NOTE — DISCHARGE INSTRUCTIONS
Information to Prepare you for your Surgery    PRE-ADMIT TESTING -  [phone]    [address] [facility]          Your surgery has been scheduled at [facility]. We are pleased to have the opportunity to serve you. For Further Information please call [phone].    On the day of surgery please report to the Information Desk on the 1st floor.    CONTACT YOUR PHYSICIAN'S OFFICE THE DAY PRIOR TO YOUR SURGERY TO OBTAIN YOUR ARRIVAL TIME.     The evening before surgery do not eat anything after 9 p.m. ( this includes hard candy, chewing gum and mints).  You may only have GATORADE, POWERADE AND WATER  from 9 p.m. until you leave your home.   DO NOT DRINK ANY LIQUIDS ON THE WAY TO THE HOSPITAL.      Why does your anesthesiologist allow you to drink Gatorade/Powerade before surgery?  Gatorade/Powerade helps to increase your comfort before surgery and to decrease your nausea after surgery. The carbohydrates in Gatorade/Powerade help reduce your body's stress response to surgery.  If you are a diabetic-drink only water prior to surgery.      Current Visitor policy(12/27/2021) - Patients may have 2 visitors pre and post procedure. Only 2 visitors will be allowed in the Surgical building with the patient.     SPECIAL MEDICATION INSTRUCTIONS: TAKE medications checked off by the Anesthesiologist on your Medication List.    Angiogram Patients: Take medications as instructed by your physician, including aspirin.     Surgery Patients:    If you take ASPIRIN - Your PHYSICIAN/SURGEON will need to inform you IF/OR when you need to stop taking aspirin prior to your surgery.     Do Not take any medications containing IBUPROFEN.    Do Not Wear any make-up (especially eye make-up) to surgery. Please remove any false eyelashes or eyelash extensions. If you arrive the day of surgery with makeup/eyelashes on you will be required to remove prior to surgery. (There is a risk of corneal  abrasions if eye makeup/eyelash extensions are not removed)      Leave all valuables at home.   Do Not wear any jewelry or watches, including any metal in body piercings. Jewelry must be removed prior to coming to the hospital.  There is a possibility that rings that are unable to be removed may be cut off if they are on the surgical extremity.    Please remove all hair extensions, wigs, clips and any other metal accessories/ ornaments from your hair.  These items may pose a flammable/fire risk in Surgery and must be removed.    Do not shave your surgical area at least 5 days prior to your surgery. The surgical prep will be performed at the hospital according to Infection Control regulations.    Contact Lens must be removed before surgery. Either do not wear the contact lens or bring a case and solution for storage.  Please bring a container for eyeglasses or dentures as required.  Bring any paperwork your physician has provided, such as consent forms,  history and physicals, doctor's orders, etc.   Bring comfortable clothes that are loose fitting to wear upon discharge. Take into consideration the type of surgery being performed.  Maintain your diet as advised per your physician the day prior to surgery.      Adequate rest the night before surgery is advised.   Park in the Parking lot behind the hospital or in the [employer] [facility] across the street from the parking lot. Parking is complimentary.  If you will be discharged the same day as your procedure, please arrange for a responsible adult to drive you home or to accompany you if traveling by taxi.   YOU WILL NOT BE PERMITTED TO DRIVE OR TO LEAVE THE HOSPITAL ALONE AFTER SURGERY.   If you are being discharged the same day, it is strongly recommended that you arrange for someone to remain with you for the first 24 hrs following your surgery.    The Surgeon will speak to your family/visitor after your surgery regarding the outcome of your surgery and post op  care.  The Surgeon may speak to you after your surgery, but there is a possibility you may not remember the details.  Please check with your family members regarding the conversation with the Surgeon.    We strongly recommend whoever is bringing you home be present for discharge instructions.  This will ensure a thorough understanding for your post op home care.    ALL CHILDREN MUST ALWAYS BE ACCOMPANIED BY AN ADULT.    Visitors-Refer to current Visitor policy handouts.    Thank you for your cooperation.  The Staff of [facility].            Bathing Instructions with Hibiclens    Shower the evening before and morning of your procedure with Hibiclens:  Wash your face with water and your regular face wash/soap  Apply Hibiclens directly on your skin or on a wet washcloth and wash gently. When showering: Move away from the shower stream when applying Hibiclens to avoid rinsing off too soon.  Rinse thoroughly with warm water  Do not dilute Hibiclens        Dry off as usual, do not use any deodorant, powder, body lotions, perfume, after shave or cologne.

## 2022-05-13 NOTE — ANESTHESIA PREPROCEDURE EVALUATION
05/13/2022  Yuriy Jerome is a 66 y.o., male.      Pre-op Assessment    I have reviewed the Patient Summary Reports.     I have reviewed the Nursing Notes. I have reviewed the NPO Status.   I have reviewed the Medications.     Review of Systems  Anesthesia Hx:  Denies Family Hx of Anesthesia complications.   Denies Personal Hx of Anesthesia complications.   Social:  Non-Smoker    Hematology/Oncology:  Hematology Normal   Oncology Normal     EENT/Dental:EENT/Dental Normal   Cardiovascular:   Hypertension, well controlled hyperlipidemia    Pulmonary:   Sleep Apnea, CPAP Severe, but improved following UPP   Renal/:   BPH    Hepatic/GI:  Hepatic/GI Normal    Musculoskeletal:  Musculoskeletal Normal    Neurological:  Neurology Normal    Endocrine:   Diabetes, type 2  Obesity / BMI > 30  Dermatological:  Skin Normal    Psych:  Psychiatric Normal           Physical Exam  General: Well nourished, Cooperative, Alert and Oriented    Airway:  Mallampati: I   Mouth Opening: Normal  TM Distance: Normal  Neck ROM: Normal ROM    Dental:  Intact        Anesthesia Plan  Type of Anesthesia, risks & benefits discussed:    Anesthesia Type: Gen ETT  Intra-op Monitoring Plan: Standard ASA Monitors  Post Op Pain Control Plan: multimodal analgesia, IV/PO Opioids PRN and peripheral nerve block  Induction:  IV  Airway Plan: Video  Informed Consent: Informed consent signed with the Patient and all parties understand the risks and agree with anesthesia plan.  All questions answered.   ASA Score: 3  Anesthesia Plan Notes: Lab in epic  EKG NSR    Ready For Surgery From Anesthesia Perspective.     .

## 2022-05-15 NOTE — TELEPHONE ENCOUNTER
Care Due:                  Date            Visit Type   Department     Provider  --------------------------------------------------------------------------------                                NP -                              PRIMARY      [facility] PRIMARY  Last Visit: 12-      CARE ([facility])   ROBERT Jerome                               -                              PRIMARY      [facility] PRIMARY  Next Visit: 08-      CARE ([facility])   ROBERT Jerome                                                            Last  Test          Frequency    Reason                     Performed    Due Date  --------------------------------------------------------------------------------    CMP.........  12 months..  rosuvastatin.............  Not Found    Overdue    HBA1C.......  6 months...  metFORMIN................  Not Found    Overdue    Lipid Panel.  12 months..  rosuvastatin.............  Not Found    Overdue    [employer] Embedded Care Gaps. Reference number: [id number]. 5/15/2022   3:34:52 AM CDT

## 2022-05-16 ENCOUNTER — TELEPHONE (OUTPATIENT)
Dept: UROLOGY | Facility: CLINIC | Age: 67
End: 2022-05-16
Payer: MEDICARE

## 2022-05-16 NOTE — TELEPHONE ENCOUNTER
----- Message from Maliha Saxena LPN sent at 5/16/2022  4:31 PM CDT -----  Regarding: FW: self [phone]  Dr. Greene pt  ----- Message -----  From: Cecy Magaña RN  Sent: 5/9/2022   4:54 PM CDT  To: Maliha Saxena LPN  Subject: FW: self [phone]                              ----- Message -----  From: Ting Haley  Sent: 5/9/2022  12:52 PM CDT  To: [employer] [employer] Staff  Subject: self [phone]                                Type: Patient Call Back    Who called: self    What is the request in detail: Patient would like a call back with his results for his PSA test and MRI.    Can the clinic reply by [facility]? no    Would the patient rather a call back or a response via [facility]?  Call back    Best call back number:[phone]

## 2022-05-16 NOTE — TELEPHONE ENCOUNTER
Refill Routing Note   Medication(s) are not appropriate for processing by [facility] for the following reason(s):      - Required laboratory values are outdated          Medication Therapy Plan: Labs scheduled 8/17/22    Medication reconciliation completed: No     Appointments  past 12m or future 3m with PCP    Date Provider   Last Visit   12/15/2021 Althea Jerome MD   Next Visit   8/22/2022 Althea Jerome MD

## 2022-05-17 ENCOUNTER — PES CALL (OUTPATIENT)
Dept: ADMINISTRATIVE | Facility: CLINIC | Age: 67
End: 2022-05-17
Payer: MEDICARE

## 2022-05-17 RX ORDER — METFORMIN HYDROCHLORIDE 1000 MG/1
TABLET ORAL
Qty: 90 TABLET | Refills: 0 | Status: SHIPPED | OUTPATIENT
Start: 2022-05-17 | End: 2022-06-07

## 2022-05-19 ENCOUNTER — HOSPITAL ENCOUNTER (OUTPATIENT)
Facility: OTHER | Age: 67
Discharge: HOME | End: 2022-05-19
Attending: ORTHOPAEDIC SURGERY | Admitting: ORTHOPAEDIC SURGERY
Payer: MEDICARE

## 2022-05-19 ENCOUNTER — ANESTHESIA (OUTPATIENT)
Dept: SURGERY | Facility: OTHER | Age: 67
End: 2022-05-19
Payer: MEDICARE

## 2022-05-19 VITALS
WEIGHT: 250 LBS | RESPIRATION RATE: 18 BRPM | TEMPERATURE: 98 F | DIASTOLIC BLOOD PRESSURE: 61 MMHG | BODY MASS INDEX: 33.91 KG/M2 | OXYGEN SATURATION: 94 % | SYSTOLIC BLOOD PRESSURE: 121 MMHG | HEART RATE: 74 BPM

## 2022-05-19 DIAGNOSIS — M67.962: Primary | ICD-10-CM

## 2022-05-19 DIAGNOSIS — S93.312A SUBLUXATION OF TARSAL JOINT OF LEFT FOOT, INITIAL ENCOUNTER: ICD-10-CM

## 2022-05-19 LAB
POCT GLUCOSE: 135 MG/DL (ref 70–110)
POCT GLUCOSE: 178 MG/DL (ref 70–110)

## 2022-05-19 PROCEDURE — C1713 ANCHOR/SCREW BN/BN,TIS/BN: HCPCS | Performed by: ORTHOPAEDIC SURGERY

## 2022-05-19 PROCEDURE — 63600175 PHARM REV CODE 636 W HCPCS: Performed by: NURSE ANESTHETIST, CERTIFIED REGISTERED

## 2022-05-19 PROCEDURE — 36000708 HC OR TIME LEV III 1ST 15 MIN: Performed by: ORTHOPAEDIC SURGERY

## 2022-05-19 PROCEDURE — 82962 GLUCOSE BLOOD TEST: CPT | Performed by: ORTHOPAEDIC SURGERY

## 2022-05-19 PROCEDURE — 63600175 PHARM REV CODE 636 W HCPCS: Performed by: ANESTHESIOLOGY

## 2022-05-19 PROCEDURE — 37000009 HC ANESTHESIA EA ADD 15 MINS: Performed by: ORTHOPAEDIC SURGERY

## 2022-05-19 PROCEDURE — 71000039 HC RECOVERY, EACH ADD'L HOUR: Performed by: ORTHOPAEDIC SURGERY

## 2022-05-19 PROCEDURE — 37000008 HC ANESTHESIA 1ST 15 MINUTES: Performed by: ORTHOPAEDIC SURGERY

## 2022-05-19 PROCEDURE — C1769 GUIDE WIRE: HCPCS | Performed by: ORTHOPAEDIC SURGERY

## 2022-05-19 PROCEDURE — 71000015 HC POSTOP RECOV 1ST HR: Performed by: ORTHOPAEDIC SURGERY

## 2022-05-19 PROCEDURE — 36000709 HC OR TIME LEV III EA ADD 15 MIN: Performed by: ORTHOPAEDIC SURGERY

## 2022-05-19 PROCEDURE — 76942 ECHO GUIDE FOR BIOPSY: CPT | Performed by: ANESTHESIOLOGY

## 2022-05-19 PROCEDURE — 71000033 HC RECOVERY, INTIAL HOUR: Performed by: ORTHOPAEDIC SURGERY

## 2022-05-19 PROCEDURE — 71000016 HC POSTOP RECOV ADDL HR: Performed by: ORTHOPAEDIC SURGERY

## 2022-05-19 PROCEDURE — 25000003 PHARM REV CODE 250: Performed by: ANESTHESIOLOGY

## 2022-05-19 PROCEDURE — 63600175 PHARM REV CODE 636 W HCPCS: Performed by: ORTHOPAEDIC SURGERY

## 2022-05-19 PROCEDURE — 25000003 PHARM REV CODE 250: Performed by: NURSE ANESTHETIST, CERTIFIED REGISTERED

## 2022-05-19 DEVICE — 4.5X14MM BC CORKSCREW FT, DX
Type: IMPLANTABLE DEVICE | Site: FOOT | Status: FUNCTIONAL
Brand: ARTHREX®

## 2022-05-19 DEVICE — 4.5X14MM CORKSCREW FT, DISPS
Type: IMPLANTABLE DEVICE | Site: FOOT | Status: FUNCTIONAL
Brand: ARTHREX®

## 2022-05-19 DEVICE — SUPERMX NITI STAPLE W/ INSTRS, 25W X 20L
Type: IMPLANTABLE DEVICE | Site: FOOT | Status: FUNCTIONAL
Brand: ARTHREX®

## 2022-05-19 DEVICE — SUPERMX NITI STAPLE W/ INSTRS, 20W X 20L
Type: IMPLANTABLE DEVICE | Site: FOOT | Status: FUNCTIONAL
Brand: ARTHREX®

## 2022-05-19 RX ORDER — LIDOCAINE HYDROCHLORIDE 20 MG/ML
INJECTION INTRAVENOUS
Status: DISCONTINUED | OUTPATIENT
Start: 2022-05-19 | End: 2022-05-19

## 2022-05-19 RX ORDER — EPHEDRINE SULFATE 50 MG/ML
INJECTION, SOLUTION INTRAVENOUS
Status: DISCONTINUED | OUTPATIENT
Start: 2022-05-19 | End: 2022-05-19

## 2022-05-19 RX ORDER — HYDROMORPHONE HYDROCHLORIDE 2 MG/ML
0.4 INJECTION, SOLUTION INTRAMUSCULAR; INTRAVENOUS; SUBCUTANEOUS EVERY 5 MIN PRN
Status: DISCONTINUED | OUTPATIENT
Start: 2022-05-19 | End: 2022-05-19 | Stop reason: HOSPADM

## 2022-05-19 RX ORDER — DIPHENHYDRAMINE HYDROCHLORIDE 50 MG/ML
12.5 INJECTION INTRAMUSCULAR; INTRAVENOUS EVERY 30 MIN PRN
Status: DISCONTINUED | OUTPATIENT
Start: 2022-05-19 | End: 2022-05-19 | Stop reason: HOSPADM

## 2022-05-19 RX ORDER — FENTANYL CITRATE 50 UG/ML
INJECTION, SOLUTION INTRAMUSCULAR; INTRAVENOUS
Status: DISCONTINUED | OUTPATIENT
Start: 2022-05-19 | End: 2022-05-19

## 2022-05-19 RX ORDER — PROCHLORPERAZINE EDISYLATE 5 MG/ML
5 INJECTION INTRAMUSCULAR; INTRAVENOUS EVERY 30 MIN PRN
Status: DISCONTINUED | OUTPATIENT
Start: 2022-05-19 | End: 2022-05-19 | Stop reason: HOSPADM

## 2022-05-19 RX ORDER — SODIUM CHLORIDE, SODIUM LACTATE, POTASSIUM CHLORIDE, CALCIUM CHLORIDE 600; 310; 30; 20 MG/100ML; MG/100ML; MG/100ML; MG/100ML
INJECTION, SOLUTION INTRAVENOUS CONTINUOUS
Status: DISCONTINUED | OUTPATIENT
Start: 2022-05-19 | End: 2022-05-19 | Stop reason: HOSPADM

## 2022-05-19 RX ORDER — ROPIVACAINE HYDROCHLORIDE 5 MG/ML
INJECTION, SOLUTION EPIDURAL; INFILTRATION; PERINEURAL
Status: COMPLETED | OUTPATIENT
Start: 2022-05-19 | End: 2022-05-19

## 2022-05-19 RX ORDER — PROPOFOL 10 MG/ML
VIAL (ML) INTRAVENOUS
Status: DISCONTINUED | OUTPATIENT
Start: 2022-05-19 | End: 2022-05-19

## 2022-05-19 RX ORDER — ONDANSETRON 2 MG/ML
INJECTION INTRAMUSCULAR; INTRAVENOUS
Status: DISCONTINUED | OUTPATIENT
Start: 2022-05-19 | End: 2022-05-19

## 2022-05-19 RX ORDER — HYDROMORPHONE HYDROCHLORIDE 2 MG/ML
INJECTION, SOLUTION INTRAMUSCULAR; INTRAVENOUS; SUBCUTANEOUS
Status: DISCONTINUED | OUTPATIENT
Start: 2022-05-19 | End: 2022-05-19

## 2022-05-19 RX ORDER — CEFAZOLIN SODIUM 1 G/3ML
2 INJECTION, POWDER, FOR SOLUTION INTRAMUSCULAR; INTRAVENOUS
Status: COMPLETED | OUTPATIENT
Start: 2022-05-19 | End: 2022-05-19

## 2022-05-19 RX ORDER — OXYCODONE AND ACETAMINOPHEN 5; 325 MG/1; MG/1
1-2 TABLET ORAL EVERY 4 HOURS PRN
Qty: 30 TABLET | Refills: 0 | Status: SHIPPED | OUTPATIENT
Start: 2022-05-19 | End: 2022-08-26

## 2022-05-19 RX ORDER — LIDOCAINE HYDROCHLORIDE 10 MG/ML
0.5 INJECTION, SOLUTION EPIDURAL; INFILTRATION; INTRACAUDAL; PERINEURAL ONCE
Status: DISCONTINUED | OUTPATIENT
Start: 2022-05-19 | End: 2022-05-19 | Stop reason: HOSPADM

## 2022-05-19 RX ORDER — ACETAMINOPHEN 500 MG
1000 TABLET ORAL
Status: COMPLETED | OUTPATIENT
Start: 2022-05-19 | End: 2022-05-19

## 2022-05-19 RX ORDER — DEXAMETHASONE SODIUM PHOSPHATE 4 MG/ML
INJECTION, SOLUTION INTRA-ARTICULAR; INTRALESIONAL; INTRAMUSCULAR; INTRAVENOUS; SOFT TISSUE
Status: DISCONTINUED | OUTPATIENT
Start: 2022-05-19 | End: 2022-05-19

## 2022-05-19 RX ORDER — OXYCODONE HYDROCHLORIDE 5 MG/1
5 TABLET ORAL
Status: DISCONTINUED | OUTPATIENT
Start: 2022-05-19 | End: 2022-05-19 | Stop reason: HOSPADM

## 2022-05-19 RX ORDER — SODIUM CHLORIDE 0.9 % (FLUSH) 0.9 %
3 SYRINGE (ML) INJECTION
Status: DISCONTINUED | OUTPATIENT
Start: 2022-05-19 | End: 2022-05-19 | Stop reason: HOSPADM

## 2022-05-19 RX ORDER — PHENYLEPHRINE HYDROCHLORIDE 10 MG/ML
INJECTION INTRAVENOUS
Status: DISCONTINUED | OUTPATIENT
Start: 2022-05-19 | End: 2022-05-19

## 2022-05-19 RX ORDER — MIDAZOLAM HYDROCHLORIDE 1 MG/ML
INJECTION INTRAMUSCULAR; INTRAVENOUS
Status: DISCONTINUED | OUTPATIENT
Start: 2022-05-19 | End: 2022-05-19

## 2022-05-19 RX ORDER — PREGABALIN 50 MG/1
50 CAPSULE ORAL ONCE
Status: COMPLETED | OUTPATIENT
Start: 2022-05-19 | End: 2022-05-19

## 2022-05-19 RX ADMIN — ONDANSETRON HYDROCHLORIDE 4 MG: 2 INJECTION INTRAMUSCULAR; INTRAVENOUS at 09:05

## 2022-05-19 RX ADMIN — MIDAZOLAM HYDROCHLORIDE 2 MG: 1 INJECTION, SOLUTION INTRAMUSCULAR; INTRAVENOUS at 06:05

## 2022-05-19 RX ADMIN — CEFAZOLIN 2 G: 330 INJECTION, POWDER, FOR SOLUTION INTRAMUSCULAR; INTRAVENOUS at 07:05

## 2022-05-19 RX ADMIN — HYDROMORPHONE HYDROCHLORIDE 0.4 MG: 2 INJECTION INTRAMUSCULAR; INTRAVENOUS; SUBCUTANEOUS at 08:05

## 2022-05-19 RX ADMIN — PROPOFOL 40 MG: 10 INJECTION, EMULSION INTRAVENOUS at 09:05

## 2022-05-19 RX ADMIN — PHENYLEPHRINE HYDROCHLORIDE 0.5 MCG/KG/MIN: 10 INJECTION INTRAVENOUS at 09:05

## 2022-05-19 RX ADMIN — LIDOCAINE HYDROCHLORIDE 100 MG: 20 INJECTION, SOLUTION INTRAVENOUS at 07:05

## 2022-05-19 RX ADMIN — EPHEDRINE SULFATE 10 MG: 50 INJECTION INTRAVENOUS at 09:05

## 2022-05-19 RX ADMIN — PHENYLEPHRINE HYDROCHLORIDE 100 MCG: 10 INJECTION INTRAVENOUS at 08:05

## 2022-05-19 RX ADMIN — PROPOFOL 40 MG: 10 INJECTION, EMULSION INTRAVENOUS at 08:05

## 2022-05-19 RX ADMIN — ACETAMINOPHEN 1000 MG: 500 TABLET, FILM COATED ORAL at 06:05

## 2022-05-19 RX ADMIN — HYDROMORPHONE HYDROCHLORIDE 0.4 MG: 2 INJECTION INTRAMUSCULAR; INTRAVENOUS; SUBCUTANEOUS at 09:05

## 2022-05-19 RX ADMIN — EPHEDRINE SULFATE 10 MG: 50 INJECTION INTRAVENOUS at 08:05

## 2022-05-19 RX ADMIN — PREGABALIN 50 MG: 50 CAPSULE ORAL at 06:05

## 2022-05-19 RX ADMIN — OXYCODONE 5 MG: 5 TABLET ORAL at 10:05

## 2022-05-19 RX ADMIN — GLYCOPYRROLATE 0.2 MG: 0.2 INJECTION, SOLUTION INTRAMUSCULAR; INTRAVITREAL at 09:05

## 2022-05-19 RX ADMIN — SODIUM CHLORIDE, SODIUM LACTATE, POTASSIUM CHLORIDE, AND CALCIUM CHLORIDE: 600; 310; 30; 20 INJECTION, SOLUTION INTRAVENOUS at 06:05

## 2022-05-19 RX ADMIN — FENTANYL CITRATE 100 MCG: 50 INJECTION, SOLUTION INTRAMUSCULAR; INTRAVENOUS at 06:05

## 2022-05-19 RX ADMIN — EPHEDRINE SULFATE 10 MG: 50 INJECTION INTRAVENOUS at 10:05

## 2022-05-19 RX ADMIN — PROPOFOL 200 MG: 10 INJECTION, EMULSION INTRAVENOUS at 07:05

## 2022-05-19 RX ADMIN — LIDOCAINE HYDROCHLORIDE 50 MG: 20 INJECTION, SOLUTION INTRAVENOUS at 09:05

## 2022-05-19 RX ADMIN — PHENYLEPHRINE HYDROCHLORIDE 200 MCG: 10 INJECTION INTRAVENOUS at 08:05

## 2022-05-19 RX ADMIN — ROPIVACAINE HYDROCHLORIDE 30 ML: 5 INJECTION, SOLUTION EPIDURAL; INFILTRATION; PERINEURAL at 07:05

## 2022-05-19 RX ADMIN — DEXAMETHASONE SODIUM PHOSPHATE 4 MG: 4 INJECTION, SOLUTION INTRAMUSCULAR; INTRAVENOUS at 09:05

## 2022-05-19 NOTE — ANESTHESIA PROCEDURE NOTES
Peripheral Block    Patient location during procedure: pre-op   Block not for primary anesthetic.  Reason for block: at surgeon's request and post-op pain management   Post-op Pain Location: left foot   Timeout: 5/19/2022 6:57 AM     Staffing  Authorizing Provider: Manish Christopher MD  Performing Provider: Manish Christopher MD    Preanesthetic Checklist  Completed: patient identified, IV checked, site marked, risks and benefits discussed, surgical consent, monitors and equipment checked, pre-op evaluation and timeout performed  Peripheral Block  Patient position: supine  Prep: ChloraPrep and site prepped and draped  Patient monitoring: heart rate, cardiac monitor, continuous pulse ox, continuous capnometry and frequent blood pressure checks  Block type: popliteal  Laterality: left  Injection technique: continuous  Needle  Needle type: Stimuplex   Needle gauge: 21 G  Needle length: 4 in  Needle localization: anatomical landmarks and ultrasound guidance  Test dose: lidocaine 1.5% with Epi 1-to-200,000 and negative   -ultrasound image captured on disc.  Assessment  Injection assessment: negative aspiration, negative parasthesia and local visualized surrounding nerve  Paresthesia pain: none  Heart rate change: no  Slow fractionated injection: yes  Pain Tolerance: comfortable throughout block  Medications:    Medications: ropivacaine (NAROPIN) injection 0.5% - Perineural   30 mL - 5/19/2022 7:00:00 AM    Additional Notes  VSS.  [facility] RN monitoring vitals throughout procedure.  Patient tolerated procedure well.

## 2022-05-19 NOTE — OR NURSING
Vital signs stable on room air. Pain is tolerable per patient. Dressing and PIV clean dry and intact. Meets PACU discharge criteria, ready for transfer to phase II. Patient and spouse updated on plan of care.

## 2022-05-19 NOTE — TRANSFER OF CARE
Anesthesia Transfer of Care Note    Patient: Yuriy Jerome    Procedure(s) Performed: Procedure(s) (LRB):  REPAIR, TENDON, ARTHROSCOPIC, WITH CONVERSION TO OPEN TENDON REPAIR IF INDICATED (Left)  REPAIR, TENDON, EXTENSOR DYSFUNCTION OF POSTERIOR TIBAL TENDON (Left)  FUSION, FOOT (Left)  FUSION, JOINT, MIDFOOT (Left)    Patient location: PACU    Anesthesia Type: general    Transport from OR: Transported from OR on 2-3 L/min O2 by NC with adequate spontaneous ventilation    Post pain: adequate analgesia    Post assessment: no apparent anesthetic complications    Post vital signs: stable    Level of consciousness: awake and alert    Nausea/Vomiting: no nausea/vomiting    Complications: none    Transfer of care protocol was followed      Last vitals:   Visit Vitals  BP (!) 151/92 (BP Location: Left arm, Patient Position: Lying)   Pulse 68   Temp 36.7 °C (98 °F) (Oral)   Resp 16   Wt 113.4 kg (250 lb)   SpO2 97%   BMI 33.91 kg/m²

## 2022-05-19 NOTE — PLAN OF CARE
Yuriy Jerome has met all discharge criteria from Phase II. Vital Signs are stable, ambulating, using rolling walker NWB LLE  without difficulty. Discharge instructions given, patient verbalized understanding. Discharged from facility via wheelchair in stable condition.

## 2022-05-19 NOTE — OP NOTE
[facility], [place] [facility] ([place])  Operative Note     SUMMARY     Surgery Date: 5/19/2022     Surgeon(s) and Role:     * Coy PRINCE II(Field) MD Poly - Primary    Assisting Surgeon: None    Pre-op Diagnosis:  Disorder of synovium of left knee [M67.962]  Unspecified disorder of synovium and tendon, left lower leg [M67.962]  Subluxation of tarsal joint of left foot, initial encounter [S93.312A]    Post-op Diagnosis:  Post-Op Diagnosis Codes:     * Disorder of synovium of left knee [M67.962]     * Unspecified disorder of synovium and tendon, left lower leg [M67.962]     * Subluxation of tarsal joint of left foot, initial encounter [S93.312A]    Procedure(s) (LRB):  REPAIR, TENDON, ARTHROSCOPIC, WITH CONVERSION TO OPEN TENDON REPAIR IF INDICATED (Left)  REPAIR, TENDON, EXTENSOR DYSFUNCTION OF POSTERIOR TIBAL TENDON (Left)  FUSION, FOOT (Left)  FUSION, JOINT, MIDFOOT (Left)     Procedure:  1. Left talonavicular arthrodesis  2. Left subtalar arthrodesis  3. Left posterior tibial tendon repair  4. Left transfer of the posterior tibial tendon to the talus  5. Transfer of the left flexor digitorum longus tendon to the navicular  6. Intraoperative fluoroscopy    Anesthesia: General    Indication for Procedure:  66-year-old gentleman with a longstanding history of painful foot deformity treated with an arthroereisis screw and an outlying facility with subsequent infection and hardware removal.  After consideration of the risks benefits alternatives and extensive.  Between the harder removal and further surgical intervention, elected to proceed with the above procedures.    Procedure in Detail: After appropriate consents were obtained, the patient was taken to the operating room and placed under general and is tracheal anesthesia. The left lower extremity was prepped and draped in sterile fashion. A tourniquet was placed around the left thigh and inflated to 300 mm Hg after exsanguination with an Esmarch bandage. A longitudinal  incision was made medially just distal to the medial malleolus and along the medial column of the foot. Sharp dissection was carried down through the skin and subcutaneous tissues down to the level of the interval between the tib post into the tendons. The interval was explored proximally and distally and provided access to the talonavicular and naviculocuneiform joints. Access to the talonavicular joint was then obtained and debridment of the cartilage surfaces was performed. Subchondral bone was penetrated both on the talar head and on the navicular and good removal of the cartilage was noted. Lateral incision was then made just distal to the fibula in line with the fourth metatarsal and sharp dissection was carried through the skin and subcutaneous tissues. Extensor digitorum brevis muscle belly was then removed from its origin and reflected distally using a 0 Vicryl suture. This provided access to the subtalar joint and using a Hinterman distractor, it was were distracted. The joint surfaces were then debrided of all cartilage surfaces for prior and extensive debridement of both the subtalar and calcaneocuboid joints. Combination of ronguer osteotome and curettes was used to remove all the cartilage surfaces and get down to subchondral bone. Following complete cartilage removal, the subchondral bone was penetrated using multiple drill bits and osteotomes to feather the surfaces as well as to provide good bleeding subchondral bone. Following adequate preparation of all of the joints, the foot was reduced, and tendo Achilles contracture was still noted. At this point, a three cut posterior tendo Achilles lengthening was performed with good release noted. This was allowed for about 10-15 degrees of dorsiflexion following the tendon Achilles release.  The subtalar joint was then secured using 2 6.5 mm headless screw advanced through the tuberosity of the calcaneus and into the talar neck and secured, measuring about  90 mm in length. The talonavicular joint was then secured using 2 Arthrex compression staples placed proximally 80° from 1 another over the talonavicular joint.  Dissection was then carried inferiorly along the posterior tibial tendon sheath and the diseased portion of the posterior tibial tendon was debrided and excised.  The distal 3 cm of the posterior tibial tendon was truly beyond repair and completely incompetent and unable to be tensioned back to the navicular.  As such a corkscrew anchor was placed into the medial head of the talar head and the posterior tibial tendon was repaired in a side-to-side fashion after complete circumferential debridement.  Tendon was then transferred into the talar head at the anchor site.  The FDL tendon was then exposed and transferred to the navicular footprint of the posterior tibial tendon for further inversion support.  Good alignment of the hindfoot and midfoot was confirmed.  Good placement of the hardware was noted under fluoroscopic visualization in the AP and lateral planes of the foot as well as axial view of the heel, and AP view of the ankle. Wounds were then copiously irrigated with normal saline and closed in layers with 0 Vicryl bringing the EDB muscle belly back into place and 0 Vicryl. The deep tissues and 2-0 Vicryl intermediate tissues, 3-0 Monocryl and subcutaneous tissues followed by skin staples in all wounds. Sterile dressings were applied, followed by a posterior splint with stirrups. The patient was awakened and taken to recovery room in good condition. He tolerated the procedure well. All counts were correct. There were no complications.    Estimated Blood Loss: * No values recorded between 5/19/2022  8:00 AM and 5/19/2022 10:06 AM *         Specimens:   Specimen (24h ago, onward)            None          Discharge Note    SUMMARY     Admit Date: 5/19/2022    Discharge Date and Time: No discharge date for patient encounter.    Hospital Course (synopsis  of major diagnoses, care, treatment, and services provided during the course of the hospital stay):  Outpatient surgery     Final Diagnosis: Post-Op Diagnosis Codes:     * Disorder of synovium of left knee [M67.962]     * Unspecified disorder of synovium and tendon, left lower leg [M67.962]     * Subluxation of tarsal joint of left foot, initial encounter [S93.312A]    Disposition: Home or Self Care    Follow Up/Patient Instructions:     Medications:  Reconciled Home Medications:      Medication List      START taking these medications    oxyCODONE-acetaminophen 5-325 mg per tablet  Commonly known as: PERCOCET  Take 1-2 tablets by mouth every 4 (four) hours as needed for Pain.        CONTINUE taking these medications    alpha lipoic acid 600 mg Cap  Take by mouth.     amLODIPine 5 MG tablet  Commonly known as: NORVASC  1 tablet     aspirin 81 MG EC tablet  Commonly known as: ECOTRIN  Take 81 mg by mouth once daily.     budesonide 1 mg/2 mL Nbsp  U 2 ML VIA NEB D     coQ10 (ubiquinol) 200 mg Cap     finasteride 5 mg tablet  Commonly known as: PROSCAR  Take 1 tablet (5 mg total) by mouth once daily.     fish oil-omega-3 fatty acids 300-1,000 mg capsule  Take by mouth once daily.     fluticasone propionate 50 mcg/actuation nasal spray  Commonly known as: FLONASE  fluticasone propionate 50 mcg/actuation nasal spray,suspension     losartan-hydrochlorothiazide 100-12.5 mg 100-12.5 mg Tab  Commonly known as: HYZAAR  1 tablet     metFORMIN 1000 MG tablet  Commonly known as: GLUCOPHAGE  TAKE 1 TABLET(1000 MG) BY MOUTH DAILY WITH BREAKFAST     MULTIVITAMIN 50 PLUS ORAL  Take by mouth.     MYRBETRIQ 50 mg Tb24  Generic drug: mirabegron  Take by mouth.     rosuvastatin 20 MG tablet  Commonly known as: CRESTOR  Take 1 tablet (20 mg total) by mouth once daily.     traZODone 50 MG tablet  Commonly known as: DESYREL  TAKE 1 TO 2 TABLETS(50  MG) BY MOUTH EVERY EVENING     UNABLE TO FIND  osteobiflex          Discharge  Procedure Orders   CRUTCHES FOR HOME USE     Order Specific Question Answer Comments   Type: Axillary    Height: 6    Weight: 113.4 kg (250 lb)    Length of need (1-99 months): 99      Diet general     Leave dressing on - Keep it clean, dry, and intact until clinic visit     Call MD for:  temperature >100.4     Call MD for:  persistent nausea and vomiting     Call MD for:  severe uncontrolled pain     Call MD for:  difficulty breathing, headache or visual disturbances     Call MD for:  redness, tenderness, or signs of infection (pain, swelling, redness, odor or green/yellow discharge around incision site)     Call MD for:  hives     Call MD for:  persistent dizziness or light-headedness     Non weight bearing     @FOLLOWUPOHS

## 2022-05-19 NOTE — ANESTHESIA POSTPROCEDURE EVALUATION
Anesthesia Post Evaluation    Patient: Yuriy Jerome    Procedure(s) Performed: Procedure(s) (LRB):  Left talonavicular arthrodesis, Left subtalar arthrodesis, Left posterior tibial tendon repair, Left transfer of the posterior tibial tendon to the talus, Transfer of the left flexor digitorum longus tendon to the navicular, Intraoperative fluoroscopy  (Left)    Final Anesthesia Type: general      Patient location during evaluation: PACU  Patient participation: Yes- Able to Participate  Level of consciousness: awake and alert  Post-procedure vital signs: reviewed and stable  Pain management: adequate  Airway patency: patent    PONV status at discharge: No PONV  Anesthetic complications: no      Cardiovascular status: blood pressure returned to baseline and stable  Respiratory status: room air  Hydration status: euvolemic  Follow-up not needed.          Vitals Value Taken Time   /74 05/19/22 1105   Temp 36.5 °C (97.7 °F) 05/19/22 1016   Pulse 81 05/19/22 1107   Resp 16 05/19/22 1035   SpO2 96 % 05/19/22 1107   Vitals shown include unvalidated device data.      Event Time   Out of Recovery 11:07:29         Pain/Marcela Score: Pain Rating Prior to Med Admin: 5 (5/19/2022 10:26 AM)  Pain Rating Post Med Admin: 3 (tolerable per patient) (5/19/2022 11:00 AM)  Marcela Score: 9 (5/19/2022 11:00 AM)

## 2022-05-31 ENCOUNTER — PATIENT MESSAGE (OUTPATIENT)
Dept: UROLOGY | Facility: CLINIC | Age: 67
End: 2022-05-31
Payer: MEDICARE

## 2022-06-07 ENCOUNTER — PATIENT MESSAGE (OUTPATIENT)
Dept: INTERNAL MEDICINE | Facility: CLINIC | Age: 67
End: 2022-06-07
Payer: MEDICARE

## 2022-06-07 RX ORDER — METFORMIN HYDROCHLORIDE 1000 MG/1
TABLET ORAL
Qty: 90 TABLET | Refills: 0 | Status: SHIPPED | OUTPATIENT
Start: 2022-06-07 | End: 2022-12-23 | Stop reason: SDUPTHER

## 2022-06-07 NOTE — TELEPHONE ENCOUNTER
Refill Routing Note   Medication(s) are not appropriate for processing by [facility] for the following reason(s):      - Required laboratory values are outdated  - Patient has been seen in the ED/Hospital since the last PCP visit    [facility] action(s):  Defer          Medication reconciliation completed: No     Appointments  past 12m or future 3m with PCP    Date Provider   Last Visit   12/15/2021 Althea Jerome MD   Next Visit   8/22/2022 Althea Jerome MD   ED visits in past 90 days: 0        Note composed:12:24 PM 06/07/2022

## 2022-06-07 NOTE — TELEPHONE ENCOUNTER
No new care gaps identified.  [facility] Embedded Care Gaps. Reference number: [id number]. 6/07/2022   5:46:34 AM RAMONAT

## 2022-06-08 ENCOUNTER — PATIENT MESSAGE (OUTPATIENT)
Dept: INTERNAL MEDICINE | Facility: CLINIC | Age: 67
End: 2022-06-08
Payer: MEDICARE

## 2022-06-08 NOTE — TELEPHONE ENCOUNTER
No new care gaps identified.  [facility] Embedded Care Gaps. Reference number: [id number]. 6/08/2022   1:38:52 PM CDT

## 2022-06-09 RX ORDER — LOSARTAN POTASSIUM AND HYDROCHLOROTHIAZIDE 12.5; 1 MG/1; MG/1
1 TABLET ORAL DAILY
Qty: 90 TABLET | Refills: 1 | Status: SHIPPED | OUTPATIENT
Start: 2022-06-09 | End: 2023-02-16 | Stop reason: SDUPTHER

## 2022-06-16 ENCOUNTER — PATIENT MESSAGE (OUTPATIENT)
Dept: INTERNAL MEDICINE | Facility: CLINIC | Age: 67
End: 2022-06-16
Payer: MEDICARE

## 2022-06-16 RX ORDER — AMLODIPINE BESYLATE 5 MG/1
5 TABLET ORAL DAILY
Qty: 30 TABLET | Refills: 5 | Status: SHIPPED | OUTPATIENT
Start: 2022-06-16 | End: 2022-12-04 | Stop reason: SDUPTHER

## 2022-06-16 RX ORDER — ROSUVASTATIN CALCIUM 20 MG/1
20 TABLET, COATED ORAL DAILY
Qty: 90 TABLET | Refills: 1 | Status: SHIPPED | OUTPATIENT
Start: 2022-06-16 | End: 2022-06-17 | Stop reason: SDUPTHER

## 2022-06-16 NOTE — TELEPHONE ENCOUNTER
No new care gaps identified.  [facility] Embedded Care Gaps. Reference number: [id number]. 6/16/2022   3:13:42 PM CDT

## 2022-06-17 RX ORDER — ROSUVASTATIN CALCIUM 20 MG/1
20 TABLET, COATED ORAL DAILY
Qty: 90 TABLET | Refills: 1 | Status: SHIPPED | OUTPATIENT
Start: 2022-06-17 | End: 2023-06-12 | Stop reason: SDUPTHER

## 2022-07-12 ENCOUNTER — PATIENT MESSAGE (OUTPATIENT)
Dept: INTERNAL MEDICINE | Facility: CLINIC | Age: 67
End: 2022-07-12
Payer: MEDICARE

## 2022-07-13 ENCOUNTER — PATIENT MESSAGE (OUTPATIENT)
Dept: INTERNAL MEDICINE | Facility: CLINIC | Age: 67
End: 2022-07-13
Payer: MEDICARE

## 2022-07-27 ENCOUNTER — PATIENT OUTREACH (OUTPATIENT)
Dept: ADMINISTRATIVE | Facility: HOSPITAL | Age: 67
End: 2022-07-27
Payer: MEDICARE

## 2022-07-28 ENCOUNTER — PES CALL (OUTPATIENT)
Dept: ADMINISTRATIVE | Facility: CLINIC | Age: 67
End: 2022-07-28
Payer: MEDICARE

## 2022-08-11 ENCOUNTER — PATIENT OUTREACH (OUTPATIENT)
Dept: ADMINISTRATIVE | Facility: HOSPITAL | Age: 67
End: 2022-08-11
Payer: MEDICARE

## 2022-08-17 ENCOUNTER — LAB VISIT (OUTPATIENT)
Dept: LAB | Facility: HOSPITAL | Age: 67
End: 2022-08-17
Attending: INTERNAL MEDICINE
Payer: MEDICARE

## 2022-08-17 DIAGNOSIS — E78.5 HYPERLIPIDEMIA, UNSPECIFIED HYPERLIPIDEMIA TYPE: ICD-10-CM

## 2022-08-17 DIAGNOSIS — E11.9 CONTROLLED TYPE 2 DIABETES MELLITUS WITHOUT COMPLICATION, WITHOUT LONG-TERM CURRENT USE OF INSULIN: ICD-10-CM

## 2022-08-17 LAB
CHOLEST SERPL-MCNC: 133 MG/DL (ref 120–199)
CHOLEST/HDLC SERPL: 3.2 {RATIO} (ref 2–5)
ESTIMATED AVG GLUCOSE: 131 MG/DL (ref 68–131)
HBA1C MFR BLD: 6.2 % (ref 4–5.6)
HDLC SERPL-MCNC: 41 MG/DL (ref 40–75)
HDLC SERPL: 30.8 % (ref 20–50)
LDLC SERPL CALC-MCNC: 49.6 MG/DL (ref 63–159)
NONHDLC SERPL-MCNC: 92 MG/DL
TRIGL SERPL-MCNC: 212 MG/DL (ref 30–150)

## 2022-08-17 PROCEDURE — 80061 LIPID PANEL: CPT | Performed by: INTERNAL MEDICINE

## 2022-08-17 PROCEDURE — 36415 COLL VENOUS BLD VENIPUNCTURE: CPT | Mod: PO | Performed by: INTERNAL MEDICINE

## 2022-08-17 PROCEDURE — 83036 HEMOGLOBIN GLYCOSYLATED A1C: CPT | Performed by: INTERNAL MEDICINE

## 2022-08-26 ENCOUNTER — OFFICE VISIT (OUTPATIENT)
Dept: INTERNAL MEDICINE | Facility: CLINIC | Age: 67
End: 2022-08-26
Payer: MEDICARE

## 2022-08-26 VITALS
SYSTOLIC BLOOD PRESSURE: 138 MMHG | DIASTOLIC BLOOD PRESSURE: 76 MMHG | HEIGHT: 72 IN | TEMPERATURE: 99 F | OXYGEN SATURATION: 95 % | WEIGHT: 253.5 LBS | HEART RATE: 79 BPM | BODY MASS INDEX: 34.34 KG/M2

## 2022-08-26 DIAGNOSIS — E11.9 CONTROLLED TYPE 2 DIABETES MELLITUS WITHOUT COMPLICATION, WITHOUT LONG-TERM CURRENT USE OF INSULIN: Primary | ICD-10-CM

## 2022-08-26 DIAGNOSIS — S93.312S: ICD-10-CM

## 2022-08-26 DIAGNOSIS — Z11.59 ENCOUNTER FOR HEPATITIS C SCREENING TEST FOR LOW RISK PATIENT: ICD-10-CM

## 2022-08-26 DIAGNOSIS — I10 ESSENTIAL HYPERTENSION: ICD-10-CM

## 2022-08-26 PROCEDURE — 3288F PR FALLS RISK ASSESSMENT DOCUMENTED: ICD-10-PCS | Mod: CPTII,S$GLB,, | Performed by: INTERNAL MEDICINE

## 2022-08-26 PROCEDURE — 99214 PR OFFICE/OUTPT VISIT, EST, LEVL IV, 30-39 MIN: ICD-10-PCS | Mod: S$GLB,,, | Performed by: INTERNAL MEDICINE

## 2022-08-26 PROCEDURE — 3066F NEPHROPATHY DOC TX: CPT | Mod: CPTII,S$GLB,, | Performed by: INTERNAL MEDICINE

## 2022-08-26 PROCEDURE — 1125F PR PAIN SEVERITY QUANTIFIED, PAIN PRESENT: ICD-10-PCS | Mod: CPTII,S$GLB,, | Performed by: INTERNAL MEDICINE

## 2022-08-26 PROCEDURE — 3078F PR MOST RECENT DIASTOLIC BLOOD PRESSURE < 80 MM HG: ICD-10-PCS | Mod: CPTII,S$GLB,, | Performed by: INTERNAL MEDICINE

## 2022-08-26 PROCEDURE — 1101F PR PT FALLS ASSESS DOC 0-1 FALLS W/OUT INJ PAST YR: ICD-10-PCS | Mod: CPTII,S$GLB,, | Performed by: INTERNAL MEDICINE

## 2022-08-26 PROCEDURE — 1160F PR REVIEW ALL MEDS BY PRESCRIBER/CLIN PHARMACIST DOCUMENTED: ICD-10-PCS | Mod: CPTII,S$GLB,, | Performed by: INTERNAL MEDICINE

## 2022-08-26 PROCEDURE — 1160F RVW MEDS BY RX/DR IN RCRD: CPT | Mod: CPTII,S$GLB,, | Performed by: INTERNAL MEDICINE

## 2022-08-26 PROCEDURE — 3288F FALL RISK ASSESSMENT DOCD: CPT | Mod: CPTII,S$GLB,, | Performed by: INTERNAL MEDICINE

## 2022-08-26 PROCEDURE — 1101F PT FALLS ASSESS-DOCD LE1/YR: CPT | Mod: CPTII,S$GLB,, | Performed by: INTERNAL MEDICINE

## 2022-08-26 PROCEDURE — 99999 PR PBB SHADOW E&M-EST. PATIENT-LVL IV: ICD-10-PCS | Mod: PBBFAC,,, | Performed by: INTERNAL MEDICINE

## 2022-08-26 PROCEDURE — 1159F PR MEDICATION LIST DOCUMENTED IN MEDICAL RECORD: ICD-10-PCS | Mod: CPTII,S$GLB,, | Performed by: INTERNAL MEDICINE

## 2022-08-26 PROCEDURE — 3061F NEG MICROALBUMINURIA REV: CPT | Mod: CPTII,S$GLB,, | Performed by: INTERNAL MEDICINE

## 2022-08-26 PROCEDURE — 3078F DIAST BP <80 MM HG: CPT | Mod: CPTII,S$GLB,, | Performed by: INTERNAL MEDICINE

## 2022-08-26 PROCEDURE — 99499 UNLISTED E&M SERVICE: CPT | Mod: S$GLB,,, | Performed by: INTERNAL MEDICINE

## 2022-08-26 PROCEDURE — 99999 PR PBB SHADOW E&M-EST. PATIENT-LVL IV: CPT | Mod: PBBFAC,,, | Performed by: INTERNAL MEDICINE

## 2022-08-26 PROCEDURE — 1125F AMNT PAIN NOTED PAIN PRSNT: CPT | Mod: CPTII,S$GLB,, | Performed by: INTERNAL MEDICINE

## 2022-08-26 PROCEDURE — 3066F PR DOCUMENTATION OF TREATMENT FOR NEPHROPATHY: ICD-10-PCS | Mod: CPTII,S$GLB,, | Performed by: INTERNAL MEDICINE

## 2022-08-26 PROCEDURE — 3008F BODY MASS INDEX DOCD: CPT | Mod: CPTII,S$GLB,, | Performed by: INTERNAL MEDICINE

## 2022-08-26 PROCEDURE — 99499 RISK ADDL DX/OHS AUDIT: ICD-10-PCS | Mod: S$GLB,,, | Performed by: INTERNAL MEDICINE

## 2022-08-26 PROCEDURE — 3075F SYST BP GE 130 - 139MM HG: CPT | Mod: CPTII,S$GLB,, | Performed by: INTERNAL MEDICINE

## 2022-08-26 PROCEDURE — 3044F HG A1C LEVEL LT 7.0%: CPT | Mod: CPTII,S$GLB,, | Performed by: INTERNAL MEDICINE

## 2022-08-26 PROCEDURE — 99214 OFFICE O/P EST MOD 30 MIN: CPT | Mod: S$GLB,,, | Performed by: INTERNAL MEDICINE

## 2022-08-26 PROCEDURE — 3075F PR MOST RECENT SYSTOLIC BLOOD PRESS GE 130-139MM HG: ICD-10-PCS | Mod: CPTII,S$GLB,, | Performed by: INTERNAL MEDICINE

## 2022-08-26 PROCEDURE — 1159F MED LIST DOCD IN RCRD: CPT | Mod: CPTII,S$GLB,, | Performed by: INTERNAL MEDICINE

## 2022-08-26 PROCEDURE — 3008F PR BODY MASS INDEX (BMI) DOCUMENTED: ICD-10-PCS | Mod: CPTII,S$GLB,, | Performed by: INTERNAL MEDICINE

## 2022-08-26 PROCEDURE — 3044F PR MOST RECENT HEMOGLOBIN A1C LEVEL <7.0%: ICD-10-PCS | Mod: CPTII,S$GLB,, | Performed by: INTERNAL MEDICINE

## 2022-08-26 PROCEDURE — 3061F PR NEG MICROALBUMINURIA RESULT DOCUMENTED/REVIEW: ICD-10-PCS | Mod: CPTII,S$GLB,, | Performed by: INTERNAL MEDICINE

## 2022-08-26 NOTE — PROGRESS NOTES
[facility] Note    Chief Complaint      Chief Complaint   Patient presents with    Follow-up     6 mth     History of Present Illness      Yuriy Jerome is a 66 y.o. male who presents today for chief complaint follow up chronic  issues     HPI   recenlty had foot surgery with Dr Pang is in walking boot is doing pt, op  notes in epic, is in PT 2x a week   Labs last week  A1c 6.2    BPH sees Dr Greene takes proscar and myrbetriq which helps with nocturia, PSA 4.6 this year   NP Samano in sleep med uses autopap   HTN on norvasc and losaratn hctz is at goal today, has home cuff, is at goal today   Metformin: predm per pt on last labs and started on metformin -- LAST A1c was 7.1->6.2  HLD on crestor 20 was recently incerased by Viktor from 10, ->49     No hx of asthma, was having wheezing while using rcalled cpap now resolved, allergy shots with Dr Balderas   S/p bialteral THR with Oscar  Had foot surgery had pes planus repair which resulted in osteo Alexis Hawley - sees foot surgeon dr Pang     PSA: 3.21  Colonoscopy: q5 years Mark Hoang get records 10.2020  Flu: utd 11.2021  Shingles: not had   PNA: x2 utd   Tobacco: never   Other jose balderas ENT, Jc, sleep med,   Active Problem List with Overview Notes    Diagnosis Date Noted    Subluxation of tarsal joint of left foot 05/19/2022    Hyperlipidemia 12/15/2021    Essential hypertension 12/15/2021    Benign prostatic hyperplasia 12/15/2021    Obesity (BMI 35.0-39.9 without comorbidity) 12/15/2021    Controlled type 2 diabetes mellitus without complication, without long-term current use of insulin 12/15/2021    Insomnia 08/24/2018    Obstructive sleep apnea 05/26/2016     Health Maintenance   Topic Date Due    Hepatitis C Screening  Never done    Foot Exam  Never done    Eye Exam  Never done    TETANUS VACCINE  Never done    Hemoglobin A1c  02/17/2023    PROSTATE-SPECIFIC ANTIGEN  03/07/2023    Lipid Panel  08/17/2023    Low Dose  Statin  08/26/2023       Past Medical History:   Diagnosis Date    Allergy     Diabetes mellitus     prediabetic    Sleep apnea     Staphylococcal infection     left ankle, 2017       Past Surgical History:   Procedure Laterality Date    ADENOIDECTOMY  2001    UPPP surgery    APPENDECTOMY  2005    BICEPS TENDON REPAIR Left 12/28/2021    EYE SURGERY  2017    Cararact surgery    FOOT SURGERY Left 05/2017    subtalar implant, 6 weeks later removed due to failure    FOOT SURGERY Left 05/19/2022    HERNIA REPAIR      DOUBLE age 8    JOINT REPLACEMENT  2009    Bilateral knee replacement    KNEE SURGERY Bilateral 05/2011    TKR, had ligament replacement in right , meniscus repair both knees    LAMINECTOMY  1989    spinal    SPINE SURGERY  1991    Triple laminectomy    TONSILLECTOMY  2001    UPPP surgery    UVULOPALATOPHARYNGOPLASTY  2007    VASECTOMY  1985       family history includes Arthritis in his mother; Asthma in his father; Cancer in his maternal grandfather and maternal grandmother; Drug abuse in his brother, brother, brother, and brother; Heart disease in his father.     Social History     Tobacco Use    Smoking status: Never    Smokeless tobacco: Never   Substance Use Topics    Alcohol use: Yes     Alcohol/week: 13.0 standard drinks     Types: 7 Glasses of wine, 4 Cans of beer, 2 Shots of liquor per week    Drug use: No       Review of Systems   Constitutional:  Negative for chills, fever, malaise/fatigue and weight loss.   Respiratory:  Negative for cough, sputum production, shortness of breath and wheezing.    Cardiovascular:  Negative for chest pain, palpitations, orthopnea and leg swelling.   Gastrointestinal:  Negative for constipation, diarrhea, nausea and vomiting.   Genitourinary:  Negative for dysuria, frequency, hematuria and urgency.      Outpatient Encounter Medications as of 8/26/2022   Medication Sig Dispense Refill    alpha lipoic acid 600 mg Cap Take by mouth.      amLODIPine (NORVASC) 5 MG  tablet Take 1 tablet (5 mg total) by mouth once daily. 30 tablet 5    aspirin (ECOTRIN) 81 MG EC tablet Take 81 mg by mouth once daily.      coQ10, ubiquinol, 200 mg Cap       finasteride (PROSCAR) 5 mg tablet Take 1 tablet (5 mg total) by mouth once daily. 90 tablet 3    fluticasone propionate (FLONASE) 50 mcg/actuation nasal spray fluticasone propionate 50 mcg/actuation nasal spray,suspension      losartan-hydrochlorothiazide 100-12.5 mg (HYZAAR) 100-12.5 mg Tab Take 1 tablet by mouth once daily. 90 tablet 1    metFORMIN (GLUCOPHAGE) 1000 MG tablet TAKE 1 TABLET(1000 MG) BY MOUTH DAILY WITH BREAKFAST 90 tablet 0    mirabegron (MYRBETRIQ) 50 mg Tb24 Take by mouth.      multivit with minerals/lutein (MULTIVITAMIN 50 PLUS ORAL) Take by mouth.      omega-3 fatty acids/fish oil (FISH OIL-OMEGA-3 FATTY ACIDS) 300-1,000 mg capsule Take by mouth once daily.      rosuvastatin (CRESTOR) 20 MG tablet Take 1 tablet (20 mg total) by mouth once daily. 90 tablet 1    UNABLE TO FIND osteobiflex      [DISCONTINUED] budesonide 1 mg/2 mL NbSp U 2 ML VIA NEB D      [DISCONTINUED] oxyCODONE-acetaminophen (PERCOCET) 5-325 mg per tablet Take 1-2 tablets by mouth every 4 (four) hours as needed for Pain. 30 tablet 0    [DISCONTINUED] traZODone (DESYREL) 50 MG tablet TAKE 1 TO 2 TABLETS(50  MG) BY MOUTH EVERY EVENING 60 tablet 5     No facility-administered encounter medications on file as of 8/26/2022.       Review of patient's allergies indicates:  No Known Allergies      Physical Exam      Vital Signs  Temp: 98.7 °F (37.1 °C)  Temp src: Oral  Pulse: 79  SpO2: 95 %  BP: 138/76  BP Location: Right arm  Patient Position: Sitting  Pain Score:   6  Height and Weight  Height: 6' (182.9 cm)  Weight: 115 kg (253 lb 8.5 oz) (left boot)  BSA (Calculated - sq m): 2.42 sq meters  BMI (Calculated): 34.4  Weight in (lb) to have BMI = 25: 183.9]    Physical Exam  Vitals reviewed.   Constitutional:       Appearance: He is well-developed. He is  not diaphoretic.   HENT:      Head: Normocephalic and atraumatic.      Right Ear: External ear normal.      Left Ear: External ear normal.   Eyes:      General: No scleral icterus.        Right eye: No discharge.         Left eye: No discharge.      Conjunctiva/sclera: Conjunctivae normal.   Pulmonary:      Effort: Pulmonary effort is normal. No respiratory distress.   Musculoskeletal:         General: Normal range of motion.      Cervical back: Normal range of motion.   Neurological:      Mental Status: He is alert and oriented to person, place, and time.   Psychiatric:         Behavior: Behavior normal.         Thought Content: Thought content normal.         Judgment: Judgment normal.        Laboratory:  CBC:  No results for input(s): WBC, RBC, HGB, HCT, PLT, MCV, MCH, MCHC in the last 2160 hours.  CMP:  No results for input(s): GLU, CALCIUM, ALBUMIN, PROT, NA, K, CO2, CL, BUN, ALKPHOS, ALT, AST, BILITOT in the last 2160 hours.    Invalid input(s): CREATININ  URINALYSIS:  No results for input(s): COLORU, CLARITYU, SPECGRAV, PHUR, PROTEINUA, GLUCOSEU, BILIRUBINCON, BLOODU, WBCU, RBCU, BACTERIA, MUCUS, NITRITE, LEUKOCYTESUR, UROBILINOGEN, HYALINECASTS in the last 2160 hours.   LIPIDS:  Recent Labs   Lab Result Units 08/17/22  0843   HDL mg/dL 41   Cholesterol mg/dL 133   Triglycerides mg/dL 212*   LDL Cholesterol mg/dL 49.6*   HDL/Cholesterol Ratio % 30.8   Non-HDL Cholesterol mg/dL 92   Total Cholesterol/HDL Ratio  3.2     TSH:  No results for input(s): TSH in the last 2160 hours.  A1C:  Recent Labs   Lab Result Units 08/17/22  0843   Hemoglobin A1C % 6.2*       Radiology:      Assessment/Plan     Yuriy Jerome is a 66 y.o.male with:    1. Controlled type 2 diabetes mellitus without complication, without long-term current use of insulin  Assessment & Plan:  stable    Orders:  -     Hemoglobin A1C    2. Encounter for hepatitis C screening test for low risk patient  -     Hepatitis C Antibody    3. Essential  hypertension  Assessment & Plan:  At goal       4. Subluxation of tarsal joint of left foot, sequela  Assessment & Plan:  Per ortho         Use of the [facility] Patient Portal discussed and encouraged during today's visit  -Continue current medications and maintain follow up with specialists.  Return to clinic in 6 mos annual.  Future Appointments   Date Time Provider Department Center   2/27/2023 11:00 AM [facility], [facility] [place]   3/6/2023 11:00 AM Althea Jerome MD [facility]       Althea Jerome MD  8/31/2022 3:09 PM    Primary Care Internal Medicine

## 2022-10-13 ENCOUNTER — PES CALL (OUTPATIENT)
Dept: ADMINISTRATIVE | Facility: CLINIC | Age: 67
End: 2022-10-13
Payer: MEDICARE

## 2022-10-18 ENCOUNTER — PATIENT MESSAGE (OUTPATIENT)
Dept: ADMINISTRATIVE | Facility: HOSPITAL | Age: 67
End: 2022-10-18
Payer: MEDICARE

## 2022-10-18 ENCOUNTER — PATIENT OUTREACH (OUTPATIENT)
Dept: ADMINISTRATIVE | Facility: HOSPITAL | Age: 67
End: 2022-10-18
Payer: MEDICARE

## 2022-11-15 DIAGNOSIS — N40.0 ENLARGED PROSTATE: Primary | ICD-10-CM

## 2022-12-06 ENCOUNTER — PATIENT MESSAGE (OUTPATIENT)
Dept: UROLOGY | Facility: CLINIC | Age: 67
End: 2022-12-06
Payer: MEDICARE

## 2022-12-06 DIAGNOSIS — N40.0 ENLARGED PROSTATE: Primary | ICD-10-CM

## 2022-12-16 ENCOUNTER — OFFICE VISIT (OUTPATIENT)
Dept: SLEEP MEDICINE | Facility: CLINIC | Age: 67
End: 2022-12-16
Payer: MEDICARE

## 2022-12-16 VITALS
BODY MASS INDEX: 33.86 KG/M2 | HEIGHT: 72 IN | DIASTOLIC BLOOD PRESSURE: 72 MMHG | HEART RATE: 69 BPM | WEIGHT: 250 LBS | SYSTOLIC BLOOD PRESSURE: 138 MMHG

## 2022-12-16 DIAGNOSIS — G47.33 OBSTRUCTIVE SLEEP APNEA: ICD-10-CM

## 2022-12-16 DIAGNOSIS — I10 ESSENTIAL HYPERTENSION: Primary | ICD-10-CM

## 2022-12-16 PROCEDURE — 3044F PR MOST RECENT HEMOGLOBIN A1C LEVEL <7.0%: ICD-10-PCS | Mod: CPTII,S$GLB,, | Performed by: NURSE PRACTITIONER

## 2022-12-16 PROCEDURE — 3288F PR FALLS RISK ASSESSMENT DOCUMENTED: ICD-10-PCS | Mod: CPTII,S$GLB,, | Performed by: NURSE PRACTITIONER

## 2022-12-16 PROCEDURE — 99999 PR PBB SHADOW E&M-EST. PATIENT-LVL III: CPT | Mod: PBBFAC,,, | Performed by: NURSE PRACTITIONER

## 2022-12-16 PROCEDURE — 1126F PR PAIN SEVERITY QUANTIFIED, NO PAIN PRESENT: ICD-10-PCS | Mod: CPTII,S$GLB,, | Performed by: NURSE PRACTITIONER

## 2022-12-16 PROCEDURE — 1159F MED LIST DOCD IN RCRD: CPT | Mod: CPTII,S$GLB,, | Performed by: NURSE PRACTITIONER

## 2022-12-16 PROCEDURE — 3075F PR MOST RECENT SYSTOLIC BLOOD PRESS GE 130-139MM HG: ICD-10-PCS | Mod: CPTII,S$GLB,, | Performed by: NURSE PRACTITIONER

## 2022-12-16 PROCEDURE — 3008F BODY MASS INDEX DOCD: CPT | Mod: CPTII,S$GLB,, | Performed by: NURSE PRACTITIONER

## 2022-12-16 PROCEDURE — 3066F NEPHROPATHY DOC TX: CPT | Mod: CPTII,S$GLB,, | Performed by: NURSE PRACTITIONER

## 2022-12-16 PROCEDURE — 3044F HG A1C LEVEL LT 7.0%: CPT | Mod: CPTII,S$GLB,, | Performed by: NURSE PRACTITIONER

## 2022-12-16 PROCEDURE — 99214 PR OFFICE/OUTPT VISIT, EST, LEVL IV, 30-39 MIN: ICD-10-PCS | Mod: S$GLB,,, | Performed by: NURSE PRACTITIONER

## 2022-12-16 PROCEDURE — 1101F PR PT FALLS ASSESS DOC 0-1 FALLS W/OUT INJ PAST YR: ICD-10-PCS | Mod: CPTII,S$GLB,, | Performed by: NURSE PRACTITIONER

## 2022-12-16 PROCEDURE — 1101F PT FALLS ASSESS-DOCD LE1/YR: CPT | Mod: CPTII,S$GLB,, | Performed by: NURSE PRACTITIONER

## 2022-12-16 PROCEDURE — 3061F NEG MICROALBUMINURIA REV: CPT | Mod: CPTII,S$GLB,, | Performed by: NURSE PRACTITIONER

## 2022-12-16 PROCEDURE — 99214 OFFICE O/P EST MOD 30 MIN: CPT | Mod: S$GLB,,, | Performed by: NURSE PRACTITIONER

## 2022-12-16 PROCEDURE — 3288F FALL RISK ASSESSMENT DOCD: CPT | Mod: CPTII,S$GLB,, | Performed by: NURSE PRACTITIONER

## 2022-12-16 PROCEDURE — 3008F PR BODY MASS INDEX (BMI) DOCUMENTED: ICD-10-PCS | Mod: CPTII,S$GLB,, | Performed by: NURSE PRACTITIONER

## 2022-12-16 PROCEDURE — 3066F PR DOCUMENTATION OF TREATMENT FOR NEPHROPATHY: ICD-10-PCS | Mod: CPTII,S$GLB,, | Performed by: NURSE PRACTITIONER

## 2022-12-16 PROCEDURE — 3061F PR NEG MICROALBUMINURIA RESULT DOCUMENTED/REVIEW: ICD-10-PCS | Mod: CPTII,S$GLB,, | Performed by: NURSE PRACTITIONER

## 2022-12-16 PROCEDURE — 1126F AMNT PAIN NOTED NONE PRSNT: CPT | Mod: CPTII,S$GLB,, | Performed by: NURSE PRACTITIONER

## 2022-12-16 PROCEDURE — 99999 PR PBB SHADOW E&M-EST. PATIENT-LVL III: ICD-10-PCS | Mod: PBBFAC,,, | Performed by: NURSE PRACTITIONER

## 2022-12-16 PROCEDURE — 3078F DIAST BP <80 MM HG: CPT | Mod: CPTII,S$GLB,, | Performed by: NURSE PRACTITIONER

## 2022-12-16 PROCEDURE — 3078F PR MOST RECENT DIASTOLIC BLOOD PRESSURE < 80 MM HG: ICD-10-PCS | Mod: CPTII,S$GLB,, | Performed by: NURSE PRACTITIONER

## 2022-12-16 PROCEDURE — 1159F PR MEDICATION LIST DOCUMENTED IN MEDICAL RECORD: ICD-10-PCS | Mod: CPTII,S$GLB,, | Performed by: NURSE PRACTITIONER

## 2022-12-16 PROCEDURE — 3075F SYST BP GE 130 - 139MM HG: CPT | Mod: CPTII,S$GLB,, | Performed by: NURSE PRACTITIONER

## 2022-12-16 NOTE — PROGRESS NOTES
This 67 y.o. male returns today for management of obstructive sleep apnea. Got refurbished DS1 machine , still had outdated respironics model at home. Using qhs, can't sleep w/o it.  Using heated hose. Sleep no longer consolidated. Nocturia 3-4/seeing uro. Bouts of sleep onset difficulties. Brain can't find off switch. Continued improvement of symptoms overall.      trazadone prn caused brain fog next day  BP stable     Interrogation DS1: AHI 1.9, 30d avg 7.4h/ 90% tile 12.4cm. 69% mask fit.     BASELINE SLEEP STUDY 2004 RDI 76.8; O2 lizett 86%, titrated to 12cm. Wt 224.6 lbs     Hx  UPPP, septoplasty, sinus surgery    PHYSICAL EXAM:   /72   Pulse 69   Ht 6' (1.829 m)   Wt 113.4 kg (250 lb)   BMI 33.91 kg/m²       IMPRESSION:   1. LACHO.  Continued adherence, AHI<5, benefits from therapy. Dependent on therapy, defers inspire    2. Insomnia     PLAN:  1.Continue Auto CPAP 12-16, rTHS DME  2 Discussed effectiveness of therapy  3. See pcp re HTN mgt/continue med

## 2023-02-15 ENCOUNTER — PATIENT MESSAGE (OUTPATIENT)
Dept: PRIMARY CARE CLINIC | Facility: CLINIC | Age: 68
End: 2023-02-15
Payer: MEDICARE

## 2023-02-15 DIAGNOSIS — E11.9 TYPE 2 DIABETES MELLITUS WITHOUT COMPLICATION, UNSPECIFIED WHETHER LONG TERM INSULIN USE: ICD-10-CM

## 2023-02-16 RX ORDER — LOSARTAN POTASSIUM AND HYDROCHLOROTHIAZIDE 12.5; 1 MG/1; MG/1
1 TABLET ORAL DAILY
Qty: 90 TABLET | Refills: 1 | Status: SHIPPED | OUTPATIENT
Start: 2023-02-16 | End: 2023-08-17

## 2023-02-20 ENCOUNTER — PATIENT MESSAGE (OUTPATIENT)
Dept: ADMINISTRATIVE | Facility: HOSPITAL | Age: 68
End: 2023-02-20
Payer: MEDICARE

## 2023-02-27 ENCOUNTER — LAB VISIT (OUTPATIENT)
Dept: LAB | Facility: HOSPITAL | Age: 68
End: 2023-02-27
Attending: INTERNAL MEDICINE
Payer: MEDICARE

## 2023-02-27 DIAGNOSIS — N40.0 ENLARGED PROSTATE: ICD-10-CM

## 2023-02-27 DIAGNOSIS — Z11.59 ENCOUNTER FOR HEPATITIS C SCREENING TEST FOR LOW RISK PATIENT: ICD-10-CM

## 2023-02-27 DIAGNOSIS — E11.9 CONTROLLED TYPE 2 DIABETES MELLITUS WITHOUT COMPLICATION, WITHOUT LONG-TERM CURRENT USE OF INSULIN: ICD-10-CM

## 2023-02-27 LAB
COMPLEXED PSA SERPL-MCNC: 4.1 NG/ML (ref 0–4)
ESTIMATED AVG GLUCOSE: 148 MG/DL (ref 68–131)
HBA1C MFR BLD: 6.8 % (ref 4–5.6)
HCV AB SERPL QL IA: NORMAL

## 2023-02-27 PROCEDURE — 36415 COLL VENOUS BLD VENIPUNCTURE: CPT | Mod: PO | Performed by: INTERNAL MEDICINE

## 2023-02-27 PROCEDURE — 86803 HEPATITIS C AB TEST: CPT | Performed by: INTERNAL MEDICINE

## 2023-02-27 PROCEDURE — 84153 ASSAY OF PSA TOTAL: CPT | Performed by: UROLOGY

## 2023-02-27 PROCEDURE — 83036 HEMOGLOBIN GLYCOSYLATED A1C: CPT | Performed by: INTERNAL MEDICINE

## 2023-03-03 ENCOUNTER — PATIENT OUTREACH (OUTPATIENT)
Dept: ADMINISTRATIVE | Facility: HOSPITAL | Age: 68
End: 2023-03-03
Payer: MEDICARE

## 2023-03-03 NOTE — LETTER
AUTHORIZATION FOR RELEASE OF   CONFIDENTIAL INFORMATION    Dear Dr. Drew c/o [facility],    We are seeing Yuriy Jerome, date of birth 1955, in the clinic at [facility]. Althea Jerome MD is the patient's PCP. Yuriy Jerome has an outstanding lab/procedure at the time we reviewed his chart. In order to help keep his health information updated, he has authorized us to request the following medical record(s):        (  )  MAMMOGRAM                                      (  )  COLONOSCOPY      (  )  PAP SMEAR                                          (  )  OUTSIDE LAB RESULTS     (  )  DEXA SCAN                                          (XX)  DIABETIC EYE EXAM            (  )  FOOT EXAM                                          (  )  ENTIRE RECORD     (  )  OUTSIDE IMMUNIZATIONS                 (  )  _______________         Please fax records to Althea Jerome MD, [phone].      If you have any questions, please contact Dionne Mehta LPN at [phone].           Patient Name: Yuriy Jerome  : 1955  Patient Phone #: [phone]

## 2023-03-03 NOTE — PROGRESS NOTES
Health Maintenance Due   Topic Date Due    Foot Exam  Never done    Eye Exam  Never done    TETANUS VACCINE  Never done    Shingles Vaccine (1 of 2) Never done     Chart review done. HM updated. Immunizations reviewed & updated. Care Everywhere updated.  THAI sent to [facility] for DM Eye Exam from Dr. Drew.      Problem: Adult Inpatient Plan of Care  Goal: Plan of Care Review  Outcome: Ongoing (interventions implemented as appropriate)  Pt received remainder of post hydration with no complications. Pt instructed to call MD with any problems. NAD. Pt discharged home independently with friend at side.

## 2023-03-06 ENCOUNTER — PATIENT MESSAGE (OUTPATIENT)
Dept: PRIMARY CARE CLINIC | Facility: CLINIC | Age: 68
End: 2023-03-06

## 2023-03-06 ENCOUNTER — OFFICE VISIT (OUTPATIENT)
Dept: PRIMARY CARE CLINIC | Facility: CLINIC | Age: 68
End: 2023-03-06
Payer: MEDICARE

## 2023-03-06 ENCOUNTER — PATIENT OUTREACH (OUTPATIENT)
Dept: ADMINISTRATIVE | Facility: HOSPITAL | Age: 68
End: 2023-03-06
Payer: MEDICARE

## 2023-03-06 VITALS
BODY MASS INDEX: 34.58 KG/M2 | DIASTOLIC BLOOD PRESSURE: 78 MMHG | HEIGHT: 72 IN | OXYGEN SATURATION: 96 % | WEIGHT: 255.31 LBS | HEART RATE: 63 BPM | SYSTOLIC BLOOD PRESSURE: 138 MMHG

## 2023-03-06 DIAGNOSIS — I10 ESSENTIAL HYPERTENSION: ICD-10-CM

## 2023-03-06 DIAGNOSIS — E11.9 CONTROLLED TYPE 2 DIABETES MELLITUS WITHOUT COMPLICATION, WITHOUT LONG-TERM CURRENT USE OF INSULIN: Primary | ICD-10-CM

## 2023-03-06 DIAGNOSIS — Z00.00 WELLNESS EXAMINATION: Primary | ICD-10-CM

## 2023-03-06 DIAGNOSIS — E11.9 CONTROLLED TYPE 2 DIABETES MELLITUS WITHOUT COMPLICATION, WITHOUT LONG-TERM CURRENT USE OF INSULIN: ICD-10-CM

## 2023-03-06 DIAGNOSIS — E78.5 HYPERLIPIDEMIA, UNSPECIFIED HYPERLIPIDEMIA TYPE: ICD-10-CM

## 2023-03-06 DIAGNOSIS — N40.0 BENIGN PROSTATIC HYPERPLASIA, UNSPECIFIED WHETHER LOWER URINARY TRACT SYMPTOMS PRESENT: ICD-10-CM

## 2023-03-06 PROCEDURE — 3044F PR MOST RECENT HEMOGLOBIN A1C LEVEL <7.0%: ICD-10-PCS | Mod: CPTII,S$GLB,, | Performed by: INTERNAL MEDICINE

## 2023-03-06 PROCEDURE — 1159F PR MEDICATION LIST DOCUMENTED IN MEDICAL RECORD: ICD-10-PCS | Mod: CPTII,S$GLB,, | Performed by: INTERNAL MEDICINE

## 2023-03-06 PROCEDURE — 3008F BODY MASS INDEX DOCD: CPT | Mod: CPTII,S$GLB,, | Performed by: INTERNAL MEDICINE

## 2023-03-06 PROCEDURE — 99397 PER PM REEVAL EST PAT 65+ YR: CPT | Mod: S$GLB,,, | Performed by: INTERNAL MEDICINE

## 2023-03-06 PROCEDURE — 99999 PR PBB SHADOW E&M-EST. PATIENT-LVL III: ICD-10-PCS | Mod: PBBFAC,,, | Performed by: INTERNAL MEDICINE

## 2023-03-06 PROCEDURE — 3044F HG A1C LEVEL LT 7.0%: CPT | Mod: CPTII,S$GLB,, | Performed by: INTERNAL MEDICINE

## 2023-03-06 PROCEDURE — 99999 PR PBB SHADOW E&M-EST. PATIENT-LVL III: CPT | Mod: PBBFAC,,, | Performed by: INTERNAL MEDICINE

## 2023-03-06 PROCEDURE — 3078F DIAST BP <80 MM HG: CPT | Mod: CPTII,S$GLB,, | Performed by: INTERNAL MEDICINE

## 2023-03-06 PROCEDURE — 3075F SYST BP GE 130 - 139MM HG: CPT | Mod: CPTII,S$GLB,, | Performed by: INTERNAL MEDICINE

## 2023-03-06 PROCEDURE — 3078F PR MOST RECENT DIASTOLIC BLOOD PRESSURE < 80 MM HG: ICD-10-PCS | Mod: CPTII,S$GLB,, | Performed by: INTERNAL MEDICINE

## 2023-03-06 PROCEDURE — 1159F MED LIST DOCD IN RCRD: CPT | Mod: CPTII,S$GLB,, | Performed by: INTERNAL MEDICINE

## 2023-03-06 PROCEDURE — 1160F PR REVIEW ALL MEDS BY PRESCRIBER/CLIN PHARMACIST DOCUMENTED: ICD-10-PCS | Mod: CPTII,S$GLB,, | Performed by: INTERNAL MEDICINE

## 2023-03-06 PROCEDURE — 1160F RVW MEDS BY RX/DR IN RCRD: CPT | Mod: CPTII,S$GLB,, | Performed by: INTERNAL MEDICINE

## 2023-03-06 PROCEDURE — 3075F PR MOST RECENT SYSTOLIC BLOOD PRESS GE 130-139MM HG: ICD-10-PCS | Mod: CPTII,S$GLB,, | Performed by: INTERNAL MEDICINE

## 2023-03-06 PROCEDURE — 3008F PR BODY MASS INDEX (BMI) DOCUMENTED: ICD-10-PCS | Mod: CPTII,S$GLB,, | Performed by: INTERNAL MEDICINE

## 2023-03-06 PROCEDURE — 99397 PR PREVENTIVE VISIT,EST,65 & OVER: ICD-10-PCS | Mod: S$GLB,,, | Performed by: INTERNAL MEDICINE

## 2023-03-06 RX ORDER — METFORMIN HYDROCHLORIDE 1000 MG/1
1000 TABLET ORAL DAILY
Qty: 90 TABLET | Refills: 1 | Status: SHIPPED | OUTPATIENT
Start: 2023-03-06 | End: 2023-09-11 | Stop reason: SDUPTHER

## 2023-03-06 NOTE — PROGRESS NOTES
[facility] Note    Chief Complaint      Chief Complaint   Patient presents with    Annual Exam     History of Present Illness      Yuriy Jerome is a 67 y.o. male who presents today for chief complaint annual wellness .  HPI  Annual fills well no complaints   Going to [place] 3 [place]   He had labs last week PSA 4.2 with urology, A1c up at 6.8, lipids last august and BMP last April     BPH sees Dr Greene takes proscar and myrbetriq which helps with nocturia, PSA as above, has fillow up net week   NP Samano in sleep med uses autopap   HTN on norvasc and losaratn hctz is at goal today, has home cuff, is at goal today   Metformin: predm per, a1c as above he is on metformin   HLD on crestor 20 was recently incerased by Viktor from 10, ->49     No hx of asthma, was having wheezing while using rcalled cpap now resolved, allergy shots with Dr Balderas   S/p bialteral THR with Oscar  Had foot surgery had pes planus with dr Pang     PSA: utd   Colonoscopy: q5 years Mark Hoang get records 10.2020  Flu: utd 11.2021  Shingles: not had   PNA: x2 utd   Tobacco: never   Other jose balderas ENT, Jc, sleep med,   Active Problem List with Overview Notes    Diagnosis Date Noted    Subluxation of tarsal joint of left foot 05/19/2022    Hyperlipidemia 12/15/2021    Essential hypertension 12/15/2021    Benign prostatic hyperplasia 12/15/2021    Obesity (BMI 35.0-39.9 without comorbidity) 12/15/2021    Controlled type 2 diabetes mellitus without complication, without long-term current use of insulin 12/15/2021    Insomnia 08/24/2018    Obstructive sleep apnea 05/26/2016     Health Maintenance   Topic Date Due    Foot Exam  Never done    TETANUS VACCINE  Never done    Lipid Panel  08/17/2023    Hemoglobin A1c  08/27/2023    Eye Exam  01/27/2024    PROSTATE-SPECIFIC ANTIGEN  02/27/2024    Low Dose Statin  03/06/2024    Hepatitis C Screening  Completed       Past Medical History:   Diagnosis Date     Allergy     Diabetes mellitus     prediabetic    Sleep apnea     Staphylococcal infection     left ankle, 2017       Past Surgical History:   Procedure Laterality Date    ADENOIDECTOMY  2001    UPPP surgery    APPENDECTOMY  2005    BICEPS TENDON REPAIR Left 12/28/2021    EYE SURGERY  2017    Cararact surgery    FOOT SURGERY Left 05/2017    subtalar implant, 6 weeks later removed due to failure    FOOT SURGERY Left 05/19/2022    HERNIA REPAIR      DOUBLE age 8    JOINT REPLACEMENT  2009    Bilateral knee replacement    KNEE SURGERY Bilateral 05/2011    TKR, had ligament replacement in right , meniscus repair both knees    LAMINECTOMY  1989    spinal    SPINE SURGERY  1991    Triple laminectomy    TONSILLECTOMY  2001    UPPP surgery    UVULOPALATOPHARYNGOPLASTY  2007    VASECTOMY  1985       family history includes Arthritis in his mother; Asthma in his father; Cancer in his maternal grandfather and maternal grandmother; Drug abuse in his brother, brother, brother, and brother; Heart disease in his father.     Social History     Tobacco Use    Smoking status: Never    Smokeless tobacco: Never   Substance Use Topics    Alcohol use: Yes     Alcohol/week: 11.0 standard drinks     Types: 7 Glasses of wine, 4 Cans of beer per week    Drug use: No       Review of Systems   Constitutional:  Negative for chills, fever, malaise/fatigue and weight loss.   Respiratory:  Negative for cough, sputum production, shortness of breath and wheezing.    Cardiovascular:  Negative for chest pain, palpitations, orthopnea and leg swelling.   Gastrointestinal:  Negative for constipation, diarrhea, nausea and vomiting.   Genitourinary:  Negative for dysuria, frequency, hematuria and urgency.      Outpatient Encounter Medications as of 3/6/2023   Medication Sig Dispense Refill    alpha lipoic acid 600 mg Cap Take by mouth.      amLODIPine (NORVASC) 5 MG tablet Take 1 tablet (5 mg total) by mouth once daily. 90 tablet 1    aspirin (ECOTRIN) 81  MG EC tablet Take 81 mg by mouth once daily.      coQ10, ubiquinol, 200 mg Cap       finasteride (PROSCAR) 5 mg tablet Take 1 tablet (5 mg total) by mouth once daily. 90 tablet 3    fluticasone propionate (FLONASE) 50 mcg/actuation nasal spray fluticasone propionate 50 mcg/actuation nasal spray,suspension      losartan-hydrochlorothiazide 100-12.5 mg (HYZAAR) 100-12.5 mg Tab Take 1 tablet by mouth once daily. 90 tablet 1    mirabegron (MYRBETRIQ) 50 mg Tb24 Take by mouth.      multivit with minerals/lutein (MULTIVITAMIN 50 PLUS ORAL) Take by mouth.      omega-3 fatty acids/fish oil (FISH OIL-OMEGA-3 FATTY ACIDS) 300-1,000 mg capsule Take by mouth once daily.      rosuvastatin (CRESTOR) 20 MG tablet Take 1 tablet (20 mg total) by mouth once daily. 90 tablet 1    UNABLE TO FIND osteobiflex      [DISCONTINUED] metFORMIN (GLUCOPHAGE) 1000 MG tablet Take 1 tablet (1,000 mg total) by mouth 2 (two) times daily with meals. (Patient taking differently: Take 1,000 mg by mouth Daily.) 180 tablet 1    metFORMIN (GLUCOPHAGE) 1000 MG tablet Take 1 tablet (1,000 mg total) by mouth Daily. 90 tablet 1    [DISCONTINUED] losartan-hydrochlorothiazide 100-12.5 mg (HYZAAR) 100-12.5 mg Tab Take 1 tablet by mouth once daily. 90 tablet 1     No facility-administered encounter medications on file as of 3/6/2023.       Review of patient's allergies indicates:  No Known Allergies      Physical Exam      Vital Signs  Pulse: 63  SpO2: 96 %  BP: 138/78  BP Location: Right arm  Patient Position: Sitting  Height and Weight  Height: 6' (182.9 cm)  Weight: 115.8 kg (255 lb 4.7 oz)  BSA (Calculated - sq m): 2.43 sq meters  BMI (Calculated): 34.6  Weight in (lb) to have BMI = 25: 183.9]    Physical Exam  Vitals reviewed.   Constitutional:       Appearance: He is well-developed. He is not diaphoretic.   HENT:      Head: Normocephalic and atraumatic.      Right Ear: External ear normal.      Left Ear: External ear normal.   Eyes:      General: No scleral  icterus.        Right eye: No discharge.         Left eye: No discharge.      Conjunctiva/sclera: Conjunctivae normal.   Cardiovascular:      Rate and Rhythm: Normal rate and regular rhythm.      Heart sounds: Normal heart sounds.   Pulmonary:      Effort: Pulmonary effort is normal. No respiratory distress.      Breath sounds: Normal breath sounds.   Musculoskeletal:         General: Normal range of motion.      Cervical back: Normal range of motion.   Neurological:      Mental Status: He is alert and oriented to person, place, and time.   Psychiatric:         Behavior: Behavior normal.         Thought Content: Thought content normal.         Judgment: Judgment normal.        Laboratory:  CBC:  No results for input(s): WBC, RBC, HGB, HCT, PLT, MCV, MCH, MCHC in the last 2160 hours.  CMP:  No results for input(s): GLU, CALCIUM, ALBUMIN, PROT, NA, K, CO2, CL, BUN, ALKPHOS, ALT, AST, BILITOT in the last 2160 hours.    Invalid input(s): CREATININ  URINALYSIS:  No results for input(s): COLORU, CLARITYU, SPECGRAV, PHUR, PROTEINUA, GLUCOSEU, BILIRUBINCON, BLOODU, WBCU, RBCU, BACTERIA, MUCUS, NITRITE, LEUKOCYTESUR, UROBILINOGEN, HYALINECASTS in the last 2160 hours.   LIPIDS:  No results for input(s): TSH, HDL, CHOL, TRIG, LDLCALC, CHOLHDL, NONHDLCHOL, TOTALCHOLEST in the last 2160 hours.  TSH:  No results for input(s): TSH in the last 2160 hours.  A1C:  Recent Labs   Lab Result Units 02/27/23  1101   Hemoglobin A1C % 6.8*           Assessment/Plan     Yuriy Jerome is a 67 y.o.male with:    1. Wellness examination    2. Controlled type 2 diabetes mellitus without complication, without long-term current use of insulin  -     metFORMIN (GLUCOPHAGE) 1000 MG tablet; Take 1 tablet (1,000 mg total) by mouth Daily.  Dispense: 90 tablet; Refill: 1    3. Essential hypertension  Assessment & Plan:  At goal continue meds       4. Hyperlipidemia, unspecified hyperlipidemia type  Assessment & Plan:  Continue meds       5. Benign  prostatic hyperplasia, unspecified whether lower urinary tract symptoms present  Assessment & Plan:  Per urology           Future Appointments   Date Time Provider Department Center   3/14/2023  1:30 PM [facility]   9/11/2023  1:00 PM Althea Jerome MD [facility]       Althea Jerome MD  3/6/2023 11:43 AM    Primary Care Internal Medicine

## 2023-03-06 NOTE — PROGRESS NOTES
Health Maintenance Due   Topic Date Due    Foot Exam  Never done    TETANUS VACCINE  Never done    Shingles Vaccine (1 of 2) Never done     Triggered LINKS. Updated Care Everywhere. Imported outside diabetic eye exam results received from Dr. Cheryl Drew into . Chart review completed.

## 2023-03-12 ENCOUNTER — PATIENT MESSAGE (OUTPATIENT)
Dept: PRIMARY CARE CLINIC | Facility: CLINIC | Age: 68
End: 2023-03-12
Payer: MEDICARE

## 2023-03-14 ENCOUNTER — TELEPHONE (OUTPATIENT)
Dept: UROLOGY | Facility: CLINIC | Age: 68
End: 2023-03-14
Payer: MEDICARE

## 2023-03-14 NOTE — TELEPHONE ENCOUNTER
Called patient to see about getting him rescheduled as Dr. Greene is not in clinic today. Pt did not answer, left voicemail.

## 2023-03-17 ENCOUNTER — PATIENT OUTREACH (OUTPATIENT)
Dept: ADMINISTRATIVE | Facility: HOSPITAL | Age: 68
End: 2023-03-17
Payer: MEDICARE

## 2023-03-17 NOTE — PROGRESS NOTES
Health Maintenance Due   Topic Date Due    Foot Exam  Never done    TETANUS VACCINE  Never done    Shingles Vaccine (1 of 2) Never done     Chart review done. HM updated. Immunizations reviewed & updated. Care Everywhere updated.  Confirmed receipt of DM Eye Exam.

## 2023-03-17 NOTE — PROGRESS NOTES
Health Maintenance Due   Topic Date Due    Foot Exam  Never done    TETANUS VACCINE  Never done    Shingles Vaccine (1 of 2) Never done     Chart review done. HM updated. Immunizations reviewed & updated. Care Everywhere updated.  Confirmed receipt of DM Eye Exam

## 2023-04-10 NOTE — PROGRESS NOTES
Subjective:      Yuriy Jerome is a 67 y.o. male who returns today regarding his     Doing well    On proscar and muyrbetriq    Saw Dr Reece Del Torobetriq 50mg resolved LUTS    .    The following portions of the patient's history were reviewed and updated as appropriate: allergies, current medications, past family history, past medical history, past social history, past surgical history and problem list.    Review of Systems  Pertinent items are noted in HPI.  A comprehensive multipoint review of systems was negative except as otherwise stated in the HPI.    Past Medical History:   Diagnosis Date    Allergy     Diabetes mellitus     prediabetic    Sleep apnea     Staphylococcal infection     left ankle, 2017     Past Surgical History:   Procedure Laterality Date    ADENOIDECTOMY  2001    UPPP surgery    APPENDECTOMY  2005    BICEPS TENDON REPAIR Left 12/28/2021    EYE SURGERY  2017    Cararact surgery    FOOT SURGERY Left 05/2017    subtalar implant, 6 weeks later removed due to failure    FOOT SURGERY Left 05/19/2022    HERNIA REPAIR      DOUBLE age 8    JOINT REPLACEMENT  2009    Bilateral knee replacement    KNEE SURGERY Bilateral 05/2011    TKR, had ligament replacement in right , meniscus repair both knees    LAMINECTOMY  1989    spinal    SPINE SURGERY  1991    Triple laminectomy    TONSILLECTOMY  2001    UPPP surgery    UVULOPALATOPHARYNGOPLASTY  2007    VASECTOMY  1985     Diabetes Medications               metFORMIN (GLUCOPHAGE) 1000 MG tablet Take 1 tablet (1,000 mg total) by mouth Daily.          Review of patient's allergies indicates:  No Known Allergies       Objective:   Vitals: There were no vitals taken for this visit.    Physical Exam   General: alert and oriented, no acute distress  Respiratory: Symmetric expansion, non-labored breathing  Cardiovascular: no peripheral edema  Abdomen: soft, non distended  Skin: normal coloration and turgor, no rashes, no suspicious skin lesions noted  Neuro:  no gross deficits  Psych: normal judgment and insight, normal mood/affect, and non-anxious  CHELLY unchanged; prominent nodule on right; previous neg biopsy    Physical Exam    Lab Review   Urinalysis demonstrates no specimen    Lab Results   Component Value Date    WBC 5.74 01/19/2012    HGB 15.9 01/19/2012    HCT 46.2 01/19/2012    MCV 93.9 01/19/2012     01/19/2012     Lab Results   Component Value Date    CREATININE 1.0 04/13/2022    BUN 16 04/13/2022     Lab Results   Component Value Date    PSA 7.4 (H) 10/09/2009    PSA 3.0 11/11/2005    PSA 1.7 01/09/2004    PSADIAG 4.1 (H) 02/27/2023    PSADIAG 4.6 (H) 03/07/2022    PSADIAG 3.8 03/03/2021         Imaging  MRI PROSTATE W W/O CONTRAST     CLINICAL HISTORY:  psa 4.6; nodule on right was hemangioma on previous outside imaging;  Benign prostatic hyperplasia without lower urinary tract symptoms     TECHNIQUE:  Multiparametric MRI of the prostate/pelvis performed on a 3T scanner with phase pelvic coil. Multiplanar, multisequence images including high resolution, small field-of-view T2-WI; axial diffusion weighted images with multiple B-values and creation of ADC-maps; and dynamic contrast enhanced T1-weighted images through the prostate were obtained before, during, and after the administration of 10 cc intravenous gadolinium.     COMPARISON:  None     FINDINGS:  Previous biopsy: None     PSA: 4.6 ng/mL 03/07/2022     Prior therapy: None     Prostate: 6.4 x 4.6 x 6.8 cm corresponding to a computed volume of 108.67 cc.     Peripheral zone: No focal abnormalities with imaging features concerning for prostate cancer, score 1.     Transitional zone: Benign prostatic hyperplasia without focal suspicious abnormality, score 2.     Lesion (THAI) #T-1     Location: Side:Left; Region: Base and mid; Zone: posterior     Greatest dimension: 1.7 cm     T2-WI: Heterogeneous signal intensity withobscured margins, score 3.     DWI/ADC: Focal (discrete and different from the  background) hypointense on ADC and/or focal hyperintense on high b-value DWI; may be markedly hypointense on ADC or markedly hyperintense on high b-value DWI, but not both, score 3.     DCE: Positive     Extraprostatic extension: Negative     PI-RADS assessment category: 3     Neurovascular bundle: Normal appearance.     Seminal vesicles: Normal appearance.     Adjacent Organ Involvement: No evidence for urinary bladder or rectal invasion.     Lymphadenopathy: None.     Other Findings: None.     Impression:     Benign prostatic hyperplasia with indeterminate lesion in the left mid and base posterior transitional zone.     Overall Assessment: PI-RADS 3 - Intermediate (the presence of clinically significant cancer is equivocal)     Number of targets created for potential MR/US fusion biopsy     Peripheral zone: 0     Transition zone: 1     Electronically signed by resident: Alisa Frias  Date:                                            04/18/2022  Time:                                           16:13     Electronically signed by: Anshul Sarmiento MD  Date:                                            04/19/2022  Time:                                           08:56           Exam Ended: 04/18/22 16:09           Assessment and Plan:   Enlarged prostate 109g    Urgency incontinence    OAB (overactive bladder)          Cont proscare and myrbetriq 50mg  RTC 1 yr with psa for CHELLY    If LUTS recur, see Dr Newell again for urodynamics evaluation

## 2023-04-11 ENCOUNTER — OFFICE VISIT (OUTPATIENT)
Dept: UROLOGY | Facility: CLINIC | Age: 68
End: 2023-04-11
Payer: MEDICARE

## 2023-04-11 VITALS
SYSTOLIC BLOOD PRESSURE: 138 MMHG | HEIGHT: 72 IN | OXYGEN SATURATION: 95 % | RESPIRATION RATE: 16 BRPM | HEART RATE: 71 BPM | WEIGHT: 252.38 LBS | DIASTOLIC BLOOD PRESSURE: 83 MMHG | BODY MASS INDEX: 34.18 KG/M2

## 2023-04-11 DIAGNOSIS — N39.41 URGENCY INCONTINENCE: ICD-10-CM

## 2023-04-11 DIAGNOSIS — N32.81 OAB (OVERACTIVE BLADDER): ICD-10-CM

## 2023-04-11 DIAGNOSIS — N40.0 ENLARGED PROSTATE: Primary | ICD-10-CM

## 2023-04-11 PROCEDURE — 3008F BODY MASS INDEX DOCD: CPT | Mod: CPTII,S$GLB,, | Performed by: UROLOGY

## 2023-04-11 PROCEDURE — 99999 PR PBB SHADOW E&M-EST. PATIENT-LVL III: ICD-10-PCS | Mod: PBBFAC,,, | Performed by: UROLOGY

## 2023-04-11 PROCEDURE — 3288F PR FALLS RISK ASSESSMENT DOCUMENTED: ICD-10-PCS | Mod: CPTII,S$GLB,, | Performed by: UROLOGY

## 2023-04-11 PROCEDURE — 1126F AMNT PAIN NOTED NONE PRSNT: CPT | Mod: CPTII,S$GLB,, | Performed by: UROLOGY

## 2023-04-11 PROCEDURE — 1159F PR MEDICATION LIST DOCUMENTED IN MEDICAL RECORD: ICD-10-PCS | Mod: CPTII,S$GLB,, | Performed by: UROLOGY

## 2023-04-11 PROCEDURE — 1159F MED LIST DOCD IN RCRD: CPT | Mod: CPTII,S$GLB,, | Performed by: UROLOGY

## 2023-04-11 PROCEDURE — 3079F DIAST BP 80-89 MM HG: CPT | Mod: CPTII,S$GLB,, | Performed by: UROLOGY

## 2023-04-11 PROCEDURE — 3075F PR MOST RECENT SYSTOLIC BLOOD PRESS GE 130-139MM HG: ICD-10-PCS | Mod: CPTII,S$GLB,, | Performed by: UROLOGY

## 2023-04-11 PROCEDURE — 99214 PR OFFICE/OUTPT VISIT, EST, LEVL IV, 30-39 MIN: ICD-10-PCS | Mod: S$GLB,,, | Performed by: UROLOGY

## 2023-04-11 PROCEDURE — 1126F PR PAIN SEVERITY QUANTIFIED, NO PAIN PRESENT: ICD-10-PCS | Mod: CPTII,S$GLB,, | Performed by: UROLOGY

## 2023-04-11 PROCEDURE — 3079F PR MOST RECENT DIASTOLIC BLOOD PRESSURE 80-89 MM HG: ICD-10-PCS | Mod: CPTII,S$GLB,, | Performed by: UROLOGY

## 2023-04-11 PROCEDURE — 3044F PR MOST RECENT HEMOGLOBIN A1C LEVEL <7.0%: ICD-10-PCS | Mod: CPTII,S$GLB,, | Performed by: UROLOGY

## 2023-04-11 PROCEDURE — 3008F PR BODY MASS INDEX (BMI) DOCUMENTED: ICD-10-PCS | Mod: CPTII,S$GLB,, | Performed by: UROLOGY

## 2023-04-11 PROCEDURE — 3288F FALL RISK ASSESSMENT DOCD: CPT | Mod: CPTII,S$GLB,, | Performed by: UROLOGY

## 2023-04-11 PROCEDURE — 3075F SYST BP GE 130 - 139MM HG: CPT | Mod: CPTII,S$GLB,, | Performed by: UROLOGY

## 2023-04-11 PROCEDURE — 3044F HG A1C LEVEL LT 7.0%: CPT | Mod: CPTII,S$GLB,, | Performed by: UROLOGY

## 2023-04-11 PROCEDURE — 99999 PR PBB SHADOW E&M-EST. PATIENT-LVL III: CPT | Mod: PBBFAC,,, | Performed by: UROLOGY

## 2023-04-11 PROCEDURE — 1101F PR PT FALLS ASSESS DOC 0-1 FALLS W/OUT INJ PAST YR: ICD-10-PCS | Mod: CPTII,S$GLB,, | Performed by: UROLOGY

## 2023-04-11 PROCEDURE — 99214 OFFICE O/P EST MOD 30 MIN: CPT | Mod: S$GLB,,, | Performed by: UROLOGY

## 2023-04-11 PROCEDURE — 1101F PT FALLS ASSESS-DOCD LE1/YR: CPT | Mod: CPTII,S$GLB,, | Performed by: UROLOGY

## 2023-04-11 RX ORDER — FINASTERIDE 5 MG/1
5 TABLET, FILM COATED ORAL DAILY
Qty: 90 TABLET | Refills: 3 | Status: SHIPPED | OUTPATIENT
Start: 2023-04-11

## 2023-04-11 RX ORDER — MIRABEGRON 50 MG/1
1 TABLET, FILM COATED, EXTENDED RELEASE ORAL DAILY
Qty: 90 TABLET | Refills: 3 | Status: SHIPPED | OUTPATIENT
Start: 2023-04-11

## 2023-04-21 ENCOUNTER — PATIENT MESSAGE (OUTPATIENT)
Dept: ADMINISTRATIVE | Facility: HOSPITAL | Age: 68
End: 2023-04-21
Payer: MEDICARE

## 2023-06-11 ENCOUNTER — PATIENT MESSAGE (OUTPATIENT)
Dept: PRIMARY CARE CLINIC | Facility: CLINIC | Age: 68
End: 2023-06-11
Payer: MEDICARE

## 2023-06-12 ENCOUNTER — PATIENT MESSAGE (OUTPATIENT)
Dept: PRIMARY CARE CLINIC | Facility: CLINIC | Age: 68
End: 2023-06-12
Payer: MEDICARE

## 2023-06-12 DIAGNOSIS — Z12.11 SCREENING FOR COLON CANCER: ICD-10-CM

## 2023-06-12 RX ORDER — ROSUVASTATIN CALCIUM 20 MG/1
20 TABLET, COATED ORAL DAILY
Qty: 90 TABLET | Refills: 1 | Status: SHIPPED | OUTPATIENT
Start: 2023-06-12 | End: 2023-12-02

## 2023-06-13 ENCOUNTER — PATIENT MESSAGE (OUTPATIENT)
Dept: PRIMARY CARE CLINIC | Facility: CLINIC | Age: 68
End: 2023-06-13
Payer: MEDICARE

## 2023-06-16 ENCOUNTER — PATIENT MESSAGE (OUTPATIENT)
Dept: PRIMARY CARE CLINIC | Facility: CLINIC | Age: 68
End: 2023-06-16
Payer: MEDICARE

## 2023-06-16 DIAGNOSIS — I10 ESSENTIAL HYPERTENSION: Primary | ICD-10-CM

## 2023-06-16 RX ORDER — AMLODIPINE BESYLATE 5 MG/1
5 TABLET ORAL DAILY
Qty: 90 TABLET | Refills: 1 | Status: SHIPPED | OUTPATIENT
Start: 2023-06-16 | End: 2023-12-05

## 2023-06-16 NOTE — LETTER
AUTHORIZATION FOR RELEASE OF   CONFIDENTIAL INFORMATION    Dear Dr. Hoang,    We are seeing Yuriy Jerome, date of birth 1955, in the clinic at [facility]. Althea Jerome MD is the patient's PCP. Yuriy Jerome has an outstanding lab/procedure at the time we reviewed his chart. In order to help keep his health information updated, he has authorized us to request the following medical record(s):        (  )  MAMMOGRAM                                      ( X )  COLONOSCOPY      (  )  PAP SMEAR                                          (  )  OUTSIDE LAB RESULTS     (  )  DEXA SCAN                                          (  )  EYE EXAM            (  )  FOOT EXAM                                          (  )  ENTIRE RECORD     (  )  OUTSIDE IMMUNIZATIONS                 (  )  _______________         Please fax records to [facility], Abby E. Gandolfi, MD, [phone]     If you have any questions, please contact Alexus at [phone].           Patient Name: Yuriy Jerome  : 1955  Patient Phone #: [phone]

## 2023-06-19 ENCOUNTER — PATIENT OUTREACH (OUTPATIENT)
Dept: ADMINISTRATIVE | Facility: HOSPITAL | Age: 68
End: 2023-06-19
Payer: MEDICARE

## 2023-06-19 NOTE — PROGRESS NOTES
Health Maintenance Due   Topic Date Due    Foot Exam  Never done    TETANUS VACCINE  Never done    Shingles Vaccine (1 of 2) Never done    COVID-19 Vaccine (6 - Moderna series) 03/05/2023    Diabetes Urine Screening  08/17/2023     Triggered LINKS and reconciled immunizations. Updated Care Everywhere. Imported outside colonoscopy results received from [facility] into .  frequency set to repeat colonoscopy in 5 yrs per provider recommendation. Chart review completed.

## 2023-06-22 ENCOUNTER — PATIENT MESSAGE (OUTPATIENT)
Dept: PRIMARY CARE CLINIC | Facility: CLINIC | Age: 68
End: 2023-06-22
Payer: MEDICARE

## 2023-06-22 DIAGNOSIS — K60.2 ANAL FISSURE: Primary | ICD-10-CM

## 2023-07-02 LAB — HEMOCCULT STL QL IA: NEGATIVE

## 2023-07-27 ENCOUNTER — TELEPHONE (OUTPATIENT)
Dept: SURGERY | Facility: CLINIC | Age: 68
End: 2023-07-27
Payer: MEDICARE

## 2023-07-28 ENCOUNTER — OFFICE VISIT (OUTPATIENT)
Dept: SURGERY | Facility: CLINIC | Age: 68
End: 2023-07-28
Payer: MEDICARE

## 2023-07-28 VITALS
HEIGHT: 72 IN | WEIGHT: 261.44 LBS | HEART RATE: 67 BPM | RESPIRATION RATE: 18 BRPM | SYSTOLIC BLOOD PRESSURE: 134 MMHG | DIASTOLIC BLOOD PRESSURE: 74 MMHG | BODY MASS INDEX: 35.41 KG/M2

## 2023-07-28 DIAGNOSIS — K60.2 ANAL FISSURE: Primary | ICD-10-CM

## 2023-07-28 PROCEDURE — 99999 PR PBB SHADOW E&M-EST. PATIENT-LVL IV: ICD-10-PCS | Mod: PBBFAC,,, | Performed by: COLON & RECTAL SURGERY

## 2023-07-28 PROCEDURE — 1160F RVW MEDS BY RX/DR IN RCRD: CPT | Mod: CPTII,S$GLB,, | Performed by: COLON & RECTAL SURGERY

## 2023-07-28 PROCEDURE — 99999 PR PBB SHADOW E&M-EST. PATIENT-LVL IV: CPT | Mod: PBBFAC,,, | Performed by: COLON & RECTAL SURGERY

## 2023-07-28 PROCEDURE — 1101F PT FALLS ASSESS-DOCD LE1/YR: CPT | Mod: CPTII,S$GLB,, | Performed by: COLON & RECTAL SURGERY

## 2023-07-28 PROCEDURE — 3288F PR FALLS RISK ASSESSMENT DOCUMENTED: ICD-10-PCS | Mod: CPTII,S$GLB,, | Performed by: COLON & RECTAL SURGERY

## 2023-07-28 PROCEDURE — 3075F PR MOST RECENT SYSTOLIC BLOOD PRESS GE 130-139MM HG: ICD-10-PCS | Mod: CPTII,S$GLB,, | Performed by: COLON & RECTAL SURGERY

## 2023-07-28 PROCEDURE — 99203 PR OFFICE/OUTPT VISIT, NEW, LEVL III, 30-44 MIN: ICD-10-PCS | Mod: S$GLB,,, | Performed by: COLON & RECTAL SURGERY

## 2023-07-28 PROCEDURE — 3075F SYST BP GE 130 - 139MM HG: CPT | Mod: CPTII,S$GLB,, | Performed by: COLON & RECTAL SURGERY

## 2023-07-28 PROCEDURE — 3078F DIAST BP <80 MM HG: CPT | Mod: CPTII,S$GLB,, | Performed by: COLON & RECTAL SURGERY

## 2023-07-28 PROCEDURE — 3008F PR BODY MASS INDEX (BMI) DOCUMENTED: ICD-10-PCS | Mod: CPTII,S$GLB,, | Performed by: COLON & RECTAL SURGERY

## 2023-07-28 PROCEDURE — 1126F AMNT PAIN NOTED NONE PRSNT: CPT | Mod: CPTII,S$GLB,, | Performed by: COLON & RECTAL SURGERY

## 2023-07-28 PROCEDURE — 1159F MED LIST DOCD IN RCRD: CPT | Mod: CPTII,S$GLB,, | Performed by: COLON & RECTAL SURGERY

## 2023-07-28 PROCEDURE — 1159F PR MEDICATION LIST DOCUMENTED IN MEDICAL RECORD: ICD-10-PCS | Mod: CPTII,S$GLB,, | Performed by: COLON & RECTAL SURGERY

## 2023-07-28 PROCEDURE — 1126F PR PAIN SEVERITY QUANTIFIED, NO PAIN PRESENT: ICD-10-PCS | Mod: CPTII,S$GLB,, | Performed by: COLON & RECTAL SURGERY

## 2023-07-28 PROCEDURE — 99203 OFFICE O/P NEW LOW 30 MIN: CPT | Mod: S$GLB,,, | Performed by: COLON & RECTAL SURGERY

## 2023-07-28 PROCEDURE — 1101F PR PT FALLS ASSESS DOC 0-1 FALLS W/OUT INJ PAST YR: ICD-10-PCS | Mod: CPTII,S$GLB,, | Performed by: COLON & RECTAL SURGERY

## 2023-07-28 PROCEDURE — 1160F PR REVIEW ALL MEDS BY PRESCRIBER/CLIN PHARMACIST DOCUMENTED: ICD-10-PCS | Mod: CPTII,S$GLB,, | Performed by: COLON & RECTAL SURGERY

## 2023-07-28 PROCEDURE — 3078F PR MOST RECENT DIASTOLIC BLOOD PRESSURE < 80 MM HG: ICD-10-PCS | Mod: CPTII,S$GLB,, | Performed by: COLON & RECTAL SURGERY

## 2023-07-28 PROCEDURE — 3288F FALL RISK ASSESSMENT DOCD: CPT | Mod: CPTII,S$GLB,, | Performed by: COLON & RECTAL SURGERY

## 2023-07-28 PROCEDURE — 3044F HG A1C LEVEL LT 7.0%: CPT | Mod: CPTII,S$GLB,, | Performed by: COLON & RECTAL SURGERY

## 2023-07-28 PROCEDURE — 3044F PR MOST RECENT HEMOGLOBIN A1C LEVEL <7.0%: ICD-10-PCS | Mod: CPTII,S$GLB,, | Performed by: COLON & RECTAL SURGERY

## 2023-07-28 PROCEDURE — 3008F BODY MASS INDEX DOCD: CPT | Mod: CPTII,S$GLB,, | Performed by: COLON & RECTAL SURGERY

## 2023-07-28 NOTE — PROGRESS NOTES
CRS Office Visit History and Physical    Referring Md:   Althea Jerome Md  [address]  [address]  [place] [address]    SUBJECTIVE:     Chief Complaint: anal fissure    History of Present Illness:  The patient is a new patient to this practice.   Course is as follows:  Patient is a 67 y.o. male presents with anal fissure  5 weeks ago, he had constipation followed by a tearing sensation in the anal canal associated with bleeding.  Significant pain afterwards for weeks that worsening with BM and wiping.  Now has slowly improved.  No further bleeding.  No prior similar episodes.  He hs now having his normal BM.   No past anorectal surgeries.  Non smoker.  Active. Takes probiotics daily.       Last Colonoscopy: 12/21/2020:   - 6mm cecal polyp --> sessile serrated polyp   - FIT negative 6/2023    Review of patient's allergies indicates:   Allergen Reactions    Hay fever and allergy relief Other (See Comments)       Past Medical History:   Diagnosis Date    Allergy     Diabetes mellitus     prediabetic    Sleep apnea     Staphylococcal infection     left ankle, 2017     Past Surgical History:   Procedure Laterality Date    ADENOIDECTOMY  2001    UPPP surgery    APPENDECTOMY  2005    BICEPS TENDON REPAIR Left 12/28/2021    EYE SURGERY  2017    Cararact surgery    FOOT SURGERY Left 05/2017    subtalar implant, 6 weeks later removed due to failure    FOOT SURGERY Left 05/19/2022    HERNIA REPAIR      DOUBLE age 8    JOINT REPLACEMENT  2009    Bilateral knee replacement    KNEE SURGERY Bilateral 05/2011    TKR, had ligament replacement in right , meniscus repair both knees    LAMINECTOMY  1989    spinal    SPINE SURGERY  1991    Triple laminectomy    TONSILLECTOMY  2001    UPPP surgery    UVULOPALATOPHARYNGOPLASTY  2007    VASECTOMY  1985     Family History   Problem Relation Age of Onset    Arthritis Mother     Asthma Father     Heart disease Father     Cancer Maternal Grandfather     Cancer Maternal  Grandmother     Drug abuse Brother     Drug abuse Brother     Drug abuse Brother     Drug abuse Brother      Social History     Tobacco Use    Smoking status: Never    Smokeless tobacco: Never   Substance Use Topics    Alcohol use: Yes     Alcohol/week: 11.0 standard drinks     Types: 7 Glasses of wine, 4 Cans of beer per week    Drug use: No        Review of Systems:  Review of Systems   Constitutional:  Negative for chills, diaphoresis, fever, malaise/fatigue and weight loss.   HENT:  Negative for congestion.    Respiratory:  Negative for shortness of breath.    Cardiovascular:  Negative for chest pain and leg swelling.   Gastrointestinal:  Positive for diarrhea. Negative for abdominal pain, blood in stool, constipation, nausea and vomiting.   Genitourinary:  Negative for dysuria.   Musculoskeletal:  Negative for back pain and myalgias.   Skin:  Negative for rash.   Neurological:  Negative for dizziness and weakness.   Endo/Heme/Allergies:  Does not bruise/bleed easily.   Psychiatric/Behavioral:  Negative for depression.      OBJECTIVE:     Vital Signs (Most Recent)  /74 (BP Location: Right arm, Patient Position: Sitting, BP Method: Large (Automatic))   Pulse 67   Resp 18   Ht 6' (1.829 m)   Wt 118.6 kg (261 lb 7.5 oz)   BMI 35.46 kg/m²     Physical Exam:  General: White male in no distress   Neuro: alert and oriented x 4.  Moves all extremities.     HEENT: no icterus.  Trachea midline  Respiratory: respirations are even and unlabored  Cardiac: regular rate  Abdomen: soft, nontender, no masses  Extremities: Warm dry and intact  Skin: no rashes  Anorectal: External exam with small external hemorrhoid thrombosis posteriorly.  Hypertonic anal sphincter tone.  Tender anterior midline.  No fissure seen on eversion of the anal canal.  Digital exam deferred due to discomfort.      Labs: no labs    Imaging: none      ASSESSMENT/PLAN:     Yuriy was seen today for anal fistula.    Diagnoses and all orders for this  visit:    Anal fissure  -     diltiazem HCl (DILTIAZEM 2% CREAM); Apply topically 3 (three) times daily. Apply topically to anal area.    Other orders  -     Discontinue: diltiazem HCl (DILTIAZEM 2% CREAM); Apply topically 3 (three) times daily. Apply topically to anal area.        67 y.o. male with history and exam most consistent with acute anterior midline anal fissure that has healed.  Recommended metamucil to help with stool regularity as he has had diarrhea following his constipation episode.  Also recommended that he continue his probiotics.  Topical diltiazem given if he has worsening pain.  He will follow up with me if there is no improvement.  Repeat colonoscopy 2025. He does not need FIT testing in the interim.       AVINASH Saxena MD, FACS, FASCRS  Staff Surgeon  Colon & Rectal Surgery

## 2023-08-15 NOTE — TELEPHONE ENCOUNTER
Refill Routing Note   Medication(s) are not appropriate for processing by [facility] for the following reason(s):      Required labs outdated    [facility] action(s):  Defer Care Due:  Labs due            Appointments  past 12m or future 3m with PCP    Date Provider   Last Visit   3/6/2023 Althea Jerome MD   Next Visit   9/11/2023 Althea Jerome MD   ED visits in past 90 days: 0        Note composed:11:42 AM 08/15/2023

## 2023-08-15 NOTE — TELEPHONE ENCOUNTER
Care Due:                  Date            Visit Type   Department     Provider  --------------------------------------------------------------------------------                                EP -                              PRIMARY  Last Visit: 03-      CARE ([facility])   None Found     Althea Jerome                              EP -                              PRIMARY  Next Visit: 09-      CARE ([facility])   None Found     Althea Jerome                                                            Last  Test          Frequency    Reason                     Performed    Due Date  --------------------------------------------------------------------------------    CMP.........  12 months..  losartan-hydrochlorothiaz  Not Found    Overdue                             yeyo, metFORMIN,                             rosuvastatin.............    HBA1C.......  6 months...  metFORMIN................  02- 08-    Lipid Panel.  12 months..  rosuvastatin.............  08- 08-    Health [facility] Embedded Care Due Messages. Reference number: [id number].   8/15/2023 11:26:21 AM CDT

## 2023-08-17 RX ORDER — LOSARTAN POTASSIUM AND HYDROCHLOROTHIAZIDE 12.5; 1 MG/1; MG/1
1 TABLET ORAL
Qty: 90 TABLET | Refills: 1 | Status: SHIPPED | OUTPATIENT
Start: 2023-08-17 | End: 2024-03-02

## 2023-09-08 ENCOUNTER — LAB VISIT (OUTPATIENT)
Dept: LAB | Facility: HOSPITAL | Age: 68
End: 2023-09-08
Attending: INTERNAL MEDICINE
Payer: MEDICARE

## 2023-09-08 DIAGNOSIS — N32.81 OAB (OVERACTIVE BLADDER): ICD-10-CM

## 2023-09-08 DIAGNOSIS — N39.41 URGENCY INCONTINENCE: ICD-10-CM

## 2023-09-08 DIAGNOSIS — I10 ESSENTIAL HYPERTENSION: ICD-10-CM

## 2023-09-08 DIAGNOSIS — N40.0 ENLARGED PROSTATE: ICD-10-CM

## 2023-09-08 DIAGNOSIS — E78.5 HYPERLIPIDEMIA, UNSPECIFIED HYPERLIPIDEMIA TYPE: ICD-10-CM

## 2023-09-08 DIAGNOSIS — E11.9 CONTROLLED TYPE 2 DIABETES MELLITUS WITHOUT COMPLICATION, WITHOUT LONG-TERM CURRENT USE OF INSULIN: ICD-10-CM

## 2023-09-08 LAB
ALBUMIN SERPL BCP-MCNC: 4.3 G/DL (ref 3.5–5.2)
ALP SERPL-CCNC: 57 U/L (ref 55–135)
ALT SERPL W/O P-5'-P-CCNC: 34 U/L (ref 10–44)
ANION GAP SERPL CALC-SCNC: 9 MMOL/L (ref 8–16)
AST SERPL-CCNC: 21 U/L (ref 10–40)
BILIRUB SERPL-MCNC: 0.6 MG/DL (ref 0.1–1)
BUN SERPL-MCNC: 16 MG/DL (ref 8–23)
CALCIUM SERPL-MCNC: 9.9 MG/DL (ref 8.7–10.5)
CHLORIDE SERPL-SCNC: 103 MMOL/L (ref 95–110)
CHOLEST SERPL-MCNC: 137 MG/DL (ref 120–199)
CHOLEST/HDLC SERPL: 3.7 {RATIO} (ref 2–5)
CO2 SERPL-SCNC: 28 MMOL/L (ref 23–29)
COMPLEXED PSA SERPL-MCNC: 4.1 NG/ML (ref 0–4)
CREAT SERPL-MCNC: 1 MG/DL (ref 0.5–1.4)
ERYTHROCYTE [DISTWIDTH] IN BLOOD BY AUTOMATED COUNT: 12.8 % (ref 11.5–14.5)
EST. GFR  (NO RACE VARIABLE): >60 ML/MIN/1.73 M^2
ESTIMATED AVG GLUCOSE: 157 MG/DL (ref 68–131)
GLUCOSE SERPL-MCNC: 153 MG/DL (ref 70–110)
HBA1C MFR BLD: 7.1 % (ref 4–5.6)
HCT VFR BLD AUTO: 45.3 % (ref 40–54)
HDLC SERPL-MCNC: 37 MG/DL (ref 40–75)
HDLC SERPL: 27 % (ref 20–50)
HGB BLD-MCNC: 14.9 G/DL (ref 14–18)
LDLC SERPL CALC-MCNC: 65.2 MG/DL (ref 63–159)
MCH RBC QN AUTO: 31 PG (ref 27–31)
MCHC RBC AUTO-ENTMCNC: 32.9 G/DL (ref 32–36)
MCV RBC AUTO: 94 FL (ref 82–98)
NONHDLC SERPL-MCNC: 100 MG/DL
PLATELET # BLD AUTO: 234 K/UL (ref 150–450)
PMV BLD AUTO: 10.5 FL (ref 9.2–12.9)
POTASSIUM SERPL-SCNC: 4.6 MMOL/L (ref 3.5–5.1)
PROT SERPL-MCNC: 6.9 G/DL (ref 6–8.4)
RBC # BLD AUTO: 4.8 M/UL (ref 4.6–6.2)
SODIUM SERPL-SCNC: 140 MMOL/L (ref 136–145)
TRIGL SERPL-MCNC: 174 MG/DL (ref 30–150)
WBC # BLD AUTO: 6.35 K/UL (ref 3.9–12.7)

## 2023-09-08 PROCEDURE — 80061 LIPID PANEL: CPT | Performed by: INTERNAL MEDICINE

## 2023-09-08 PROCEDURE — 85027 COMPLETE CBC AUTOMATED: CPT | Performed by: INTERNAL MEDICINE

## 2023-09-08 PROCEDURE — 84153 ASSAY OF PSA TOTAL: CPT | Performed by: UROLOGY

## 2023-09-08 PROCEDURE — 80053 COMPREHEN METABOLIC PANEL: CPT | Performed by: INTERNAL MEDICINE

## 2023-09-08 PROCEDURE — 36415 COLL VENOUS BLD VENIPUNCTURE: CPT | Performed by: UROLOGY

## 2023-09-08 PROCEDURE — 83036 HEMOGLOBIN GLYCOSYLATED A1C: CPT | Performed by: INTERNAL MEDICINE

## 2023-09-11 ENCOUNTER — OFFICE VISIT (OUTPATIENT)
Dept: PRIMARY CARE CLINIC | Facility: CLINIC | Age: 68
End: 2023-09-11
Payer: MEDICARE

## 2023-09-11 VITALS
DIASTOLIC BLOOD PRESSURE: 82 MMHG | HEIGHT: 72 IN | SYSTOLIC BLOOD PRESSURE: 138 MMHG | OXYGEN SATURATION: 96 % | WEIGHT: 264.56 LBS | BODY MASS INDEX: 35.83 KG/M2 | HEART RATE: 73 BPM

## 2023-09-11 DIAGNOSIS — E11.9 CONTROLLED TYPE 2 DIABETES MELLITUS WITHOUT COMPLICATION, WITHOUT LONG-TERM CURRENT USE OF INSULIN: ICD-10-CM

## 2023-09-11 DIAGNOSIS — E11.69 TYPE 2 DIABETES MELLITUS WITH OTHER SPECIFIED COMPLICATION, WITHOUT LONG-TERM CURRENT USE OF INSULIN: Primary | ICD-10-CM

## 2023-09-11 DIAGNOSIS — E66.01 MORBID (SEVERE) OBESITY DUE TO EXCESS CALORIES: ICD-10-CM

## 2023-09-11 DIAGNOSIS — I10 ESSENTIAL HYPERTENSION: ICD-10-CM

## 2023-09-11 PROCEDURE — 3079F PR MOST RECENT DIASTOLIC BLOOD PRESSURE 80-89 MM HG: ICD-10-PCS | Mod: CPTII,S$GLB,, | Performed by: INTERNAL MEDICINE

## 2023-09-11 PROCEDURE — 99999 PR PBB SHADOW E&M-EST. PATIENT-LVL III: ICD-10-PCS | Mod: PBBFAC,,, | Performed by: INTERNAL MEDICINE

## 2023-09-11 PROCEDURE — 3051F PR MOST RECENT HEMOGLOBIN A1C LEVEL 7.0 - < 8.0%: ICD-10-PCS | Mod: CPTII,S$GLB,, | Performed by: INTERNAL MEDICINE

## 2023-09-11 PROCEDURE — 99214 PR OFFICE/OUTPT VISIT, EST, LEVL IV, 30-39 MIN: ICD-10-PCS | Mod: S$GLB,,, | Performed by: INTERNAL MEDICINE

## 2023-09-11 PROCEDURE — 1159F PR MEDICATION LIST DOCUMENTED IN MEDICAL RECORD: ICD-10-PCS | Mod: CPTII,S$GLB,, | Performed by: INTERNAL MEDICINE

## 2023-09-11 PROCEDURE — 99999 PR PBB SHADOW E&M-EST. PATIENT-LVL III: CPT | Mod: PBBFAC,,, | Performed by: INTERNAL MEDICINE

## 2023-09-11 PROCEDURE — 3051F HG A1C>EQUAL 7.0%<8.0%: CPT | Mod: CPTII,S$GLB,, | Performed by: INTERNAL MEDICINE

## 2023-09-11 PROCEDURE — 3008F BODY MASS INDEX DOCD: CPT | Mod: CPTII,S$GLB,, | Performed by: INTERNAL MEDICINE

## 2023-09-11 PROCEDURE — 99214 OFFICE O/P EST MOD 30 MIN: CPT | Mod: S$GLB,,, | Performed by: INTERNAL MEDICINE

## 2023-09-11 PROCEDURE — 3079F DIAST BP 80-89 MM HG: CPT | Mod: CPTII,S$GLB,, | Performed by: INTERNAL MEDICINE

## 2023-09-11 PROCEDURE — 3008F PR BODY MASS INDEX (BMI) DOCUMENTED: ICD-10-PCS | Mod: CPTII,S$GLB,, | Performed by: INTERNAL MEDICINE

## 2023-09-11 PROCEDURE — 3075F SYST BP GE 130 - 139MM HG: CPT | Mod: CPTII,S$GLB,, | Performed by: INTERNAL MEDICINE

## 2023-09-11 PROCEDURE — 1159F MED LIST DOCD IN RCRD: CPT | Mod: CPTII,S$GLB,, | Performed by: INTERNAL MEDICINE

## 2023-09-11 PROCEDURE — 3075F PR MOST RECENT SYSTOLIC BLOOD PRESS GE 130-139MM HG: ICD-10-PCS | Mod: CPTII,S$GLB,, | Performed by: INTERNAL MEDICINE

## 2023-09-11 RX ORDER — METFORMIN HYDROCHLORIDE 1000 MG/1
1000 TABLET ORAL 2 TIMES DAILY WITH MEALS
Qty: 180 TABLET | Refills: 1 | Status: SHIPPED | OUTPATIENT
Start: 2023-09-11

## 2023-09-11 NOTE — PROGRESS NOTES
[facility] Note    Chief Complaint      Chief Complaint   Patient presents with    Follow-up     6 mth     History of Present Illness      Yuriy Jerome is a 67 y.o. male who presents today for chief complaint follow up chronic issues .     Follow-up  Pertinent negatives include no chest pain, chills, coughing, fever, nausea or vomiting.     C and L spine pain seeing Cornelio at [facility] spine, had MRI and doing PT. He has been sleeping poorly, may in part be due to pain, cant fall asleep     Annual 3.23  [place]   He had labs spet 2023 t week PSA 4.1 with urology Dr Greene  A1c up more 6.2->6.8->7.1     BPH sees Dr Greene takes proscar and myrbetriq which helps with nocturia, PSA as above, has fillow up net week   NP Samano in sleep med uses autopap. Sleep poor as above, sleep is worse if he doesn't use it at all   HTN on norvasc and losaratn hctz is at goal today, has home cuff, is at goal today   Metformin: predm per, a1c as above he is on metformin   HLD on crestor 20 LD is 65     No hx of asthma, was having wheezing while using rcalled cpap now resolved, allergy shots with Dr Balderas   S/p bialteral THR with Oscar  Had foot surgery had pes planus with dr Pang     PSA: utd   Colonoscopy: q5 years Mark Hoang get records 10.2020  Flu: utd 11.2021  Shingles: not had   PNA: x2 utd   Tobacco: never   Other jose balderas E[facility], J[facility], sleep med,   Active Problem List with Overview Notes    Diagnosis Date Noted    Morbid (severe) obesity due to excess calories 09/11/2023    Subluxation of tarsal joint of left foot 05/19/2022    Hyperlipidemia 12/15/2021    Essential hypertension 12/15/2021    Benign prostatic hyperplasia 12/15/2021    Obesity (BMI 35.0-39.9 without comorbidity) 12/15/2021    Type 2 diabetes mellitus with other specified complication, without long-term current use of insulin     Insomnia 08/24/2018    Obstructive sleep apnea 05/26/2016     Health  Maintenance   Topic Date Due    Foot Exam  Never done    TETANUS VACCINE  Never done    Shingles Vaccine (1 of 2) Never done    Eye Exam  01/27/2024    Hemoglobin A1c  03/08/2024    PROSTATE-SPECIFIC ANTIGEN  09/08/2024    Lipid Panel  09/08/2024    Low Dose Statin  09/11/2024    Colorectal Cancer Screening  12/21/2025    Hepatitis C Screening  Completed       Past Medical History:   Diagnosis Date    Allergy     Diabetes mellitus     prediabetic    Sleep apnea     Staphylococcal infection     left ankle, 2017       Past Surgical History:   Procedure Laterality Date    ADENOIDECTOMY  2001    UPPP surgery    APPENDECTOMY  2005    BICEPS TENDON REPAIR Left 12/28/2021    EYE SURGERY  2017    Cararact surgery    FOOT SURGERY Left 05/2017    subtalar implant, 6 weeks later removed due to failure    FOOT SURGERY Left 05/19/2022    HERNIA REPAIR      DOUBLE age 8    JOINT REPLACEMENT  2009    Bilateral knee replacement    KNEE SURGERY Bilateral 05/2011    TKR, had ligament replacement in right , meniscus repair both knees    LAMINECTOMY  1989    spinal    SPINE SURGERY  1991    Triple laminectomy    TONSILLECTOMY  2001    UPPP surgery    UVULOPALATOPHARYNGOPLASTY  2007    VASECTOMY  1985       family history includes Arthritis in his mother; Asthma in his father; Cancer in his maternal grandfather and maternal grandmother; Drug abuse in his brother, brother, brother, and brother; Heart disease in his father.     Social History     Tobacco Use    Smoking status: Never    Smokeless tobacco: Never   Substance Use Topics    Alcohol use: Yes     Alcohol/week: 11.0 standard drinks of alcohol     Types: 7 Glasses of wine, 4 Cans of beer per week    Drug use: No       Review of Systems   Constitutional:  Negative for chills, fever, malaise/fatigue and weight loss.   Respiratory:  Negative for cough, sputum production, shortness of breath and wheezing.    Cardiovascular:  Negative for chest pain, palpitations, orthopnea and leg  swelling.   Gastrointestinal:  Negative for constipation, diarrhea, nausea and vomiting.   Genitourinary:  Negative for dysuria, frequency, hematuria and urgency.        Outpatient Encounter Medications as of 9/11/2023   Medication Sig Dispense Refill    alpha lipoic acid 600 mg Cap Take by mouth.      amLODIPine (NORVASC) 5 MG tablet Take 1 tablet (5 mg total) by mouth once daily. 90 tablet 1    aspirin (ECOTRIN) 81 MG EC tablet Take 81 mg by mouth once daily.      coQ10, ubiquinol, 200 mg Cap       diltiazem HCl (DILTIAZEM 2% CREAM) Apply topically 3 (three) times daily. Apply topically to anal area. 50 g 5    finasteride (PROSCAR) 5 mg tablet Take 1 tablet (5 mg total) by mouth once daily. 90 tablet 3    fluticasone propionate (FLONASE) 50 mcg/actuation nasal spray fluticasone propionate 50 mcg/actuation nasal spray,suspension      losartan-hydrochlorothiazide 100-12.5 mg (HYZAAR) 100-12.5 mg Tab TAKE 1 TABLET BY MOUTH EVERY DAY 90 tablet 1    mirabegron (MYRBETRIQ) 50 mg Tb24 Take 1 tablet (50 mg total) by mouth once daily. 90 tablet 3    multivit with minerals/lutein (MULTIVITAMIN 50 PLUS ORAL) Take by mouth.      omega-3 fatty acids/fish oil (FISH OIL-OMEGA-3 FATTY ACIDS) 300-1,000 mg capsule Take by mouth once daily.      rosuvastatin (CRESTOR) 20 MG tablet Take 1 tablet (20 mg total) by mouth once daily. 90 tablet 1    UNABLE TO FIND osteobiflex      [DISCONTINUED] metFORMIN (GLUCOPHAGE) 1000 MG tablet Take 1 tablet (1,000 mg total) by mouth Daily. 90 tablet 1    metFORMIN (GLUCOPHAGE) 1000 MG tablet Take 1 tablet (1,000 mg total) by mouth 2 (two) times daily with meals. 180 tablet 1    [DISCONTINUED] losartan-hydrochlorothiazide 100-12.5 mg (HYZAAR) 100-12.5 mg Tab Take 1 tablet by mouth once daily. 90 tablet 1     No facility-administered encounter medications on file as of 9/11/2023.       Review of patient's allergies indicates:   Allergen Reactions    Hay fever and allergy relief Other (See Comments)  "        Physical Exam      Vital Signs  Pulse: 73  SpO2: 96 %  BP: 138/82  BP Location: Right arm  Patient Position: Sitting  Height and Weight  Height: 6' (182.9 cm)  Weight: 120 kg (264 lb 8.8 oz)  BSA (Calculated - sq m): 2.47 sq meters  BMI (Calculated): 35.9  Weight in (lb) to have BMI = 25: 183.9]    Physical Exam  Vitals reviewed.   Constitutional:       Appearance: He is well-developed. He is not diaphoretic.   HENT:      Head: Normocephalic and atraumatic.      Right Ear: External ear normal.      Left Ear: External ear normal.   Eyes:      General: No scleral icterus.        Right eye: No discharge.         Left eye: No discharge.      Conjunctiva/sclera: Conjunctivae normal.   Cardiovascular:      Rate and Rhythm: Normal rate and regular rhythm.      Heart sounds: Normal heart sounds.   Pulmonary:      Effort: Pulmonary effort is normal. No respiratory distress.      Breath sounds: Normal breath sounds. No wheezing or rales.   Musculoskeletal:         General: Normal range of motion.      Cervical back: Normal range of motion.   Neurological:      Mental Status: He is alert and oriented to person, place, and time.   Psychiatric:         Behavior: Behavior normal.         Thought Content: Thought content normal.         Judgment: Judgment normal.          Laboratory:  CBC:  Recent Labs   Lab Result Units 09/08/23  0953   WBC K/uL 6.35   RBC M/uL 4.80   Hemoglobin g/dL 14.9   Hematocrit % 45.3   Platelets K/uL 234   MCV fL 94   MCH pg 31.0   MCHC g/dL 32.9     CMP:  Recent Labs   Lab Result Units 09/08/23  0953   Glucose mg/dL 153*   Calcium mg/dL 9.9   Albumin g/dL 4.3   Total Protein g/dL 6.9   Sodium mmol/L 140   Potassium mmol/L 4.6   CO2 mmol/L 28   Chloride mmol/L 103   BUN mg/dL 16   Alkaline Phosphatase U/L 57   ALT U/L 34   AST U/L 21   Total Bilirubin mg/dL 0.6     URINALYSIS:  No results for input(s): "COLORU", "CLARITYU", "SPECGRAV", "PHUR", "PROTEINUA", "GLUCOSEU", "BILIRUBINCON", "BLOODU", " ""WBCU", "RBCU", "BACTERIA", "MUCUS", "NITRITE", "LEUKOCYTESUR", "UROBILINOGEN", "HYALINECASTS" in the last 2160 hours.   LIPIDS:  Recent Labs   Lab Result Units 09/08/23  0953   HDL mg/dL 37*   Cholesterol mg/dL 137   Triglycerides mg/dL 174*   LDL Cholesterol mg/dL 65.2   HDL/Cholesterol Ratio % 27.0   Non-HDL Cholesterol mg/dL 100   Total Cholesterol/HDL Ratio  3.7     TSH:  No results for input(s): "TSH" in the last 2160 hours.  A1C:  Recent Labs   Lab Result Units 09/08/23  0953   Hemoglobin A1C % 7.1*       Radiology:      Assessment/Plan     Yuriy Dietzholli is a 67 y.o.male with:    1. Type 2 diabetes mellitus with other specified complication, without long-term current use of insulin  Assessment & Plan:  Will increase metformin to bid and if not may need glp1       2. Controlled type 2 diabetes mellitus without complication, without long-term current use of insulin  Assessment & Plan:  Will increase metformin to bid and if not may need glp1     Orders:  -     metFORMIN (GLUCOPHAGE) 1000 MG tablet; Take 1 tablet (1,000 mg total) by mouth 2 (two) times daily with meals.  Dispense: 180 tablet; Refill: 1    3. Morbid (severe) obesity due to excess calories  Assessment & Plan:  Will work on weight loss via dm control       4. Essential hypertension  Assessment & Plan:  At goal on cosme           Use of the [facility] Patient Portal discussed and encouraged during today's visit  -Continue current medications and maintain follow up with specialists.  Return to clinic in 6 mos annual .  No future appointments.    Althea Jerome MD  9/11/2023 1:20 PM    Primary Care Internal Medicine                      "

## 2023-12-02 RX ORDER — ROSUVASTATIN CALCIUM 20 MG/1
20 TABLET, COATED ORAL
Qty: 90 TABLET | Refills: 3 | Status: SHIPPED | OUTPATIENT
Start: 2023-12-02

## 2023-12-02 NOTE — TELEPHONE ENCOUNTER
No care due was identified.  Health [facility] Embedded Care Due Messages. Reference number: [id number].   12/02/2023 5:44:50 AM CST

## 2023-12-03 NOTE — TELEPHONE ENCOUNTER
Refill Decision Note   Yuriy Jerome  is requesting a refill authorization.  Brief Assessment and Rationale for Refill:  Approve     Medication Therapy Plan:         Comments:     Note composed:11:54 PM 12/02/2023

## 2023-12-05 ENCOUNTER — PATIENT MESSAGE (OUTPATIENT)
Dept: PRIMARY CARE CLINIC | Facility: CLINIC | Age: 68
End: 2023-12-05
Payer: MEDICARE

## 2023-12-05 DIAGNOSIS — I10 ESSENTIAL HYPERTENSION: ICD-10-CM

## 2023-12-05 RX ORDER — AMLODIPINE BESYLATE 5 MG/1
5 TABLET ORAL
Qty: 90 TABLET | Refills: 3 | Status: SHIPPED | OUTPATIENT
Start: 2023-12-05

## 2023-12-05 NOTE — TELEPHONE ENCOUNTER
Refill Decision Note   Yuriy Jerome  is requesting a refill authorization.  Brief Assessment and Rationale for Refill:  Approve     Medication Therapy Plan:         Comments:     Note composed:11:04 AM 12/05/2023

## 2023-12-05 NOTE — TELEPHONE ENCOUNTER
No care due was identified.  [facility] Embedded Care Due Messages. Reference number: [id number].   12/05/2023 1:58:25 AM CST

## 2024-01-17 ENCOUNTER — PATIENT MESSAGE (OUTPATIENT)
Dept: PRIMARY CARE CLINIC | Facility: CLINIC | Age: 69
End: 2024-01-17
Payer: MEDICARE

## 2024-01-17 DIAGNOSIS — E11.69 TYPE 2 DIABETES MELLITUS WITH OTHER SPECIFIED COMPLICATION, WITHOUT LONG-TERM CURRENT USE OF INSULIN: Primary | ICD-10-CM

## 2024-01-18 ENCOUNTER — OFFICE VISIT (OUTPATIENT)
Dept: SLEEP MEDICINE | Facility: CLINIC | Age: 69
End: 2024-01-18
Attending: PSYCHIATRY & NEUROLOGY
Payer: MEDICARE

## 2024-01-18 VITALS
WEIGHT: 260.5 LBS | DIASTOLIC BLOOD PRESSURE: 79 MMHG | HEART RATE: 69 BPM | HEIGHT: 73 IN | SYSTOLIC BLOOD PRESSURE: 131 MMHG | BODY MASS INDEX: 34.52 KG/M2

## 2024-01-18 DIAGNOSIS — I10 ESSENTIAL HYPERTENSION: ICD-10-CM

## 2024-01-18 DIAGNOSIS — G47.33 OBSTRUCTIVE SLEEP APNEA: Primary | ICD-10-CM

## 2024-01-18 PROCEDURE — 3078F DIAST BP <80 MM HG: CPT | Mod: CPTII,S$GLB,, | Performed by: NURSE PRACTITIONER

## 2024-01-18 PROCEDURE — 99999 PR PBB SHADOW E&M-EST. PATIENT-LVL II: CPT | Mod: PBBFAC,,, | Performed by: NURSE PRACTITIONER

## 2024-01-18 PROCEDURE — 3288F FALL RISK ASSESSMENT DOCD: CPT | Mod: CPTII,S$GLB,, | Performed by: NURSE PRACTITIONER

## 2024-01-18 PROCEDURE — 1101F PT FALLS ASSESS-DOCD LE1/YR: CPT | Mod: CPTII,S$GLB,, | Performed by: NURSE PRACTITIONER

## 2024-01-18 PROCEDURE — 99214 OFFICE O/P EST MOD 30 MIN: CPT | Mod: S$GLB,,, | Performed by: NURSE PRACTITIONER

## 2024-01-18 PROCEDURE — 1126F AMNT PAIN NOTED NONE PRSNT: CPT | Mod: CPTII,S$GLB,, | Performed by: NURSE PRACTITIONER

## 2024-01-18 PROCEDURE — 3075F SYST BP GE 130 - 139MM HG: CPT | Mod: CPTII,S$GLB,, | Performed by: NURSE PRACTITIONER

## 2024-01-18 PROCEDURE — 3008F BODY MASS INDEX DOCD: CPT | Mod: CPTII,S$GLB,, | Performed by: NURSE PRACTITIONER

## 2024-01-18 NOTE — PROGRESS NOTES
Cc: LACHO, last seen 12/2022     Using apap 12-16cm qhs, can't sleep w/o it.  Using heated hose. Continued improvement of symptoms overall but having mouth opening/disruptive noises to himself and wife despite chin strap. Tried FFM in past and mask fit problematic. Wondering about Inspire again. Gained wgt since last seen. Has 2 replacement machines  BP stable     Interrogation DS1: AHI 1.8, 30davg 7.7hr/night. mask fit 67-71%. 0% PB    BASELINE SLEEP STUDY 2004 RDI 76.8; O2 lizett 86%, titrated to 12cm. Wt 224.6 lbs     Hx  UPPP, septoplasty, sinus surgery--no requal study after surgery        IMPRESSION:   1. LACHO.  Continued adherence, AHI<5, benefits from therapy, mouth opening/noises bothersome  2. Insomnia     PLAN:  1.Continue Auto CPAP 12-16, switch back to FFM but hybrid style/meet with RT , [facility] DME  2 Discussed effectiveness of therapy  3. See pcp re HTN mgt/continue meds  Discussed Inspire criteria, defers requal psg at this time

## 2024-01-31 ENCOUNTER — PATIENT MESSAGE (OUTPATIENT)
Dept: PRIMARY CARE CLINIC | Facility: CLINIC | Age: 69
End: 2024-01-31
Payer: MEDICARE

## 2024-02-22 ENCOUNTER — PATIENT MESSAGE (OUTPATIENT)
Dept: SLEEP MEDICINE | Facility: CLINIC | Age: 69
End: 2024-02-22
Payer: MEDICARE

## 2024-02-22 DIAGNOSIS — G47.33 OSA (OBSTRUCTIVE SLEEP APNEA): Primary | ICD-10-CM

## 2024-02-23 DIAGNOSIS — N40.0 ENLARGED PROSTATE: Primary | ICD-10-CM

## 2024-02-26 ENCOUNTER — PATIENT MESSAGE (OUTPATIENT)
Dept: ADMINISTRATIVE | Facility: HOSPITAL | Age: 69
End: 2024-02-26
Payer: MEDICARE

## 2024-02-27 ENCOUNTER — LAB VISIT (OUTPATIENT)
Dept: LAB | Facility: HOSPITAL | Age: 69
End: 2024-02-27
Attending: INTERNAL MEDICINE
Payer: MEDICARE

## 2024-02-27 DIAGNOSIS — N40.0 ENLARGED PROSTATE: ICD-10-CM

## 2024-02-27 LAB
PROSTATE SPECIFIC ANTIGEN, TOTAL: 6.1 NG/ML (ref 0–4)
PSA FREE MFR SERPL: 25.74 %
PSA FREE SERPL-MCNC: 1.57 NG/ML (ref 0–1.5)

## 2024-02-27 PROCEDURE — 84154 ASSAY OF PSA FREE: CPT | Performed by: UROLOGY

## 2024-02-27 PROCEDURE — 36415 COLL VENOUS BLD VENIPUNCTURE: CPT | Performed by: UROLOGY

## 2024-02-29 NOTE — PROGRESS NOTES
Subjective:      Yuriy Jerome is a 68 y.o. male who returns today regarding his     Proscar and flomax  Nocturia *2  Stream is OK    +urgency  On myrbetriq        The following portions of the patient's history were reviewed and updated as appropriate: allergies, current medications, past family history, past medical history, past social history, past surgical history and problem list.    Review of Systems  Pertinent items are noted in HPI.  A comprehensive multipoint review of systems was negative except as otherwise stated in the HPI.    Past Medical History:   Diagnosis Date    Allergy     Diabetes mellitus     prediabetic    Sleep apnea     Staphylococcal infection     left ankle, 2017     Past Surgical History:   Procedure Laterality Date    ADENOIDECTOMY  2001    UPPP surgery    APPENDECTOMY  2005    BICEPS TENDON REPAIR Left 12/28/2021    EYE SURGERY  2017    Cararact surgery    FOOT SURGERY Left 05/2017    subtalar implant, 6 weeks later removed due to failure    FOOT SURGERY Left 05/19/2022    HERNIA REPAIR      DOUBLE age 8    JOINT REPLACEMENT  2009    Bilateral knee replacement    KNEE SURGERY Bilateral 05/2011    TKR, had ligament replacement in right , meniscus repair both knees    LAMINECTOMY  1989    spinal    SPINE SURGERY  1991    Triple laminectomy    TONSILLECTOMY  2001    UPPP surgery    UVULOPALATOPHARYNGOPLASTY  2007    VASECTOMY  1985     Diabetes Medications               metFORMIN (GLUCOPHAGE) 1000 MG tablet Take 1 tablet (1,000 mg total) by mouth 2 (two) times daily with meals.          Review of patient's allergies indicates:   Allergen Reactions    Hay fever and allergy relief Other (See Comments)          Objective:   Vitals: There were no vitals taken for this visit.    Physical Exam   General: alert and oriented, no acute distress  Respiratory: Symmetric expansion, non-labored breathing  Cardiovascular: no peripheral edema  Abdomen: soft, non distended  Skin: normal coloration and  turgor, no rashes, no suspicious skin lesions noted  Neuro: no gross deficits  Psych: normal judgment and insight, normal mood/affect, and non-anxious  CHELLY unchanged nodule on right side of prostate; unchanged for many years; previous neg biopsy    Physical Exam    Lab Review   Urinalysis demonstrates negative for all components  Lab Results   Component Value Date    WBC 6.35 09/08/2023    HGB 14.9 09/08/2023    HCT 45.3 09/08/2023    MCV 94 09/08/2023     09/08/2023     Lab Results   Component Value Date    CREATININE 1.0 09/08/2023    BUN 16 09/08/2023     Lab Results   Component Value Date    PSA 7.4 (H) 10/09/2009    PSA 3.0 11/11/2005    PSA 1.7 01/09/2004    PSADIAG 4.1 (H) 09/08/2023    PSADIAG 4.1 (H) 02/27/2023    PSADIAG 4.6 (H) 03/07/2022    PSATOTAL 6.1 (H) 02/27/2024 proscar PSA density 0.12 normal    PSAFREE 1.57 (H) 02/27/2024    PSAFREEPCT 25.74 02/27/2024         Imaging  -  MRI PROSTATE W W/O CONTRAST     CLINICAL HISTORY:  psa 4.6; nodule on right was hemangioma on previous outside imaging;  Benign prostatic hyperplasia without lower urinary tract symptoms     TECHNIQUE:  Multiparametric MRI of the prostate/pelvis performed on a 3T scanner with phase pelvic coil. Multiplanar, multisequence images including high resolution, small field-of-view T2-WI; axial diffusion weighted images with multiple B-values and creation of ADC-maps; and dynamic contrast enhanced T1-weighted images through the prostate were obtained before, during, and after the administration of 10 cc intravenous gadolinium.     COMPARISON:  None     FINDINGS:  Previous biopsy: None     PSA: 4.6 ng/mL 03/07/2022     Prior therapy: None     Prostate: 6.4 x 4.6 x 6.8 cm corresponding to a computed volume of 108.67 cc.     Peripheral zone: No focal abnormalities with imaging features concerning for prostate cancer, score 1.     Transitional zone: Benign prostatic hyperplasia without focal suspicious abnormality, score 2.      Lesion (THAI) #T-1     Location: Side:Left; Region: Base and mid; Zone: posterior     Greatest dimension: 1.7 cm     T2-WI: Heterogeneous signal intensity withobscured margins, score 3.     DWI/ADC: Focal (discrete and different from the background) hypointense on ADC and/or focal hyperintense on high b-value DWI; may be markedly hypointense on ADC or markedly hyperintense on high b-value DWI, but not both, score 3.     DCE: Positive     Extraprostatic extension: Negative     PI-RADS assessment category: 3     Neurovascular bundle: Normal appearance.     Seminal vesicles: Normal appearance.     Adjacent Organ Involvement: No evidence for urinary bladder or rectal invasion.     Lymphadenopathy: None.     Other Findings: None.     Impression:     Benign prostatic hyperplasia with indeterminate lesion in the left mid and base posterior transitional zone.     Overall Assessment: PI-RADS 3 - Intermediate (the presence of clinically significant cancer is equivocal)     Number of targets created for potential MR/US fusion biopsy     Peripheral zone: 0     Transition zone: 1     Electronically signed by resident: Alisa Frias  Date:                                            04/18/2022  Time:                                           16:13     Electronically signed by: Anshul Sarmiento MD  Date:                                            04/19/2022  Time:                                           08:56    Assessment and Plan:   BPH 109g     Urgency incontinence     OAB (overactive bladder)         discussed outlet procedures  See Dr Newell for UDS then return to see me to discuss outlet procedure options     Cont proscar and flomax and myrbetriq 50mg  RTC 1 yr with psa for CEHLLY

## 2024-03-01 ENCOUNTER — PATIENT MESSAGE (OUTPATIENT)
Dept: UROLOGY | Facility: CLINIC | Age: 69
End: 2024-03-01

## 2024-03-01 ENCOUNTER — TELEPHONE (OUTPATIENT)
Dept: UROLOGY | Facility: CLINIC | Age: 69
End: 2024-03-01
Payer: MEDICARE

## 2024-03-01 ENCOUNTER — OFFICE VISIT (OUTPATIENT)
Dept: UROLOGY | Facility: CLINIC | Age: 69
End: 2024-03-01
Payer: MEDICARE

## 2024-03-01 VITALS
OXYGEN SATURATION: 100 % | DIASTOLIC BLOOD PRESSURE: 81 MMHG | SYSTOLIC BLOOD PRESSURE: 132 MMHG | HEIGHT: 73 IN | HEART RATE: 66 BPM | WEIGHT: 257.94 LBS | BODY MASS INDEX: 34.19 KG/M2 | RESPIRATION RATE: 12 BRPM

## 2024-03-01 DIAGNOSIS — N40.0 ENLARGED PROSTATE: Primary | ICD-10-CM

## 2024-03-01 DIAGNOSIS — N32.81 OAB (OVERACTIVE BLADDER): ICD-10-CM

## 2024-03-01 PROCEDURE — 3079F DIAST BP 80-89 MM HG: CPT | Mod: CPTII,S$GLB,, | Performed by: UROLOGY

## 2024-03-01 PROCEDURE — 3008F BODY MASS INDEX DOCD: CPT | Mod: CPTII,S$GLB,, | Performed by: UROLOGY

## 2024-03-01 PROCEDURE — 1101F PT FALLS ASSESS-DOCD LE1/YR: CPT | Mod: CPTII,S$GLB,, | Performed by: UROLOGY

## 2024-03-01 PROCEDURE — 1159F MED LIST DOCD IN RCRD: CPT | Mod: CPTII,S$GLB,, | Performed by: UROLOGY

## 2024-03-01 PROCEDURE — 99214 OFFICE O/P EST MOD 30 MIN: CPT | Mod: S$GLB,,, | Performed by: UROLOGY

## 2024-03-01 PROCEDURE — 3288F FALL RISK ASSESSMENT DOCD: CPT | Mod: CPTII,S$GLB,, | Performed by: UROLOGY

## 2024-03-01 PROCEDURE — 3075F SYST BP GE 130 - 139MM HG: CPT | Mod: CPTII,S$GLB,, | Performed by: UROLOGY

## 2024-03-01 PROCEDURE — 1126F AMNT PAIN NOTED NONE PRSNT: CPT | Mod: CPTII,S$GLB,, | Performed by: UROLOGY

## 2024-03-01 NOTE — TELEPHONE ENCOUNTER
Care Due:                  Date            Visit Type   Department     Provider  --------------------------------------------------------------------------------                                EP -                              PRIMARY      [facility] PRIMARY  Last Visit: 09-      CARE ([facility])   ROBERT Jerome                              EP -                              PRIMARY      [facility] PRIMARY  Next Visit: 04-      CARE ([facility])   ROBERT Jreome                                                            Last  Test          Frequency    Reason                     Performed    Due Date  --------------------------------------------------------------------------------    HBA1C.......  6 months...  metFORMIN................  09- 03-    [facility] Embedded Care Due Messages. Reference number: [id number].   3/01/2024 5:40:27 AM CST

## 2024-03-01 NOTE — TELEPHONE ENCOUNTER
LVM to schedule pt for UDS procedure    ----- Message from Joyce Shannon RN sent at 3/1/2024 11:54 AM CST -----  It's in now. Thanks!  ----- Message -----  From: Kaylyn Parmar MA  Sent: 3/1/2024  11:51 AM CST  To: Sondra Little MA; #    There's no order currently placed    ----- Message -----  From: Joyce Spivey RN  Sent: 3/1/2024  11:44 AM CST  To: Kaylyn Parmar MA; Sondra Little MA    We're doing procedures at [place], can one of ya'll schedule this please?  Thanks!     ----- Message -----  From: Sondra Little MA  Sent: 3/1/2024  10:14 AM CST  To: Niurka Garcia RN; #      ----- Message -----  From: Karl Greene MD  Sent: 3/1/2024   9:09 AM CST  To: Tono Newell MD; [facility] Staff; #    Dr Newell, can you schedule Mr Jerome for UDS please?    Scientology team, please schedule for him to see me after the UDS please.

## 2024-03-01 NOTE — Clinical Note
Dr Newell, can you schedule Mr Jerome for UDS please?  Congregation team, please schedule for him to see me after the UDS please.

## 2024-03-02 RX ORDER — LOSARTAN POTASSIUM AND HYDROCHLOROTHIAZIDE 12.5; 1 MG/1; MG/1
1 TABLET ORAL
Qty: 90 TABLET | Refills: 1 | Status: SHIPPED | OUTPATIENT
Start: 2024-03-02

## 2024-03-02 NOTE — TELEPHONE ENCOUNTER
Refill Decision Note   Yuriy Jerome  is requesting a refill authorization.  Brief Assessment and Rationale for Refill:  Approve     Medication Therapy Plan:  FOVS; FLOS      Comments:     Note composed:5:19 PM 03/02/2024

## 2024-03-06 ENCOUNTER — TELEPHONE (OUTPATIENT)
Dept: UROLOGY | Facility: CLINIC | Age: 69
End: 2024-03-06
Payer: MEDICARE

## 2024-04-01 ENCOUNTER — LAB VISIT (OUTPATIENT)
Dept: LAB | Facility: HOSPITAL | Age: 69
End: 2024-04-01
Attending: INTERNAL MEDICINE
Payer: MEDICARE

## 2024-04-01 DIAGNOSIS — E11.69 TYPE 2 DIABETES MELLITUS WITH OTHER SPECIFIED COMPLICATION, WITHOUT LONG-TERM CURRENT USE OF INSULIN: ICD-10-CM

## 2024-04-01 LAB
ESTIMATED AVG GLUCOSE: 148 MG/DL (ref 68–131)
HBA1C MFR BLD: 6.8 % (ref 4–5.6)

## 2024-04-01 PROCEDURE — 83036 HEMOGLOBIN GLYCOSYLATED A1C: CPT | Performed by: INTERNAL MEDICINE

## 2024-04-01 PROCEDURE — 36415 COLL VENOUS BLD VENIPUNCTURE: CPT | Performed by: INTERNAL MEDICINE

## 2024-04-02 ENCOUNTER — PROCEDURE VISIT (OUTPATIENT)
Dept: UROLOGY | Facility: CLINIC | Age: 69
End: 2024-04-02
Payer: MEDICARE

## 2024-04-02 VITALS
DIASTOLIC BLOOD PRESSURE: 81 MMHG | TEMPERATURE: 98 F | SYSTOLIC BLOOD PRESSURE: 133 MMHG | BODY MASS INDEX: 34.52 KG/M2 | WEIGHT: 254.88 LBS | HEART RATE: 69 BPM | HEIGHT: 72 IN | RESPIRATION RATE: 16 BRPM

## 2024-04-02 DIAGNOSIS — N40.0 ENLARGED PROSTATE: ICD-10-CM

## 2024-04-02 PROCEDURE — 51728 CYSTOMETROGRAM W/VP: CPT | Mod: 26,S$GLB,, | Performed by: UROLOGY

## 2024-04-02 PROCEDURE — 51741 ELECTRO-UROFLOWMETRY FIRST: CPT | Mod: 26,51,S$GLB, | Performed by: UROLOGY

## 2024-04-02 PROCEDURE — 51784 ANAL/URINARY MUSCLE STUDY: CPT | Mod: 26,51,S$GLB, | Performed by: UROLOGY

## 2024-04-02 PROCEDURE — 51797 INTRAABDOMINAL PRESSURE TEST: CPT | Mod: 26,S$GLB,, | Performed by: UROLOGY

## 2024-04-02 NOTE — PROCEDURES
Urodynamic Report    [facility]       Referring Physician:  Tono Newell MD    YOB: 1955  Date of Exam: 4/2/2024    HPI: Yuryi Jerome is a 68 y.o. male who returns today regarding his      Proscar and flomax  Nocturia *2  Stream is OK     +urgency  On myrbetriq     Prostate: 6.4 x 4.6 x 6.8 cm corresponding to a computed volume of 108.67 cc.     Cystometrogram:    Position: Sitting  Filling Rate: 30 mL/sec   Catheter: 7F  Fluid:Water       Cath PVR prior: 47 mL Voiding Diary Capacity: Not Available   First Sensation: 6 mL First Desire: 17 mL   Strong Desire: 68 mL Cystometric Capacity: 69 mL       Pdet at [facility]: 0 cm H2O Compliance: Normal       Detrusor Overactivity (DO): Absent  Volume first DO: No DO   Max DO Pressure: No DO Urgency Incontinence: Absent        Leak Point Pressure Testing:        Volume Tested: 70 mL  Abdominal Leak Point Pressure: No Leak   Stress Induced DO: Absent          Voiding Study:        Voided Volume: 50 mL PVR: 70 mL   Maximum Flow: 5 mL/sec Average Flow 2 mL/sec   Max Pdet: 107 cmH2O  Pdet at Max Flow: 88 cmH2O   EMG Storage: Normal Recruitment  EMG Voiding: Normal quiescence       Impression:        Sensation:Early    Capacity: Small    Compliance:Normal    Detrusor Overactivity:Absent    Continence: No Incontinence    Contractility: Bladder Outlet Obstruction    Emptying:Unsatisfactory - Bladder Outlet Obstruction    Coordination:Coordinated Voiding          Summary:  This study was performed in accordance with the current AUA/SUFU Guideline on Adult Urodynamics. On filling phase he demonstrates early first and strong desire with a small bladder capacity. There is no detrusor overactivity (DO) demonstrated on this exam (though absence of DO on urodynamic evaluation does not exclude it as causative agent of urgency symptoms). Bladder compliance at capacity is normal.     On stress testing, with adequate cough and valsalva effort, there is  no PRECIOUS demonstrated and patient reports no clinical history of PRECIOUS.    On voiding phase,  voiding pressures are elevated, particularly with respect to the attenuated flow. His bladder outlet obstruction index (BOOI=78) is clearly indicative of obstruction with adequate detrusor contractility (BCI = 113 . He empties incompletely. The voiding study was performed twice with very similar findings on both studies.

## 2024-04-02 NOTE — PATIENT INSTRUCTIONS
SIMPLE URODYNAMIC STUDY (SUDS) DISCHARGE INSTRUCTIONS    You have had a procedure that will require time to properly heal. Follow the instructions you have been given on how to care for yourself once you are home. Below is additional information to help in your recovery.    ACTIVITY  There are no restrictions in activity. Start doing again the things you did before the procedure.  You may experience a slight burning sensation. You may notice a small amount of blood in your urine. This will clear up within a day. Call the clinic if this continues beyond 48 hours.     DIET  Continue your normal diet. You may eat the same foods you ate before your procedure.  Drink plenty of fluids during the first 24-48 hours following your procedure.     MEDICATIONS  Resume all other previous medications from your prescribing physician.  Continue any pre-procedure antibiotics until they are all gone.     SIGNS AND SYMPTOMS TO REPORT TO THE DOCTOR  Chills or fever greater than 101° F within 24 hours of procedure.  Changes in urination, such as increased bleeding, foul smell, cloudy urine, or painful urination.  Call your doctor with any questions or concerns.     For any emergency situation, call 911 immediately or go to your nearest emergency room.     [facility]  [phone]

## 2024-04-03 DIAGNOSIS — E11.9 TYPE 2 DIABETES MELLITUS WITHOUT COMPLICATION, UNSPECIFIED WHETHER LONG TERM INSULIN USE: ICD-10-CM

## 2024-04-03 DIAGNOSIS — E11.9 TYPE 2 DIABETES MELLITUS WITHOUT COMPLICATION: ICD-10-CM

## 2024-04-08 ENCOUNTER — PATIENT MESSAGE (OUTPATIENT)
Dept: PRIMARY CARE CLINIC | Facility: CLINIC | Age: 69
End: 2024-04-08

## 2024-04-08 ENCOUNTER — OFFICE VISIT (OUTPATIENT)
Dept: PRIMARY CARE CLINIC | Facility: CLINIC | Age: 69
End: 2024-04-08
Payer: MEDICARE

## 2024-04-08 ENCOUNTER — PATIENT MESSAGE (OUTPATIENT)
Dept: ADMINISTRATIVE | Facility: HOSPITAL | Age: 69
End: 2024-04-08
Payer: MEDICARE

## 2024-04-08 ENCOUNTER — LAB VISIT (OUTPATIENT)
Dept: LAB | Facility: HOSPITAL | Age: 69
End: 2024-04-08
Attending: INTERNAL MEDICINE
Payer: MEDICARE

## 2024-04-08 VITALS
SYSTOLIC BLOOD PRESSURE: 138 MMHG | HEIGHT: 72 IN | BODY MASS INDEX: 35.23 KG/M2 | WEIGHT: 260.13 LBS | DIASTOLIC BLOOD PRESSURE: 80 MMHG | HEART RATE: 73 BPM | OXYGEN SATURATION: 95 %

## 2024-04-08 DIAGNOSIS — E66.01 MORBID (SEVERE) OBESITY DUE TO EXCESS CALORIES: ICD-10-CM

## 2024-04-08 DIAGNOSIS — R73.03 PREDIABETES: ICD-10-CM

## 2024-04-08 DIAGNOSIS — I10 ESSENTIAL HYPERTENSION: ICD-10-CM

## 2024-04-08 DIAGNOSIS — R22.1 NECK MASS: ICD-10-CM

## 2024-04-08 DIAGNOSIS — J02.9 PHARYNGITIS, UNSPECIFIED ETIOLOGY: ICD-10-CM

## 2024-04-08 DIAGNOSIS — Z00.00 WELLNESS EXAMINATION: Primary | ICD-10-CM

## 2024-04-08 DIAGNOSIS — E11.69 TYPE 2 DIABETES MELLITUS WITH OTHER SPECIFIED COMPLICATION, WITHOUT LONG-TERM CURRENT USE OF INSULIN: ICD-10-CM

## 2024-04-08 LAB
T4 FREE SERPL-MCNC: 0.87 NG/DL (ref 0.71–1.51)
THYROPEROXIDASE IGG SERPL-ACNC: <6 IU/ML
TSH SERPL DL<=0.005 MIU/L-ACNC: 1.12 UIU/ML (ref 0.4–4)

## 2024-04-08 PROCEDURE — 99397 PER PM REEVAL EST PAT 65+ YR: CPT | Mod: S$GLB,,, | Performed by: INTERNAL MEDICINE

## 2024-04-08 PROCEDURE — 36415 COLL VENOUS BLD VENIPUNCTURE: CPT | Performed by: INTERNAL MEDICINE

## 2024-04-08 PROCEDURE — 84439 ASSAY OF FREE THYROXINE: CPT | Performed by: INTERNAL MEDICINE

## 2024-04-08 PROCEDURE — 86376 MICROSOMAL ANTIBODY EACH: CPT | Performed by: INTERNAL MEDICINE

## 2024-04-08 PROCEDURE — 3079F DIAST BP 80-89 MM HG: CPT | Mod: CPTII,S$GLB,, | Performed by: INTERNAL MEDICINE

## 2024-04-08 PROCEDURE — 84443 ASSAY THYROID STIM HORMONE: CPT | Performed by: INTERNAL MEDICINE

## 2024-04-08 PROCEDURE — 1159F MED LIST DOCD IN RCRD: CPT | Mod: CPTII,S$GLB,, | Performed by: INTERNAL MEDICINE

## 2024-04-08 PROCEDURE — 99214 OFFICE O/P EST MOD 30 MIN: CPT | Mod: 25,S$GLB,, | Performed by: INTERNAL MEDICINE

## 2024-04-08 PROCEDURE — 3075F SYST BP GE 130 - 139MM HG: CPT | Mod: CPTII,S$GLB,, | Performed by: INTERNAL MEDICINE

## 2024-04-08 PROCEDURE — 3044F HG A1C LEVEL LT 7.0%: CPT | Mod: CPTII,S$GLB,, | Performed by: INTERNAL MEDICINE

## 2024-04-08 PROCEDURE — 3008F BODY MASS INDEX DOCD: CPT | Mod: CPTII,S$GLB,, | Performed by: INTERNAL MEDICINE

## 2024-04-08 PROCEDURE — 99999 PR PBB SHADOW E&M-EST. PATIENT-LVL IV: CPT | Mod: PBBFAC,,, | Performed by: INTERNAL MEDICINE

## 2024-04-08 RX ORDER — AMOXICILLIN 500 MG/1
500 TABLET, FILM COATED ORAL EVERY 12 HOURS
Qty: 20 TABLET | Refills: 0 | Status: SHIPPED | OUTPATIENT
Start: 2024-04-08 | End: 2024-04-18

## 2024-04-08 NOTE — PROGRESS NOTES
[facility] Note    Chief Complaint      Chief Complaint   Patient presents with    Annual Exam     History of Present Illness      Yuriy Jerome is a 68 y.o. 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 Remote hx of a thyroid nodule that was eval by [place] 5-10 years ago had u/s and bx per pt. He has some tENDER lad TODAY   He is having sore throat      BPH with LUTS sees Dr Greene takes proscar and myrbetriq which helps with nocturia, PSA 2.2024 was 6.1 . He had urodynamics last week. He had an MRI prostate in 2022 and previous bx. He has significant outlet obstruction, discussing outlet procedure options     NP Samano in sleep med uses autopap. Sleep poor, sleep is worse if he doesn't use it at all   HTN on norvasc and losaratn hctz is above goal today, has home cuff,  Metformin: A1c now in DM territory last was 6.2->6.8->7.1->6.8 1 week ago   HLD on crestor 20 LD is 65  No hx of asthma, was having wheezing while using rcalled cpap now resolved, allergy shots with Dr Sherrie Cabrera/adan  C and L spine pain seeing [facility], had MRI and doing PT. He has been sleeping poorly, may in part be due to pain, cant fall asleep. 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, He also has sciatica   He had labs spet 2023 t week PSA 4.1 with urology Dr Greene  A1c up more   S/p bialteral THR with Oscar  Had foot surgery had pes planus with dr Pang     PSA: utd   Colonoscopy: q5 years Mark Hoang get records 10.2020  Flu: utd 11.2021  Shingles: not had   PNA: x2 utd   Tobacco: never   Other [facility], Jc, sleep med,   This note documents patient's acute complaint of neck mass, sore throat addressed within their  primary visit for annual preventative visit.    HPI: New Problem today. Pt complains of neck mass, sore throat.    ROS: ear pain, sinus pressure, negative for hoarseness or compressive sympoms     Physical Exam: bilat mid ear effusion, R sided thyromegaly, post op erythema, cervical LAD on the left side     A/P: tx as presumptive strep given known exposure and ct head to eval mass     Active Problem List with Overview Notes    Diagnosis Date Noted    Morbid (severe) obesity due to excess calories 09/11/2023    Subluxation of tarsal joint of left foot 05/19/2022    Hyperlipidemia 12/15/2021    Essential hypertension 12/15/2021    Benign prostatic hyperplasia 12/15/2021    Obesity (BMI 35.0-39.9 without comorbidity) 12/15/2021    Type 2 diabetes mellitus with other specified complication, without long-term current use of insulin     Insomnia 08/24/2018    Obstructive sleep apnea 05/26/2016     Health Maintenance   Topic Date Due    Foot Exam  Never done    TETANUS VACCINE  Never done    Eye Exam  01/27/2024    Lipid Panel  09/08/2024    Hemoglobin A1c  10/01/2024    PROSTATE-SPECIFIC ANTIGEN  02/27/2025    Low Dose Statin  04/08/2025    Colorectal Cancer Screening  12/21/2025    Hepatitis C Screening  Completed    Shingles Vaccine  Completed       Past Medical History:   Diagnosis Date    Allergy     Diabetes mellitus     prediabetic    Sleep apnea     Staphylococcal infection     left ankle, 2017       Past Surgical History:   Procedure Laterality Date    ADENOIDECTOMY  2001    UPPP surgery    APPENDECTOMY  2005    BICEPS TENDON REPAIR Left 12/28/2021    EYE SURGERY  2017    Cararact surgery    FOOT SURGERY Left 05/2017    subtalar implant, 6 weeks later removed due to failure    FOOT SURGERY Left 05/19/2022    HERNIA REPAIR      DOUBLE age 8    JOINT REPLACEMENT  2009    Bilateral knee replacement    KNEE SURGERY Bilateral 05/2011    TKR, had ligament replacement in right , meniscus repair both knees    LAMINECTOMY   1989    spinal    SPINE SURGERY  1991    Triple laminectomy    TONSILLECTOMY  2001    UPPP surgery    UVULOPALATOPHARYNGOPLASTY  2007    VASECTOMY  1985       family history includes Arthritis in his mother; Asthma in his father; Cancer in his maternal grandfather and maternal grandmother; Drug abuse in his brother, brother, brother, and brother; Heart disease in his father.     Social History     Tobacco Use    Smoking status: Never    Smokeless tobacco: Never   Substance Use Topics    Alcohol use: Yes     Alcohol/week: 11.0 standard drinks of alcohol     Types: 7 Glasses of wine, 4 Cans of beer per week    Drug use: No       Review of Systems   Constitutional:  Negative for chills, fever, malaise/fatigue and weight loss.   HENT:  Positive for ear pain, sinus pain and sore throat.    Respiratory:  Negative for cough, sputum production, shortness of breath and wheezing.    Cardiovascular:  Negative for chest pain, palpitations, orthopnea and leg swelling.   Gastrointestinal:  Negative for constipation, diarrhea, nausea and vomiting.   Genitourinary:  Negative for dysuria, frequency, hematuria and urgency.        Outpatient Encounter Medications as of 4/8/2024   Medication Sig Dispense Refill    alpha lipoic acid 600 mg Cap Take by mouth.      amLODIPine (NORVASC) 5 MG tablet TAKE 1 TABLET(5 MG) BY MOUTH EVERY DAY 90 tablet 3    aspirin (ECOTRIN) 81 MG EC tablet Take 81 mg by mouth once daily.      coQ10, ubiquinol, 200 mg Cap       finasteride (PROSCAR) 5 mg tablet Take 1 tablet (5 mg total) by mouth once daily. 90 tablet 3    fluticasone propionate (FLONASE) 50 mcg/actuation nasal spray fluticasone propionate 50 mcg/actuation nasal spray,suspension      losartan-hydrochlorothiazide 100-12.5 mg (HYZAAR) 100-12.5 mg Tab TAKE 1 TABLET BY MOUTH EVERY DAY 90 tablet 1    metFORMIN (GLUCOPHAGE) 1000 MG tablet Take 1 tablet (1,000 mg total) by mouth 2 (two) times daily with meals. 180 tablet 1    mirabegron (MYRBETRIQ) 50  mg Tb24 Take 1 tablet (50 mg total) by mouth once daily. 90 tablet 3    multivit with minerals/lutein (MULTIVITAMIN 50 PLUS ORAL) Take by mouth.      omega-3 fatty acids/fish oil (FISH OIL-OMEGA-3 FATTY ACIDS) 300-1,000 mg capsule Take by mouth once daily.      rosuvastatin (CRESTOR) 20 MG tablet TAKE 1 TABLET(20 MG) BY MOUTH EVERY DAY 90 tablet 3    UNABLE TO FIND osteobiflex      amoxicillin (AMOXIL) 500 MG Tab Take 1 tablet (500 mg total) by mouth every 12 (twelve) hours. for 10 days 20 tablet 0     No facility-administered encounter medications on file as of 4/8/2024.       Review of patient's allergies indicates:   Allergen Reactions    Hay fever and allergy relief Other (See Comments)         Physical Exam      Vital Signs  Pulse: 73  SpO2: 95 %  BP: 138/80  BP Location: Left arm  Patient Position: Sitting  Height and Weight  Height: 6' (182.9 cm)  Weight: 118 kg (260 lb 2.3 oz)  BSA (Calculated - sq m): 2.45 sq meters  BMI (Calculated): 35.3  Weight in (lb) to have BMI = 25: 183.9]    Physical Exam  Vitals reviewed.   Constitutional:       Appearance: He is well-developed. He is not diaphoretic.   HENT:      Head: Normocephalic and atraumatic.      Right Ear: External ear normal. Tympanic membrane is erythematous and bulging.      Left Ear: External ear normal. Tympanic membrane is erythematous and bulging.      Mouth/Throat:      Pharynx: Posterior oropharyngeal erythema present.   Eyes:      General: No scleral icterus.        Right eye: No discharge.         Left eye: No discharge.      Conjunctiva/sclera: Conjunctivae normal.   Neck:      Thyroid: Thyromegaly present. No thyroid tenderness.   Cardiovascular:      Rate and Rhythm: Normal rate and regular rhythm.      Heart sounds: Normal heart sounds.   Pulmonary:      Effort: Pulmonary effort is normal. No respiratory distress.      Breath sounds: Normal breath sounds.   Musculoskeletal:         General: Normal range of motion.      Cervical back: Normal  range of motion. Pain with movement present.   Lymphadenopathy:      Cervical: Cervical adenopathy present.      Right cervical: Posterior cervical adenopathy present.      Left cervical: Posterior cervical adenopathy present.   Neurological:      Mental Status: He is alert and oriented to person, place, and time.   Psychiatric:         Behavior: Behavior normal.         Thought Content: Thought content normal.         Judgment: Judgment normal.            A1C:  Recent Labs   Lab Result Units 04/01/24  1231   Hemoglobin A1C % 6.8*       Assessment/Plan     Yuriy Jerome is a 68 y.o.male with:    1. Wellness examination    2. Type 2 diabetes mellitus with other specified complication, without long-term current use of insulin  -     Comprehensive Metabolic Panel; Future; Expected date: 04/08/2024  -     Lipid Panel; Future; Expected date: 04/08/2024  -     Microalbumin/Creatinine Ratio, Urine; Future  -     Hemoglobin A1C; Future; Expected date: 04/08/2024  -     CBC Without Differential; Future; Expected date: 04/08/2024    3. Neck mass  -     CT Soft Tissue Neck With Contrast; Future; Expected date: 04/08/2024  -     TSH; Future; Expected date: 04/08/2024  -     T4, FREE; Future; Expected date: 04/08/2024  -     THYROID PEROXIDASE ANTIBODY; Future; Expected date: 04/08/2024    4. Prediabetes  -     TSH; Future; Expected date: 04/08/2024  -     T4, FREE; Future; Expected date: 04/08/2024    5. Morbid (severe) obesity due to excess calories  Assessment & Plan:  Discussed diet and exercise       6. Pharyngitis, unspecified etiology  Treat as persumptove strep   -     amoxicillin (AMOXIL) 500 MG Tab; Take 1 tablet (500 mg total) by mouth every 12 (twelve) hours. for 10 days  Dispense: 20 tablet; Refill: 0    7. Essential hypertension  Assessment & Plan:  At goal on recheck           Use of the [facility] Patient Portal discussed and encouraged during today's visit  -Continue current medications and maintain follow up  with specialists.  Return to clinic in 6 months .  Future Appointments   Date Time Provider Department Center   4/12/2024  8:15 AM Karl Greene MD [facility]   10/8/2024  8:00 AM [facility]   10/10/2024  2:20 PM Althea Jerome MD [facility] José Jerome MD  4/10/2024 2:58 PM    Primary Care Internal Medicine

## 2024-04-09 ENCOUNTER — HOSPITAL ENCOUNTER (OUTPATIENT)
Dept: RADIOLOGY | Facility: HOSPITAL | Age: 69
Discharge: HOME | End: 2024-04-09
Attending: INTERNAL MEDICINE
Payer: MEDICARE

## 2024-04-09 DIAGNOSIS — R22.1 NECK MASS: ICD-10-CM

## 2024-04-09 LAB
CREAT SERPL-MCNC: 0.9 MG/DL (ref 0.5–1.4)
SAMPLE: NORMAL

## 2024-04-09 PROCEDURE — 70491 CT SOFT TISSUE NECK W/DYE: CPT | Mod: TC

## 2024-04-09 PROCEDURE — 70491 CT SOFT TISSUE NECK W/DYE: CPT | Mod: 26,,, | Performed by: RADIOLOGY

## 2024-04-09 PROCEDURE — 25500020 PHARM REV CODE 255: Performed by: INTERNAL MEDICINE

## 2024-04-09 RX ADMIN — IOHEXOL 100 ML: 350 INJECTION, SOLUTION INTRAVENOUS at 10:04

## 2024-04-10 ENCOUNTER — PATIENT MESSAGE (OUTPATIENT)
Dept: ADMINISTRATIVE | Facility: HOSPITAL | Age: 69
End: 2024-04-10
Payer: MEDICARE

## 2024-04-11 ENCOUNTER — PATIENT MESSAGE (OUTPATIENT)
Dept: PRIMARY CARE CLINIC | Facility: CLINIC | Age: 69
End: 2024-04-11
Payer: MEDICARE

## 2024-04-11 NOTE — PROGRESS NOTES
Subjective:      Yuriy Jerome is a 68 y.o. male who returns today regarding his     UDS shows RODRIGUEZ    Here to discuss options    Severe LUTS    Previous appe about 20 years ago.    The following portions of the patient's history were reviewed and updated as appropriate: allergies, current medications, past family history, past medical history, past social history, past surgical history and problem list.    Review of Systems  Pertinent items are noted in HPI.  A comprehensive multipoint review of systems was negative except as otherwise stated in the HPI.    Past Medical History:   Diagnosis Date    Allergy     Diabetes mellitus     prediabetic    Sleep apnea     Staphylococcal infection     left ankle, 2017     Past Surgical History:   Procedure Laterality Date    ADENOIDECTOMY  2001    UPPP surgery    APPENDECTOMY  2005    BICEPS TENDON REPAIR Left 12/28/2021    EYE SURGERY  2017    Cararact surgery    FOOT SURGERY Left 05/2017    subtalar implant, 6 weeks later removed due to failure    FOOT SURGERY Left 05/19/2022    HERNIA REPAIR      DOUBLE age 8    JOINT REPLACEMENT  2009    Bilateral knee replacement    KNEE SURGERY Bilateral 05/2011    TKR, had ligament replacement in right , meniscus repair both knees    LAMINECTOMY  1989    spinal    SPINE SURGERY  1991    Triple laminectomy    TONSILLECTOMY  2001    UPPP surgery    UVULOPALATOPHARYNGOPLASTY  2007    VASECTOMY  1985     Diabetes Medications               metFORMIN (GLUCOPHAGE) 1000 MG tablet Take 1 tablet (1,000 mg total) by mouth 2 (two) times daily with meals.          Review of patient's allergies indicates:   Allergen Reactions    Hay fever and allergy relief Other (See Comments)          Objective:   Vitals: There were no vitals taken for this visit.    Physical Exam   General: alert and oriented, no acute distress  Respiratory: Symmetric expansion, non-labored breathing  Cardiovascular: no peripheral edema  Abdomen: soft, non distended  Skin:  normal coloration and turgor, no rashes, no suspicious skin lesions noted  Neuro: no gross deficits  Psych: normal judgment and insight, normal mood/affect, and non-anxious    Physical Exam    Lab Review   Urinalysis demonstrates pending    Lab Results   Component Value Date    WBC 6.35 09/08/2023    HGB 14.9 09/08/2023    HCT 45.3 09/08/2023    MCV 94 09/08/2023     09/08/2023     Lab Results   Component Value Date    CREATININE 1.0 09/08/2023    BUN 16 09/08/2023       Imaging  --    Assessment and Plan:   BPH with urinary obstruction  Cont flomax and proscar  Discuss the various minimally invasive options and simple prostatectomy  Discussed risks, benefits and alternatives  Answered all questions    Schedule robotic simple prostatectomy 5/22  Urine cs    Consents done    Urgency incontinenc  Cont myrbetriq  UDS did not reproduce his symptoms

## 2024-04-12 ENCOUNTER — OFFICE VISIT (OUTPATIENT)
Dept: UROLOGY | Facility: CLINIC | Age: 69
End: 2024-04-12
Payer: MEDICARE

## 2024-04-12 VITALS
SYSTOLIC BLOOD PRESSURE: 132 MMHG | BODY MASS INDEX: 33.6 KG/M2 | OXYGEN SATURATION: 100 % | HEIGHT: 73 IN | DIASTOLIC BLOOD PRESSURE: 70 MMHG | RESPIRATION RATE: 12 BRPM | HEART RATE: 63 BPM | WEIGHT: 253.5 LBS

## 2024-04-12 DIAGNOSIS — N13.8 BPH WITH URINARY OBSTRUCTION: Primary | ICD-10-CM

## 2024-04-12 DIAGNOSIS — N40.1 BPH WITH URINARY OBSTRUCTION: Primary | ICD-10-CM

## 2024-04-12 LAB
BILIRUB UR QL STRIP: NEGATIVE
CLARITY UR REFRACT.AUTO: CLEAR
COLOR UR AUTO: YELLOW
GLUCOSE UR QL STRIP: NEGATIVE
HGB UR QL STRIP: NEGATIVE
KETONES UR QL STRIP: NEGATIVE
LEUKOCYTE ESTERASE UR QL STRIP: NEGATIVE
NITRITE UR QL STRIP: NEGATIVE
PH UR STRIP: 6 [PH] (ref 5–8)
PROT UR QL STRIP: NEGATIVE
SP GR UR STRIP: 1.02 (ref 1–1.03)
URN SPEC COLLECT METH UR: NORMAL

## 2024-04-12 PROCEDURE — 3078F DIAST BP <80 MM HG: CPT | Mod: CPTII,S$GLB,, | Performed by: UROLOGY

## 2024-04-12 PROCEDURE — 3288F FALL RISK ASSESSMENT DOCD: CPT | Mod: CPTII,S$GLB,, | Performed by: UROLOGY

## 2024-04-12 PROCEDURE — 1159F MED LIST DOCD IN RCRD: CPT | Mod: CPTII,S$GLB,, | Performed by: UROLOGY

## 2024-04-12 PROCEDURE — 3075F SYST BP GE 130 - 139MM HG: CPT | Mod: CPTII,S$GLB,, | Performed by: UROLOGY

## 2024-04-12 PROCEDURE — 81003 URINALYSIS AUTO W/O SCOPE: CPT | Performed by: UROLOGY

## 2024-04-12 PROCEDURE — 3044F HG A1C LEVEL LT 7.0%: CPT | Mod: CPTII,S$GLB,, | Performed by: UROLOGY

## 2024-04-12 PROCEDURE — 1101F PT FALLS ASSESS-DOCD LE1/YR: CPT | Mod: CPTII,S$GLB,, | Performed by: UROLOGY

## 2024-04-12 PROCEDURE — 3008F BODY MASS INDEX DOCD: CPT | Mod: CPTII,S$GLB,, | Performed by: UROLOGY

## 2024-04-12 PROCEDURE — 1126F AMNT PAIN NOTED NONE PRSNT: CPT | Mod: CPTII,S$GLB,, | Performed by: UROLOGY

## 2024-04-12 PROCEDURE — 99215 OFFICE O/P EST HI 40 MIN: CPT | Mod: S$GLB,,, | Performed by: UROLOGY

## 2024-04-21 ENCOUNTER — PATIENT MESSAGE (OUTPATIENT)
Dept: PRIMARY CARE CLINIC | Facility: CLINIC | Age: 69
End: 2024-04-21
Payer: MEDICARE

## 2024-04-21 DIAGNOSIS — E07.9 THYROID MASS: Primary | ICD-10-CM

## 2024-04-22 ENCOUNTER — HOSPITAL ENCOUNTER (OUTPATIENT)
Dept: RADIOLOGY | Facility: HOSPITAL | Age: 69
Discharge: HOME | End: 2024-04-22
Attending: INTERNAL MEDICINE
Payer: MEDICARE

## 2024-04-22 DIAGNOSIS — E07.9 THYROID MASS: ICD-10-CM

## 2024-04-22 PROCEDURE — 76536 US EXAM OF HEAD AND NECK: CPT | Mod: TC

## 2024-04-22 PROCEDURE — 76536 US EXAM OF HEAD AND NECK: CPT | Mod: 26,,, | Performed by: RADIOLOGY

## 2024-05-06 ENCOUNTER — PATIENT MESSAGE (OUTPATIENT)
Dept: PRIMARY CARE CLINIC | Facility: CLINIC | Age: 69
End: 2024-05-06
Payer: MEDICARE

## 2024-05-06 DIAGNOSIS — E07.9 THYROID MASS: Primary | ICD-10-CM

## 2024-05-09 ENCOUNTER — PATIENT MESSAGE (OUTPATIENT)
Dept: UROLOGY | Facility: CLINIC | Age: 69
End: 2024-05-09
Payer: MEDICARE

## 2024-05-09 ENCOUNTER — TELEPHONE (OUTPATIENT)
Dept: PRIMARY CARE CLINIC | Facility: CLINIC | Age: 69
End: 2024-05-09
Payer: MEDICARE

## 2024-05-09 DIAGNOSIS — R07.9 CHEST PAIN, UNSPECIFIED TYPE: Primary | ICD-10-CM

## 2024-05-09 NOTE — TELEPHONE ENCOUNTER
----- Message from Maryse Munguia sent at 5/9/2024 11:09 AM CDT -----  Type:  Needs Medical Advice    Who Called: Pt  Symptoms (please be specific): Chest Pain   How long has patient had these symptoms:  Thursday   Pharmacy name and phone #:    Would the patient rather a call back or a response via [facility]? call  Best Call Back Number: [phone]  Additional Information: please call regarding pt feels that he does not need to be seen in the ED \  Needing advise on a cardio DR

## 2024-05-09 NOTE — TELEPHONE ENCOUNTER
Spoke to pt. Pt states at [employer] last Thursday as he was rushing to find a good spot he starting feeling a tightness sensation on the right side of his chest. Pt states it comes and goes and it only happens when he's stressed about something. Pt denies any SOB or other symptoms. Pt does not want to go to ED. Pt having prostate sx in 2 weeks. Pt requesting referral for cardiology. See NP or can referral just be placed? Please advise for Dr. LANDON?

## 2024-05-10 ENCOUNTER — TELEPHONE (OUTPATIENT)
Dept: UROLOGY | Facility: CLINIC | Age: 69
End: 2024-05-10
Payer: MEDICARE

## 2024-05-10 DIAGNOSIS — R07.9 CHEST PAIN, UNSPECIFIED TYPE: Primary | ICD-10-CM

## 2024-05-10 NOTE — TELEPHONE ENCOUNTER
Messaged Kerrie to put case in the depot.  ----- Message from Karl Greene MD sent at 5/10/2024  1:00 PM CDT -----  Lilian    Please take his surgery off the schedule.  He is seeing cardiology for chest pain.  That will need to be worked up before his surgery    Norma SANTANA

## 2024-05-10 NOTE — TELEPHONE ENCOUNTER
Mr Jerome, I agree we should wait until the cardiologist evaluates this before doing surgery.  If you have more chest pain, I would go the the ER immediately.

## 2024-05-13 ENCOUNTER — OFFICE VISIT (OUTPATIENT)
Dept: CARDIOLOGY | Facility: CLINIC | Age: 69
End: 2024-05-13
Payer: MEDICARE

## 2024-05-13 VITALS
BODY MASS INDEX: 34 KG/M2 | HEIGHT: 72 IN | WEIGHT: 251 LBS | OXYGEN SATURATION: 98 % | DIASTOLIC BLOOD PRESSURE: 81 MMHG | HEART RATE: 59 BPM | SYSTOLIC BLOOD PRESSURE: 126 MMHG

## 2024-05-13 DIAGNOSIS — I10 ESSENTIAL HYPERTENSION: ICD-10-CM

## 2024-05-13 DIAGNOSIS — G47.33 OBSTRUCTIVE SLEEP APNEA: ICD-10-CM

## 2024-05-13 DIAGNOSIS — E66.9 OBESITY (BMI 35.0-39.9 WITHOUT COMORBIDITY): ICD-10-CM

## 2024-05-13 DIAGNOSIS — R07.9 CHEST PAIN, UNSPECIFIED TYPE: ICD-10-CM

## 2024-05-13 DIAGNOSIS — E78.5 HYPERLIPIDEMIA, UNSPECIFIED HYPERLIPIDEMIA TYPE: ICD-10-CM

## 2024-05-13 DIAGNOSIS — I20.89 STABLE ANGINA PECTORIS: ICD-10-CM

## 2024-05-13 DIAGNOSIS — E11.69 TYPE 2 DIABETES MELLITUS WITH OTHER SPECIFIED COMPLICATION, WITHOUT LONG-TERM CURRENT USE OF INSULIN: ICD-10-CM

## 2024-05-13 PROCEDURE — 1101F PT FALLS ASSESS-DOCD LE1/YR: CPT | Mod: CPTII,S$GLB,, | Performed by: INTERNAL MEDICINE

## 2024-05-13 PROCEDURE — 3074F SYST BP LT 130 MM HG: CPT | Mod: CPTII,S$GLB,, | Performed by: INTERNAL MEDICINE

## 2024-05-13 PROCEDURE — 3008F BODY MASS INDEX DOCD: CPT | Mod: CPTII,S$GLB,, | Performed by: INTERNAL MEDICINE

## 2024-05-13 PROCEDURE — 3288F FALL RISK ASSESSMENT DOCD: CPT | Mod: CPTII,S$GLB,, | Performed by: INTERNAL MEDICINE

## 2024-05-13 PROCEDURE — 99204 OFFICE O/P NEW MOD 45 MIN: CPT | Mod: 25,S$GLB,, | Performed by: INTERNAL MEDICINE

## 2024-05-13 PROCEDURE — 1159F MED LIST DOCD IN RCRD: CPT | Mod: CPTII,S$GLB,, | Performed by: INTERNAL MEDICINE

## 2024-05-13 PROCEDURE — 3044F HG A1C LEVEL LT 7.0%: CPT | Mod: CPTII,S$GLB,, | Performed by: INTERNAL MEDICINE

## 2024-05-13 PROCEDURE — 93000 ELECTROCARDIOGRAM COMPLETE: CPT | Mod: S$GLB,,, | Performed by: INTERNAL MEDICINE

## 2024-05-13 PROCEDURE — 3079F DIAST BP 80-89 MM HG: CPT | Mod: CPTII,S$GLB,, | Performed by: INTERNAL MEDICINE

## 2024-05-13 PROCEDURE — 99999 PR PBB SHADOW E&M-EST. PATIENT-LVL IV: CPT | Mod: PBBFAC,,, | Performed by: INTERNAL MEDICINE

## 2024-05-13 NOTE — H&P (VIEW-ONLY)
Subjective:   @Patient ID:  Yuriy Jerome is a 68 y.o. male who presents for evaluation of chest pain      HPI:   May 13, 2024:  Pleasant 68 years old gentleman here for Initial visit with me for chest pain and risk stratification.  Overall he is doing well.  Plan to have a prostate surgery.  For the last 2 weeks he noticed 3 episodes of chest tightness toward the left side.  It was all brought in by exertion.  He did not have similar symptoms before.  No significant shortness of breath, lower extremity edema, or PND.  BP is well-controlled as well as his lipids.    PMH significant for type 2 diabetes mellitus, HLP, HTN, and LACHO    SH: No tobacco abuse    FH:  Family history of valvular heart disease    Prior cardiovascular  Hx  --------------------------------  - EKG May 2024 showed sinus rhythm with single PVCs.  No acute ischemic changes    - EKG May 13, 2022.  Sinus rhythm without any ischemic changes           Patient Active Problem List    Diagnosis Date Noted    Morbid (severe) obesity due to excess calories 09/11/2023    Subluxation of tarsal joint of left foot 05/19/2022    Hyperlipidemia 12/15/2021    Essential hypertension 12/15/2021    Benign prostatic hyperplasia 12/15/2021    Obesity (BMI 35.0-39.9 without comorbidity) 12/15/2021    Type 2 diabetes mellitus with other specified complication, without long-term current use of insulin     Insomnia 08/24/2018    Obstructive sleep apnea 05/26/2016                    LAST HbA1c  Lab Results   Component Value Date    HGBA1C 6.8 (H) 04/01/2024       Lipid panel  Lab Results   Component Value Date    CHOL 137 09/08/2023    CHOL 133 08/17/2022    CHOL 240 (H) 09/30/2011     Lab Results   Component Value Date    HDL 37 (L) 09/08/2023    HDL 41 08/17/2022    HDL 37 (L) 09/30/2011     Lab Results   Component Value Date    LDLCALC 65.2 09/08/2023    LDLCALC 49.6 (L) 08/17/2022    LDLCALC 148.0 09/30/2011     Lab Results   Component Value Date    TRIG 174 (H)  09/08/2023    TRIG 212 (H) 08/17/2022    TRIG 276 (H) 09/30/2011     Lab Results   Component Value Date    CHOLHDL 27.0 09/08/2023    CHOLHDL 30.8 08/17/2022    CHOLHDL 15.4 (L) 09/30/2011            Review of Systems   Constitutional: Negative for chills and fever.   HENT:  Negative for hearing loss and nosebleeds.    Eyes:  Negative for blurred vision.   Cardiovascular:         As in HPI   Respiratory:  Negative for hemoptysis and shortness of breath.    Hematologic/Lymphatic: Negative for bleeding problem.   Skin:  Negative for itching.   Musculoskeletal:  Negative for falls.   Gastrointestinal:  Negative for abdominal pain and hematochezia.   Genitourinary:  Negative for hematuria.   Neurological:  Negative for dizziness and loss of balance.   Psychiatric/Behavioral:  Negative for altered mental status and depression.        Objective:   Physical Exam  Constitutional:       Appearance: He is well-developed. He is obese.   HENT:      Head: Normocephalic and atraumatic.   Eyes:      Conjunctiva/sclera: Conjunctivae normal.   Neck:      Vascular: No carotid bruit or JVD.   Cardiovascular:      Rate and Rhythm: Normal rate and regular rhythm.      Pulses:           Carotid pulses are 2+ on the right side and 2+ on the left side.       Radial pulses are 2+ on the right side and 2+ on the left side.      Heart sounds: Normal heart sounds. No murmur heard.     No friction rub. No gallop.   Pulmonary:      Effort: Pulmonary effort is normal. No respiratory distress.      Breath sounds: Normal breath sounds. No stridor. No wheezing.   Abdominal:      General: Abdomen is flat.      Palpations: Abdomen is soft.   Musculoskeletal:      Cervical back: Neck supple.   Skin:     General: Skin is warm and dry.   Neurological:      Mental Status: He is alert and oriented to person, place, and time.   Psychiatric:         Behavior: Behavior normal.         Assessment:     1. Obesity (BMI 35.0-39.9 without comorbidity)    2. Type 2  diabetes mellitus with other specified complication, without long-term current use of insulin    3. Hyperlipidemia, unspecified hyperlipidemia type    4. Essential hypertension    5. Obstructive sleep apnea        Plan:   Reported chest pain.  Concerning for myocardial ischemia.      Given his risk factors, and symptoms we will go ahead and proceed with exercise stress MPI for risk stratification.  Check an echocardiogram for EF, valves, diastolic function.  If stress test and echocardiogram showed no major abnormalities then okay to proceed from cardiac standpoint.    Continue primary preventive measures with aspirin and statin.  LDL well-controlled    BP is well-controlled    Continue CPAP for LACHO  Six-month follow-up or sooner p.r.n.  Pertinent cardiac images and EKG reviewed independently.    Continue with current medical plan and lifestyle changes.  Return sooner for concerns or questions. If symptoms persist go to the ED  I have reviewed all pertinent data including patient's medical history in detail and updated the computerized patient record.     No orders of the defined types were placed in this encounter.      Follow up as scheduled.     He expressed verbal understanding and agreed with the plan    Patient's Medications   New Prescriptions    No medications on file   Previous Medications    ALPHA LIPOIC ACID 600 MG CAP    Take by mouth.    AMLODIPINE (NORVASC) 5 MG TABLET    TAKE 1 TABLET(5 MG) BY MOUTH EVERY DAY    ASPIRIN (ECOTRIN) 81 MG EC TABLET    Take 81 mg by mouth once daily.    COQ10, UBIQUINOL, 200 MG CAP        FINASTERIDE (PROSCAR) 5 MG TABLET    Take 1 tablet (5 mg total) by mouth once daily.    FLUTICASONE PROPIONATE (FLONASE) 50 MCG/ACTUATION NASAL SPRAY    fluticasone propionate 50 mcg/actuation nasal spray,suspension    LOSARTAN-HYDROCHLOROTHIAZIDE 100-12.5 MG (HYZAAR) 100-12.5 MG TAB    TAKE 1 TABLET BY MOUTH EVERY DAY    METFORMIN (GLUCOPHAGE) 1000 MG TABLET    Take 1 tablet (1,000 mg  total) by mouth 2 (two) times daily with meals.    MIRABEGRON (MYRBETRIQ) 50 MG TB24    Take 1 tablet (50 mg total) by mouth once daily.    MULTIVIT WITH MINERALS/LUTEIN (MULTIVITAMIN 50 PLUS ORAL)    Take by mouth.    OMEGA-3 FATTY ACIDS/FISH OIL (FISH OIL-OMEGA-3 FATTY ACIDS) 300-1,000 MG CAPSULE    Take by mouth once daily.    ROSUVASTATIN (CRESTOR) 20 MG TABLET    TAKE 1 TABLET(20 MG) BY MOUTH EVERY DAY    UNABLE TO FIND    osteobiflex   Modified Medications    No medications on file   Discontinued Medications    No medications on file

## 2024-05-14 DIAGNOSIS — E04.2 MULTIPLE THYROID NODULES: Primary | ICD-10-CM

## 2024-05-14 LAB
OHS QRS DURATION: 90 MS
OHS QTC CALCULATION: 402 MS

## 2024-05-16 ENCOUNTER — TELEPHONE (OUTPATIENT)
Dept: INTERVENTIONAL RADIOLOGY/VASCULAR | Facility: CLINIC | Age: 69
End: 2024-05-16
Payer: MEDICARE

## 2024-05-17 ENCOUNTER — TELEPHONE (OUTPATIENT)
Dept: CARDIOLOGY | Facility: CLINIC | Age: 69
End: 2024-05-17

## 2024-05-17 ENCOUNTER — HOSPITAL ENCOUNTER (OUTPATIENT)
Dept: CARDIOLOGY | Facility: HOSPITAL | Age: 69
Discharge: HOME | End: 2024-05-17
Attending: INTERNAL MEDICINE
Payer: MEDICARE

## 2024-05-17 VITALS — WEIGHT: 251 LBS | HEIGHT: 72 IN | BODY MASS INDEX: 34 KG/M2

## 2024-05-17 DIAGNOSIS — R07.9 CHEST PAIN, UNSPECIFIED TYPE: ICD-10-CM

## 2024-05-17 LAB
ASCENDING AORTA: 3.68 CM
AV INDEX (PROSTH): 0.87
AV MEAN GRADIENT: 2 MMHG
AV PEAK GRADIENT: 3 MMHG
AV VALVE AREA BY VELOCITY RATIO: 3.86 CM²
AV VALVE AREA: 3.23 CM²
AV VELOCITY RATIO: 1.03
BSA FOR ECHO PROCEDURE: 2.4 M2
CV ECHO LV RWT: 0.51 CM
DOP CALC AO PEAK VEL: 0.89 M/S
DOP CALC AO VTI: 17.3 CM
DOP CALC LVOT AREA: 3.7 CM2
DOP CALC LVOT DIAMETER: 2.18 CM
DOP CALC LVOT PEAK VEL: 0.92 M/S
DOP CALC LVOT STROKE VOLUME: 55.96 CM3
DOP CALC MV VTI: 22.7 CM
DOP CALCLVOT PEAK VEL VTI: 15 CM
E WAVE DECELERATION TIME: 134.91 MSEC
E/A RATIO: 1.31
E/E' RATIO: 9 M/S
ECHO LV POSTERIOR WALL: 1.29 CM (ref 0.6–1.1)
FRACTIONAL SHORTENING: 38 % (ref 28–44)
INTERVENTRICULAR SEPTUM: 0.85 CM (ref 0.6–1.1)
IVC DIAMETER: 1.81 CM
LA MAJOR: 6.27 CM
LA MINOR: 5.82 CM
LA WIDTH: 4.2 CM
LEFT ATRIUM SIZE: 4.4 CM
LEFT ATRIUM VOLUME INDEX MOD: 28 ML/M2
LEFT ATRIUM VOLUME INDEX: 40.4 ML/M2
LEFT ATRIUM VOLUME MOD: 65.88 CM3
LEFT ATRIUM VOLUME: 94.82 CM3
LEFT INTERNAL DIMENSION IN SYSTOLE: 3.18 CM (ref 2.1–4)
LEFT VENTRICLE DIASTOLIC VOLUME INDEX: 52.59 ML/M2
LEFT VENTRICLE DIASTOLIC VOLUME: 123.58 ML
LEFT VENTRICLE MASS INDEX: 88 G/M2
LEFT VENTRICLE SYSTOLIC VOLUME INDEX: 17.2 ML/M2
LEFT VENTRICLE SYSTOLIC VOLUME: 40.42 ML
LEFT VENTRICULAR INTERNAL DIMENSION IN DIASTOLE: 5.1 CM (ref 3.5–6)
LEFT VENTRICULAR MASS: 205.98 G
LV LATERAL E/E' RATIO: 9 M/S
LV SEPTAL E/E' RATIO: 9 M/S
LVOT MG: 1.96 MMHG
LVOT MV: 0.67 CM/S
MV PEAK A VEL: 0.62 M/S
MV PEAK E VEL: 0.81 M/S
MV PEAK GRADIENT: 3 MMHG
MV STENOSIS PRESSURE HALF TIME: 39.12 MS
MV VALVE AREA BY CONTINUITY EQUATION: 2.47 CM2
MV VALVE AREA P 1/2 METHOD: 5.62 CM2
OHS LV EJECTION FRACTION SIMPSONS BIPLANE MOD: 70 %
PISA TR MAX VEL: 1.9 M/S
PULM VEIN S/D RATIO: 1.26
PV PEAK D VEL: 0.35 M/S
PV PEAK GRADIENT: 3 MMHG
PV PEAK S VEL: 0.44 M/S
PV PEAK VELOCITY: 0.81 M/S
RA MAJOR: 6.01 CM
RA PRESSURE ESTIMATED: 3 MMHG
RA WIDTH: 3.4 CM
RV TB RVSP: 5 MMHG
RV TISSUE DOPPLER FREE WALL SYSTOLIC VELOCITY 1 (APICAL 4 CHAMBER VIEW): 17.37 CM/S
SINUS: 4.16 CM
STJ: 3.68 CM
TDI LATERAL: 0.09 M/S
TDI SEPTAL: 0.09 M/S
TDI: 0.09 M/S
TR MAX PG: 14 MMHG
TRICUSPID ANNULAR PLANE SYSTOLIC EXCURSION: 2.06 CM
TV REST PULMONARY ARTERY PRESSURE: 17 MMHG
Z-SCORE OF LEFT VENTRICULAR DIMENSION IN END DIASTOLE: -6.56
Z-SCORE OF LEFT VENTRICULAR DIMENSION IN END SYSTOLE: -4.9

## 2024-05-17 PROCEDURE — 93306 TTE W/DOPPLER COMPLETE: CPT

## 2024-05-17 PROCEDURE — 93306 TTE W/DOPPLER COMPLETE: CPT | Mod: 26,,, | Performed by: INTERNAL MEDICINE

## 2024-05-17 NOTE — PROGRESS NOTES
Please call the patient with the results.  Echocardiogram results is normal.  Normal heart pumping function and no major abnormalities    Sincerely,  Raji Noble MD.   Interventional Cardiologist  [facility] [place]

## 2024-05-17 NOTE — TELEPHONE ENCOUNTER
Sent patient a my chart message that her Echocardiogram results is normal.  Normal heart pumping function and no major abnormalities      ----- Message from Raji Noble MD sent at 5/17/2024  2:34 PM CDT -----  Please call the patient with the results.  Echocardiogram results is normal.  Normal heart pumping function and no major abnormalities    Sincerely,  Raji Noble MD.   Interventional Cardiologist  [facility] [place]

## 2024-05-20 ENCOUNTER — PATIENT MESSAGE (OUTPATIENT)
Dept: INTERVENTIONAL RADIOLOGY/VASCULAR | Facility: HOSPITAL | Age: 69
End: 2024-05-20
Payer: MEDICARE

## 2024-05-20 NOTE — NURSING
V/M: advised to arrive at 8:15, where to check in, no need to fast, may drive self, take meds as usual, bring current list of home meds. Will verify allergies and blood thinner use upon arrival.

## 2024-05-21 ENCOUNTER — HOSPITAL ENCOUNTER (OUTPATIENT)
Dept: INTERVENTIONAL RADIOLOGY/VASCULAR | Facility: HOSPITAL | Age: 69
Discharge: HOME | End: 2024-05-21
Attending: INTERNAL MEDICINE
Payer: MEDICARE

## 2024-05-21 VITALS
OXYGEN SATURATION: 94 % | HEIGHT: 60 IN | SYSTOLIC BLOOD PRESSURE: 149 MMHG | HEART RATE: 64 BPM | BODY MASS INDEX: 49.08 KG/M2 | RESPIRATION RATE: 12 BRPM | TEMPERATURE: 97 F | WEIGHT: 250 LBS | DIASTOLIC BLOOD PRESSURE: 78 MMHG

## 2024-05-21 DIAGNOSIS — E07.9 THYROID MASS: ICD-10-CM

## 2024-05-21 DIAGNOSIS — E04.2 MULTIPLE THYROID NODULES: ICD-10-CM

## 2024-05-21 PROCEDURE — 88177 CYTP FNA EVAL EA ADDL: CPT | Performed by: PATHOLOGY

## 2024-05-21 PROCEDURE — 10006 FNA BX W/US GDN EA ADDL: CPT | Mod: RT,,, | Performed by: PHYSICIAN ASSISTANT

## 2024-05-21 PROCEDURE — 88173 CYTOPATH EVAL FNA REPORT: CPT | Mod: 26,,, | Performed by: PATHOLOGY

## 2024-05-21 PROCEDURE — 88173 CYTOPATH EVAL FNA REPORT: CPT | Performed by: PATHOLOGY

## 2024-05-21 PROCEDURE — 10005 FNA BX W/US GDN 1ST LES: CPT | Mod: RT | Performed by: STUDENT IN AN ORGANIZED HEALTH CARE EDUCATION/TRAINING PROGRAM

## 2024-05-21 PROCEDURE — 88172 CYTP DX EVAL FNA 1ST EA SITE: CPT | Mod: 59 | Performed by: PATHOLOGY

## 2024-05-21 PROCEDURE — 25000003 PHARM REV CODE 250: Performed by: PHYSICIAN ASSISTANT

## 2024-05-21 PROCEDURE — 10006 FNA BX W/US GDN EA ADDL: CPT | Mod: RT | Performed by: STUDENT IN AN ORGANIZED HEALTH CARE EDUCATION/TRAINING PROGRAM

## 2024-05-21 PROCEDURE — 88172 CYTP DX EVAL FNA 1ST EA SITE: CPT | Mod: 26,,, | Performed by: PATHOLOGY

## 2024-05-21 PROCEDURE — 10005 FNA BX W/US GDN 1ST LES: CPT | Mod: RT,,, | Performed by: PHYSICIAN ASSISTANT

## 2024-05-21 PROCEDURE — 88177 CYTP FNA EVAL EA ADDL: CPT | Mod: 26,,, | Performed by: PATHOLOGY

## 2024-05-21 RX ORDER — LIDOCAINE HYDROCHLORIDE 10 MG/ML
INJECTION INFILTRATION; PERINEURAL
Status: COMPLETED | OUTPATIENT
Start: 2024-05-21 | End: 2024-05-21

## 2024-05-21 RX ADMIN — LIDOCAINE HYDROCHLORIDE 5 ML: 10 INJECTION, SOLUTION INFILTRATION; PERINEURAL at 09:05

## 2024-05-21 NOTE — SEDATION DOCUMENTATION
Patient thought he was getting a left thyroid biopsy but confirmed with giorgio it is right and isthimus per Dr. Blair. Patient agrees to procede.

## 2024-05-21 NOTE — DISCHARGE SUMMARY
Interventional Radiology Short Stay Discharge Summary      Admit Date: 5/21/2024  Discharge Date: 05/21/2024     Hospital Course: Uneventful    Discharge Diagnosis: multinodular thyroid    Discharge Condition: Stable    Discharge Disposition: Home    Diet: Resume prior diet    Activity: activity as tolerated    Follow-up: With referring provider    Cindy Chava, PA-C [facility]

## 2024-05-21 NOTE — PROCEDURES
Interventional Radiology Immediate Post-Procedure Note    Pre-Op Diagnosis: Thyroid Nodule  Post-Op Diagnosis: Same    Procedure:     Procedure performed by: Gwen Arreguin PA-C  Assistants: None    Estimated Blood Loss: Minimal  Specimen Removed: Yes    Findings/description of procedure:  25 g FNA x 2 passes made through nodule in the R lobe and 25 g FNA x 3 passes in the isthmus.  Adequacy of specimen confirmed.    No immediate complications. Patient tolerated procedure well. Please see full dictated procedure report for additional details and recommendations.      Cindy Chava, PA-C [facility]

## 2024-05-21 NOTE — H&P
Interventional Radiology Pre-Procedure History & Physical      Chief Complaint/Reason for Referral: multinodular thyroid    History of Present Illness:  Yuriy Jerome is a 68 y.o. male who presents for FNA    Past Medical History:   Diagnosis Date    Allergy     Diabetes mellitus     prediabetic    Sleep apnea     Staphylococcal infection     left ankle, 2017     Past Surgical History:   Procedure Laterality Date    ADENOIDECTOMY  2001    UPPP surgery    APPENDECTOMY  2005    BICEPS TENDON REPAIR Left 12/28/2021    EYE SURGERY  2017    Cararact surgery    FOOT SURGERY Left 05/2017    subtalar implant, 6 weeks later removed due to failure    FOOT SURGERY Left 05/19/2022    HERNIA REPAIR      DOUBLE age 8    JOINT REPLACEMENT  2009    Bilateral knee replacement    KNEE SURGERY Bilateral 05/2011    TKR, had ligament replacement in right , meniscus repair both knees    LAMINECTOMY  1989    spinal    SPINE SURGERY  1991    Triple laminectomy    TONSILLECTOMY  2001    UPPP surgery    UVULOPALATOPHARYNGOPLASTY  2007    VASECTOMY  1985       Allergies:   Review of patient's allergies indicates:   Allergen Reactions    Hay fever and allergy relief Other (See Comments)       Home Meds:   Prior to Admission medications    Medication Sig Start Date End Date Taking? Authorizing Provider   alpha lipoic acid 600 mg Cap Take by mouth.   Yes Provider, Historical   amLODIPine (NORVASC) 5 MG tablet TAKE 1 TABLET(5 MG) BY MOUTH EVERY DAY 12/5/23  Yes Althea Jerome MD   aspirin (ECOTRIN) 81 MG EC tablet Take 81 mg by mouth once daily.   Yes Provider, Historical   coQ10, ubiquinol, 200 mg Cap  1/1/22  Yes Provider, Historical   finasteride (PROSCAR) 5 mg tablet Take 1 tablet (5 mg total) by mouth once daily. 4/11/23  Yes Karl Greene MD   fluticasone propionate (FLONASE) 50 mcg/actuation nasal spray fluticasone propionate 50 mcg/actuation nasal spray,suspension   Yes Provider, Historical   losartan-hydrochlorothiazide  100-12.5 mg (HYZAAR) 100-12.5 mg Tab TAKE 1 TABLET BY MOUTH EVERY DAY 3/2/24  Yes Althea Jerome MD   metFORMIN (GLUCOPHAGE) 1000 MG tablet Take 1 tablet (1,000 mg total) by mouth 2 (two) times daily with meals. 9/11/23  Yes Althea Jerome MD   mirabegron (MYRBETRIQ) 50 mg Tb24 Take 1 tablet (50 mg total) by mouth once daily. 4/11/23  Yes Karl Greene MD   multivit with minerals/lutein (MULTIVITAMIN 50 PLUS ORAL) Take by mouth. 1/22/20  Yes Provider, Historical   rosuvastatin (CRESTOR) 20 MG tablet TAKE 1 TABLET(20 MG) BY MOUTH EVERY DAY 12/2/23  Yes Althea Jerome MD   UNABLE TO FIND osteobiflex   Yes Provider, Historical   omega-3 fatty acids/fish oil (FISH OIL-OMEGA-3 FATTY ACIDS) 300-1,000 mg capsule Take by mouth once daily.    Provider, Historical       Anticoagulation/Antiplatelet Meds: aspirin    Review of Systems:   Hematological: no known coagulopathies  Respiratory: no shortness of breath  Cardiovascular: no chest pain  Gastrointestinal: no abdominal pain  Genitourinary: no dysuria  Musculoskeletal: negative  Neurological: no TIA or stroke symptoms     Physical Exam:       General: WNWD, NAD  HEENT: Normocephalic, sclera anicteric  Neck: Supple  Heart: RRR by pulse  Lungs: Symmetric excursions, breathing unlabored  Abd: Nondistended  Extremities: MAEW  Neuro: AA x 3    Laboratory:  Lab Results   Component Value Date    INR 0.9 10/10/2005       Lab Results   Component Value Date    WBC 6.35 09/08/2023    HGB 14.9 09/08/2023    HCT 45.3 09/08/2023    MCV 94 09/08/2023     09/08/2023      Lab Results   Component Value Date     (H) 09/08/2023     09/08/2023    K 4.6 09/08/2023     09/08/2023    CO2 28 09/08/2023    BUN 16 09/08/2023    CREATININE 1.0 09/08/2023    CALCIUM 9.9 09/08/2023    ALT 34 09/08/2023    AST 21 09/08/2023    ALBUMIN 4.3 09/08/2023    BILITOT 0.6 09/08/2023       Imaging:  US 4/22/24 reviewed.    Assessment/Plan:  68 y.o. male with multinodular  thyroid. Will undergo FNA today of R lower lobe and isthmus nodule    Sedation plan: None    Risks (including, but not limited to, pain, bleeding, infection, damage to nearby structures, treatment failure/recurrence, and the need for additional procedures), potential benefits, and alternatives were discussed with the patient. All questions were answered to the best of my abilities. The patient wishes to proceed. Written informed consent was obtained.    Cindy Chava, PA-C [facility]

## 2024-05-22 ENCOUNTER — HOSPITAL ENCOUNTER (OUTPATIENT)
Dept: CARDIOLOGY | Facility: HOSPITAL | Age: 69
Discharge: HOME | End: 2024-05-22
Attending: INTERNAL MEDICINE
Payer: MEDICARE

## 2024-05-22 ENCOUNTER — HOSPITAL ENCOUNTER (OUTPATIENT)
Dept: RADIOLOGY | Facility: HOSPITAL | Age: 69
Discharge: HOME | End: 2024-05-22
Attending: INTERNAL MEDICINE
Payer: MEDICARE

## 2024-05-22 VITALS — BODY MASS INDEX: 35.14 KG/M2 | HEIGHT: 71 IN | WEIGHT: 251 LBS

## 2024-05-22 DIAGNOSIS — I20.89 STABLE ANGINA PECTORIS: ICD-10-CM

## 2024-05-22 DIAGNOSIS — R07.9 CHEST PAIN, UNSPECIFIED TYPE: ICD-10-CM

## 2024-05-22 LAB
CV STRESS BASE HR: 62 BPM
DIASTOLIC BLOOD PRESSURE: 79 MMHG
OHS CV CPX 1 MINUTE RECOVERY HEART RATE: 113 BPM
OHS CV CPX 85 PERCENT MAX PREDICTED HEART RATE MALE: 129
OHS CV CPX ESTIMATED METS: 11
OHS CV CPX MAX PREDICTED HEART RATE: 152
OHS CV CPX PATIENT IS FEMALE: 0
OHS CV CPX PATIENT IS MALE: 1
OHS CV CPX PEAK DIASTOLIC BLOOD PRESSURE: 100 MMHG
OHS CV CPX PEAK HEAR RATE: 131 BPM
OHS CV CPX PEAK RATE PRESSURE PRODUCT: NORMAL
OHS CV CPX PEAK SYSTOLIC BLOOD PRESSURE: 189 MMHG
OHS CV CPX PERCENT MAX PREDICTED HEART RATE ACHIEVED: 86
OHS CV CPX RATE PRESSURE PRODUCT PRESENTING: 9238
STRESS ECHO POST EXERCISE DUR MIN: 6 MINUTES
STRESS ECHO POST EXERCISE DUR SEC: 33 SECONDS
STRESS ST DEPRESSION: 0.5 MM
SYSTOLIC BLOOD PRESSURE: 149 MMHG

## 2024-05-22 PROCEDURE — A9500 TC99M SESTAMIBI: HCPCS | Performed by: INTERNAL MEDICINE

## 2024-05-22 PROCEDURE — 78452 HT MUSCLE IMAGE SPECT MULT: CPT | Mod: 26,,, | Performed by: STUDENT IN AN ORGANIZED HEALTH CARE EDUCATION/TRAINING PROGRAM

## 2024-05-22 PROCEDURE — 93016 CV STRESS TEST SUPVJ ONLY: CPT | Mod: ,,, | Performed by: INTERNAL MEDICINE

## 2024-05-22 PROCEDURE — 78452 HT MUSCLE IMAGE SPECT MULT: CPT | Mod: TC

## 2024-05-22 PROCEDURE — 93017 CV STRESS TEST TRACING ONLY: CPT

## 2024-05-22 PROCEDURE — 93018 CV STRESS TEST I&R ONLY: CPT | Mod: ,,, | Performed by: INTERNAL MEDICINE

## 2024-05-22 RX ORDER — TETRAKIS(2-METHOXYISOBUTYLISOCYANIDE)COPPER(I) TETRAFLUOROBORATE 1 MG/ML
28 INJECTION, POWDER, LYOPHILIZED, FOR SOLUTION INTRAVENOUS
Status: COMPLETED | OUTPATIENT
Start: 2024-05-22 | End: 2024-05-22

## 2024-05-22 RX ORDER — TETRAKIS(2-METHOXYISOBUTYLISOCYANIDE)COPPER(I) TETRAFLUOROBORATE 1 MG/ML
10.7 INJECTION, POWDER, LYOPHILIZED, FOR SOLUTION INTRAVENOUS
Status: COMPLETED | OUTPATIENT
Start: 2024-05-22 | End: 2024-05-22

## 2024-05-22 RX ADMIN — KIT FOR THE PREPARATION OF TECHNETIUM TC99M SESTAMIBI 10.7 MILLICURIE: 1 INJECTION, POWDER, LYOPHILIZED, FOR SOLUTION PARENTERAL at 08:05

## 2024-05-22 RX ADMIN — KIT FOR THE PREPARATION OF TECHNETIUM TC99M SESTAMIBI 28 MILLICURIE: 1 INJECTION, POWDER, LYOPHILIZED, FOR SOLUTION PARENTERAL at 10:05

## 2024-05-22 NOTE — NURSING NOTE
Called to room by Treadmill tech. Mr Jerome had 5/10 chest pain at peak. Once to bedside stated 7/10 with /100. No ST wave changes, isolated PVCs. Diabetic, with HTN. No known blockages per patient. Cards fellow Dr. Ann at bedside to assess. Symptoms resolved and taken to Radiology for imaging accompanied by Nuclear tech.

## 2024-05-22 NOTE — PROGRESS NOTES
I tried to call the patient with the stress test results.  Please let him know that the stress test is abnormal.  It is suggesting possible blockage that is interfering with the blood flow with the heart muscle.  I would like to proceed former procedure called coronary angiogram to check for blockages.  If he is okay with that we will go ahead and schedule the procedure.  Thank you

## 2024-05-23 ENCOUNTER — TELEPHONE (OUTPATIENT)
Dept: CARDIOLOGY | Facility: CLINIC | Age: 69
End: 2024-05-23
Payer: MEDICARE

## 2024-05-23 DIAGNOSIS — R94.39 ABNORMAL CARDIOVASCULAR STRESS TEST: Primary | ICD-10-CM

## 2024-05-23 DIAGNOSIS — I20.9 ANGINA, CLASS III: ICD-10-CM

## 2024-05-23 RX ORDER — SODIUM CHLORIDE 9 MG/ML
INJECTION, SOLUTION INTRAVENOUS CONTINUOUS
Status: CANCELLED | OUTPATIENT
Start: 2024-05-23 | End: 2024-05-23

## 2024-05-23 RX ORDER — CLOPIDOGREL BISULFATE 75 MG/1
75 TABLET ORAL DAILY
Qty: 90 TABLET | Refills: 3 | Status: SHIPPED | OUTPATIENT
Start: 2024-05-23

## 2024-05-23 RX ORDER — SODIUM CHLORIDE 0.9 % (FLUSH) 0.9 %
10 SYRINGE (ML) INJECTION
Status: SHIPPED | OUTPATIENT
Start: 2024-05-23

## 2024-05-23 RX ORDER — DIPHENHYDRAMINE HCL 50 MG
50 CAPSULE ORAL ONCE
Status: CANCELLED | OUTPATIENT
Start: 2024-05-23 | End: 2024-05-23

## 2024-05-23 NOTE — TELEPHONE ENCOUNTER
----- Message from Raji Noble MD sent at 5/22/2024  3:52 PM CDT -----   I tried to call the patient with the stress test results.  Please let him know that the stress test is abnormal.  It is suggesting possible blockage that is interfering with the blood flow with the heart muscle.  I would like to proceed former jeramie price called coronary angiogram to check for blockages.  If he is okay with that we will go ahead and schedule the procedure.  Thank you

## 2024-05-23 NOTE — TELEPHONE ENCOUNTER
Attempted to reach in regards to stress test results     LVM on cell #    2nd attempt on home # and was able to reach    Notified of test results per Dr Crawleysef and he wishes to proceed with the Coronary angiogram as discussed.

## 2024-05-23 NOTE — TELEPHONE ENCOUNTER
I placed the order for the angiogram.  I would like him to start Plavix now.  I have sent the prescription for the Plavix.  Thanks

## 2024-05-23 NOTE — TELEPHONE ENCOUNTER
Attempted to reach LVM with spouse # and his cell# with instructions per Dr Noble : start Plavix now. I have sent the prescription for the Plavix     Detailed message left with instructions and office # for return call for questions or concerns

## 2024-05-24 ENCOUNTER — TELEPHONE (OUTPATIENT)
Dept: CARDIOLOGY | Facility: CLINIC | Age: 69
End: 2024-05-24
Payer: MEDICARE

## 2024-05-24 LAB
ADEQUACY: ABNORMAL
COMMENT: ABNORMAL
FINAL PATHOLOGIC DIAGNOSIS: ABNORMAL
Lab: ABNORMAL

## 2024-05-24 NOTE — TELEPHONE ENCOUNTER
I had already spoke to  about procedure and MEDS.    Nw              ----- Message from Manpreet Fregoso sent at 5/24/2024  2:55 PM CDT -----  .Type:  Needs Medical Advice    Who Called: Leigha Jerome    Would the patient rather a call back or a response via [facility]? Call back  Best Call Back Number:   Additional Information:     Leigha stated she missed a call and would like a call back

## 2024-05-24 NOTE — TELEPHONE ENCOUNTER
Reached out to  today he had questions regarding angiogram ,I gave him CATH  LAB #.    Patient driving at the time and asked me leave him V/message with Cath Lab Numbers.      Nw                              ----- Message from Lindsey Noel sent at 5/24/2024 11:38 AM CDT -----  Type:  Needs Medical Advice    Who Called: pt  Symptoms (please be specific): pt was needing information on his appt with the Cat lab appt on June 4 please call right back, pt has questions, was expecting a call back to discuss   Would the patient rather a call back or a response via [facility]? call  Best Call Back Number: [phone]  Additional Information:

## 2024-06-03 ENCOUNTER — LAB VISIT (OUTPATIENT)
Dept: LAB | Facility: HOSPITAL | Age: 69
End: 2024-06-03
Attending: INTERNAL MEDICINE
Payer: MEDICARE

## 2024-06-03 DIAGNOSIS — Z01.810 PRE-OPERATIVE CARDIOVASCULAR EXAMINATION: ICD-10-CM

## 2024-06-03 LAB
ANION GAP SERPL CALC-SCNC: 10 MMOL/L (ref 8–16)
BASOPHILS # BLD AUTO: 0.05 K/UL (ref 0–0.2)
BASOPHILS NFR BLD: 0.8 % (ref 0–1.9)
BUN SERPL-MCNC: 16 MG/DL (ref 8–23)
CALCIUM SERPL-MCNC: 10.1 MG/DL (ref 8.7–10.5)
CHLORIDE SERPL-SCNC: 101 MMOL/L (ref 95–110)
CO2 SERPL-SCNC: 26 MMOL/L (ref 23–29)
CREAT SERPL-MCNC: 1 MG/DL (ref 0.5–1.4)
DIFFERENTIAL METHOD BLD: ABNORMAL
EOSINOPHIL # BLD AUTO: 0.2 K/UL (ref 0–0.5)
EOSINOPHIL NFR BLD: 2.9 % (ref 0–8)
ERYTHROCYTE [DISTWIDTH] IN BLOOD BY AUTOMATED COUNT: 13 % (ref 11.5–14.5)
EST. GFR  (NO RACE VARIABLE): >60 ML/MIN/1.73 M^2
GLUCOSE SERPL-MCNC: 201 MG/DL (ref 70–110)
HCT VFR BLD AUTO: 46.5 % (ref 40–54)
HGB BLD-MCNC: 15.3 G/DL (ref 14–18)
IMM GRANULOCYTES # BLD AUTO: 0.04 K/UL (ref 0–0.04)
IMM GRANULOCYTES NFR BLD AUTO: 0.6 % (ref 0–0.5)
LYMPHOCYTES # BLD AUTO: 1.4 K/UL (ref 1–4.8)
LYMPHOCYTES NFR BLD: 21.7 % (ref 18–48)
MCH RBC QN AUTO: 31.6 PG (ref 27–31)
MCHC RBC AUTO-ENTMCNC: 32.9 G/DL (ref 32–36)
MCV RBC AUTO: 96 FL (ref 82–98)
MONOCYTES # BLD AUTO: 0.6 K/UL (ref 0.3–1)
MONOCYTES NFR BLD: 8.7 % (ref 4–15)
NEUTROPHILS # BLD AUTO: 4.3 K/UL (ref 1.8–7.7)
NEUTROPHILS NFR BLD: 65.3 % (ref 38–73)
NRBC BLD-RTO: 0 /100 WBC
PLATELET # BLD AUTO: 242 K/UL (ref 150–450)
PMV BLD AUTO: 10.5 FL (ref 9.2–12.9)
POTASSIUM SERPL-SCNC: 4.3 MMOL/L (ref 3.5–5.1)
RBC # BLD AUTO: 4.84 M/UL (ref 4.6–6.2)
SODIUM SERPL-SCNC: 137 MMOL/L (ref 136–145)
WBC # BLD AUTO: 6.63 K/UL (ref 3.9–12.7)

## 2024-06-03 PROCEDURE — 85025 COMPLETE CBC W/AUTO DIFF WBC: CPT | Performed by: INTERNAL MEDICINE

## 2024-06-03 PROCEDURE — 80048 BASIC METABOLIC PNL TOTAL CA: CPT | Performed by: INTERNAL MEDICINE

## 2024-06-03 PROCEDURE — 36415 COLL VENOUS BLD VENIPUNCTURE: CPT | Mod: PO | Performed by: INTERNAL MEDICINE

## 2024-06-04 ENCOUNTER — HOSPITAL ENCOUNTER (OUTPATIENT)
Facility: HOSPITAL | Age: 69
Discharge: HOME | End: 2024-06-04
Attending: INTERNAL MEDICINE | Admitting: INTERNAL MEDICINE
Payer: MEDICARE

## 2024-06-04 VITALS
HEIGHT: 72 IN | DIASTOLIC BLOOD PRESSURE: 85 MMHG | HEART RATE: 66 BPM | RESPIRATION RATE: 15 BRPM | OXYGEN SATURATION: 96 % | TEMPERATURE: 98 F | WEIGHT: 250 LBS | SYSTOLIC BLOOD PRESSURE: 154 MMHG | BODY MASS INDEX: 33.86 KG/M2

## 2024-06-04 DIAGNOSIS — Z01.810 PRE-OPERATIVE CARDIOVASCULAR EXAMINATION: Primary | ICD-10-CM

## 2024-06-04 DIAGNOSIS — R94.39 ABNORMAL CARDIOVASCULAR STRESS TEST: ICD-10-CM

## 2024-06-04 DIAGNOSIS — I20.9 ANGINA, CLASS III: ICD-10-CM

## 2024-06-04 DIAGNOSIS — Z98.890 STATUS POST CARDIAC CATHETERIZATION: ICD-10-CM

## 2024-06-04 DIAGNOSIS — Z01.818 PRE-OP EVALUATION: ICD-10-CM

## 2024-06-04 LAB
POC ACTIVATED CLOTTING TIME K: 250 SEC (ref 74–137)
POC ACTIVATED CLOTTING TIME K: 304 SEC (ref 74–137)
POCT GLUCOSE: 141 MG/DL (ref 70–110)
SAMPLE: ABNORMAL
SAMPLE: ABNORMAL

## 2024-06-04 PROCEDURE — 63600175 PHARM REV CODE 636 W HCPCS: Performed by: INTERNAL MEDICINE

## 2024-06-04 PROCEDURE — 92928 PRQ TCAT PLMT NTRAC ST 1 LES: CPT | Mod: LD,,, | Performed by: INTERNAL MEDICINE

## 2024-06-04 PROCEDURE — 99900035 HC TECH TIME PER 15 MIN (STAT)

## 2024-06-04 PROCEDURE — 92978 ENDOLUMINL IVUS OCT C 1ST: CPT | Mod: 26,LD,, | Performed by: INTERNAL MEDICINE

## 2024-06-04 PROCEDURE — 25000003 PHARM REV CODE 250: Performed by: INTERNAL MEDICINE

## 2024-06-04 PROCEDURE — C9600 PERC DRUG-EL COR STENT SING: HCPCS | Mod: LD | Performed by: INTERNAL MEDICINE

## 2024-06-04 PROCEDURE — 85347 COAGULATION TIME ACTIVATED: CPT | Performed by: INTERNAL MEDICINE

## 2024-06-04 PROCEDURE — C1887 CATHETER, GUIDING: HCPCS | Performed by: INTERNAL MEDICINE

## 2024-06-04 PROCEDURE — 99152 MOD SED SAME PHYS/QHP 5/>YRS: CPT | Performed by: INTERNAL MEDICINE

## 2024-06-04 PROCEDURE — 93458 L HRT ARTERY/VENTRICLE ANGIO: CPT | Mod: 26,XU,, | Performed by: INTERNAL MEDICINE

## 2024-06-04 PROCEDURE — C1894 INTRO/SHEATH, NON-LASER: HCPCS | Performed by: INTERNAL MEDICINE

## 2024-06-04 PROCEDURE — 27201423 OPTIME MED/SURG SUP & DEVICES STERILE SUPPLY: Performed by: INTERNAL MEDICINE

## 2024-06-04 PROCEDURE — 99152 MOD SED SAME PHYS/QHP 5/>YRS: CPT | Mod: ,,, | Performed by: INTERNAL MEDICINE

## 2024-06-04 PROCEDURE — 93458 L HRT ARTERY/VENTRICLE ANGIO: CPT | Mod: XU | Performed by: INTERNAL MEDICINE

## 2024-06-04 PROCEDURE — 99153 MOD SED SAME PHYS/QHP EA: CPT | Performed by: INTERNAL MEDICINE

## 2024-06-04 PROCEDURE — 93010 ELECTROCARDIOGRAM REPORT: CPT | Mod: ,,, | Performed by: INTERNAL MEDICINE

## 2024-06-04 PROCEDURE — C1769 GUIDE WIRE: HCPCS | Performed by: INTERNAL MEDICINE

## 2024-06-04 PROCEDURE — C1753 CATH, INTRAVAS ULTRASOUND: HCPCS | Performed by: INTERNAL MEDICINE

## 2024-06-04 PROCEDURE — 92978 ENDOLUMINL IVUS OCT C 1ST: CPT | Mod: LD | Performed by: INTERNAL MEDICINE

## 2024-06-04 PROCEDURE — C1725 CATH, TRANSLUMIN NON-LASER: HCPCS | Performed by: INTERNAL MEDICINE

## 2024-06-04 PROCEDURE — 93005 ELECTROCARDIOGRAM TRACING: CPT | Mod: 59

## 2024-06-04 PROCEDURE — C1874 STENT, COATED/COV W/DEL SYS: HCPCS | Performed by: INTERNAL MEDICINE

## 2024-06-04 PROCEDURE — 25500020 PHARM REV CODE 255: Performed by: INTERNAL MEDICINE

## 2024-06-04 DEVICE — EVEROLIMUS-ELUTING PLATINUM CHROMIUM CORONARY STENT SYSTEM
Type: IMPLANTABLE DEVICE | Site: CORONARY | Status: FUNCTIONAL
Brand: SYNERGY™ XD

## 2024-06-04 RX ORDER — VERAPAMIL HYDROCHLORIDE 2.5 MG/ML
INJECTION, SOLUTION INTRAVENOUS
Status: DISCONTINUED | OUTPATIENT
Start: 2024-06-04 | End: 2024-06-04 | Stop reason: HOSPADM

## 2024-06-04 RX ORDER — IODIXANOL 320 MG/ML
INJECTION, SOLUTION INTRAVASCULAR
Status: DISCONTINUED | OUTPATIENT
Start: 2024-06-04 | End: 2024-06-04 | Stop reason: HOSPADM

## 2024-06-04 RX ORDER — MIDAZOLAM HYDROCHLORIDE 1 MG/ML
INJECTION, SOLUTION INTRAMUSCULAR; INTRAVENOUS
Status: DISCONTINUED | OUTPATIENT
Start: 2024-06-04 | End: 2024-06-04 | Stop reason: HOSPADM

## 2024-06-04 RX ORDER — LIDOCAINE HYDROCHLORIDE 10 MG/ML
INJECTION, SOLUTION EPIDURAL; INFILTRATION; INTRACAUDAL; PERINEURAL
Status: DISCONTINUED | OUTPATIENT
Start: 2024-06-04 | End: 2024-06-04 | Stop reason: HOSPADM

## 2024-06-04 RX ORDER — HEPARIN SODIUM 200 [USP'U]/100ML
INJECTION, SOLUTION INTRAVENOUS
Status: DISCONTINUED | OUTPATIENT
Start: 2024-06-04 | End: 2024-06-04 | Stop reason: HOSPADM

## 2024-06-04 RX ORDER — HEPARIN SODIUM 1000 [USP'U]/ML
INJECTION, SOLUTION INTRAVENOUS; SUBCUTANEOUS
Status: DISCONTINUED | OUTPATIENT
Start: 2024-06-04 | End: 2024-06-04 | Stop reason: HOSPADM

## 2024-06-04 RX ORDER — ASPIRIN 81 MG/1
81 TABLET ORAL DAILY
Status: DISCONTINUED | OUTPATIENT
Start: 2024-06-04 | End: 2024-06-04 | Stop reason: HOSPADM

## 2024-06-04 RX ORDER — CLOPIDOGREL BISULFATE 75 MG/1
75 TABLET ORAL DAILY
Status: DISCONTINUED | OUTPATIENT
Start: 2024-06-04 | End: 2024-06-04 | Stop reason: HOSPADM

## 2024-06-04 RX ORDER — SODIUM CHLORIDE 9 MG/ML
INJECTION, SOLUTION INTRAVENOUS CONTINUOUS
Status: ACTIVE | OUTPATIENT
Start: 2024-06-04 | End: 2024-06-04

## 2024-06-04 RX ORDER — FENTANYL CITRATE 50 UG/ML
INJECTION, SOLUTION INTRAMUSCULAR; INTRAVENOUS
Status: DISCONTINUED | OUTPATIENT
Start: 2024-06-04 | End: 2024-06-04 | Stop reason: HOSPADM

## 2024-06-04 RX ADMIN — CLOPIDOGREL BISULFATE 75 MG: 75 TABLET ORAL at 11:06

## 2024-06-04 RX ADMIN — ASPIRIN 81 MG: 81 TABLET, COATED ORAL at 11:06

## 2024-06-04 RX ADMIN — SODIUM CHLORIDE: 0.9 INJECTION, SOLUTION INTRAVENOUS at 08:06

## 2024-06-04 NOTE — BRIEF OP NOTE
POST CATH NOTE    HPI:     s/p catheterization secondary to:  abnormal stress test, progressive angina    Cath Results:  Access: right radial with a 5-6 slender sheath  LM:  PATRICIA  3 flow.  0% stenosis  LAD: PATRICIA  3 flow.  Mid 99% stenosis, prox to mid segment diffuse calcified  50-60% stenosis  LCx:  PATRICIA  3 flow.  30-40% stenosis mid segment  RCA: PATRICIA  3 flow.  Mid RCA 20% stenosis.  Right dominant  LVgram: LVEDP 26 mm Hg    Intervention:    Status post successful PTCA and stent to prox/mid LAD with 4.0 x 20, 4.0 x 20 PRAVEENA in overlapping fashion post dilated with a 4.5 NC balloon at high pressure.  IVUS guided with excellent results.  We had to place the 2nd more proximal stent due to severe disease under IVUS with heavily calcification and significant plaque burden, the  placed initial stent  landing in the  severe diseased segments.     Closure device: Vasc band  Patient tolerated procedure well, no complications    Post Cath Exam:  BP (!) 147/78 (BP Location: Left arm)   Pulse 67   Temp 97.8 °F (36.6 °C) (Temporal)   Resp 16   Ht 6' (1.829 m)   Wt 113.4 kg (250 lb)   SpO2 97%   BMI 33.91 kg/m²     Assessment:    Severe single-vessel coronary artery disease as above   Status post successful PCI with 2 PRAVEENA with excellent results.  IVUS guided intervention    Plan:    Aspirin and Plavix for 12 months   High-intensity statin with LDL goal lower than 70   Maximize medical therapy

## 2024-06-04 NOTE — PLAN OF CARE
Remaining air removed from right radial vasc band. Gauze and tegaderm dressing applied c.d.i.   No drainage or shadowing noted. No bleeding or hematoma noted around site. +2 livan radial pulses palpated. Skin normal in color, warm to touch, < 3 sec cap refill.   Pt tolerated well.  Will continue to monitor pt.

## 2024-06-04 NOTE — DISCHARGE INSTRUCTIONS
Understanding Transradial Cardiac Catheterization    Cardiac catheterization (cardiac cath) is a common, non-surgical procedure. During the procedure, your doctor will insert a long, thin tube (catheter) into an artery and move it up into your heart. Transradial means the catheter is inserted into an artery in the wrist (the radial artery). This procedure can be used to diagnose and treat certain heart problems.  Why do I need a transradial cardiac cath?  You may need a cardiac cath if signs indicate a problem with your heart. These may include:  Symptoms of chest pain, tightness, or heaviness (known as angina). This is a common symptom of blocked heart arteries, known as coronary artery disease.  Symptoms of weakness, dizziness, trouble breathing, or swollen legs or feet. These may be symptoms of a problem with a heart valve or the heart muscle.  Other test results show heart problems. Tests may include stress tests, heart scans, and echocardiography.  During a cardiac cath, your doctor can see the condition of the coronary arteries and heart valves. He or she can also check how well the heart pumps and the flow of blood through the heart. Your doctor can also measure pressures and take blood samples. And, if needed, he or she can open blocked arteries. This can help reduce symptoms of angina.  Cardiac cath is often done using a catheter inserted into an artery in the groin. During transradial cardiac cath, the catheter is inserted into an artery in the wrist. This can mean less bleeding and a faster recovery. Some people may have blockages in the groin arteries as well as in the heart arteries, making it difficult to reach the heart. The transradial approach can be used to get around this problem.   What happens during a transradial cardiac cath?  The procedure is done in the hospital or a surgery center. First, an IV line is put in your arm or hand to deliver fluids and medicines. You will likely be given  medicine to relax you and make you drowsy. When the procedure begins:  You lie on an X-ray table.  The skin over the insertion site in your wrist is numbed.  The doctor makes a tiny puncture or incision into the artery in the wrist. He or she then inserts a catheter and threads it through the blood vessel into your heart.    The doctor may inject a contrast fluid through the catheter into the arteries. This fluid makes the arteries show up better on X-rays.  Tests may be done to check the condition of your heart and arteries. If needed, the doctor can clear blockages in the arteries or do other repairs.  When the doctor is finished, he or she will remove the catheter and put direct pressure on the site to prevent bleeding.  You will stay for a time to recover, and then go home.  What are the risks of transradial cardiac cath?  These include:  Bleeding, bruising, infection, or blood clots  Damage to the radial artery that may cause injury to the hand  Allergic reaction to the contrast fluid  Abnormal heartbeat (arrhythmia)  Damage to blood vessels or tissues  Kidney damage or failure  The need for emergency heart surgery  Heart attack, stroke, or death  Date Last Reviewed: 5/1/2016  © 5472-6403 [email or web address]. [address] [place] [address]. All rights reserved. This information is not intended as a substitute for professional medical care. Always follow your healthcare professional's instructions.Discharge Instructions:    Do not drive a car, operate heavy equipment, care for a young child, etc for the next 12-24 hours.   Avoid drinking alcohol for 24 hours.  Do not make any important decisions for 24 hours.    Drink fluids to keep hydrated. Resume your usual diet as tolerated.     Rest for today then activity as tolerated.   Do not lift anything over 5 pounds for the first 3 days after procedure.    Remove dressing tomorrow then may shower with warm soapy water. Do not scrub site. Pat dry.    May apply bandaid for 2 days.  No tub baths.  Do not submerge wound in water for 3 days.     Call MD for any unrelieved pain, excessive nausea or vomiting, redness around site, bleeding, or pus or foul smelling drainage, or any other questions or concerns.    Go to the ER for any difficulty breathing or chest pain.      If site swells or bleeds, hold direct pressure to area for 10 full minutes.   If site continues to bleed, continue to hold pressure to site and have someone bring you to the ER.      Follow any additional instructions given to you by MD.      Call MD to schedule a follow up appointment, or follow up as instructed.

## 2024-06-04 NOTE — Clinical Note
All wires were removed.  DBE recrods received from Dr Gonzalez    He saw extensive erythema and nodularity in the TI and took biopsies.  ?if Crohn's.    Please get me pathology results asap to review    Thanks  kj

## 2024-06-04 NOTE — INTERVAL H&P NOTE
The patient has been examined and the H&P has been reviewed:    I concur with the findings and changes have been noted since the H&P was written: stress test with significant ischemia.  Still with recurrent angina.  Plan for coronary angiogram.  Tolerating aspirin and Plavix    Anesthesia/Surgery risks, benefits and alternative options discussed and understood by patient/family.          There are no hospital problems to display for this patient.

## 2024-06-04 NOTE — PLAN OF CARE
2 mL of air removed from radial vasc band.  No hematoma or bleeding noted.  +2 livan radial pulses palpated. Skin normal in color, warm to touch, < 3 sec cap refill.   Will continue to monitor pt.

## 2024-06-04 NOTE — PLAN OF CARE
Patient discharged to home as ordered after recovery per Dr. Noble.     All discharge instructions, printed materials given.    Patient instructed on follow-up appointment as ordered.  Patient verbalized understanding and agreement with all discharge instructions given. Pt is AAOx3, VSS, denies any pain.  Right radial gauze and tegaderm dressing c.d.i. No bleeding or hematoma noted.  Skin normal in color and warm to touch. Palpated bilateral radial, pedal and post tib pulses.  Pt up to bathroom and voided without difficulty.  Pt ate 100% of his ordered lunch. No n/v. Pt got dressed and moving around without difficulty and no distress noted.  Pt in w/c.  Left hand 20 gauge iv dc'd as ordered with tip intact. rebecca and koban dressing applied c.d.i.  Pt discharged home via wheelchair to private vehicle by pt's spouse.

## 2024-06-04 NOTE — DISCHARGE SUMMARY
[facility] - Cath Lab (Hospital)  Discharge Note  Short Stay    Procedure(s) (LRB):  Left heart cath (Left)  ANGIOGRAM, CORONARY ARTERY (N/A)  PTCA, Single Vessel  Percutaneous coronary intervention (N/A)  IVUS, Coronary      OUTCOME: Patient tolerated treatment/procedure well without complication and is now ready for discharge.    POST CATH NOTE     HPI:      s/p catheterization secondary to:  abnormal stress test, progressive angina     Cath Results:  Access: right radial with a 5-6 slender sheath  LM:  PATRICIA  3 flow.  0% stenosis  LAD: PATRICIA  3 flow.  Mid 99% stenosis, prox to mid segment diffuse calcified  50-60% stenosis  LCx:  PATRICIA  3 flow.  30-40% stenosis mid segment  RCA: PATRICIA  3 flow.  Mid RCA 20% stenosis.  Right dominant  LVgram: LVEDP 26 mm Hg     Intervention:    Status post successful PTCA and stent to prox/mid LAD with 4.0 x 20, 4.0 x 20 PRAVEENA in overlapping fashion post dilated with a 4.5 NC balloon at high pressure.  IVUS guided with excellent results.  We had to place the 2nd more proximal stent due to severe disease under IVUS with heavily calcification and significant plaque burden, the  placed initial stent  landing in the  severe diseased segments.      Closure device: Vasc band  Patient tolerated procedure well, no complications     Post Cath Exam:  BP (!) 147/78 (BP Location: Left arm)   Pulse 67   Temp 97.8 °F (36.6 °C) (Temporal)   Resp 16   Ht 6' (1.829 m)   Wt 113.4 kg (250 lb)   SpO2 97%   BMI 33.91 kg/m²      Assessment:    Severe single-vessel coronary artery disease as above   Status post successful PCI with 2 PRAVEENA with excellent results.  IVUS guided intervention     Plan:    Aspirin and Plavix for 12 months   High-intensity statin with LDL goal lower than 70   Maximize medical therapy              DISPOSITION: Home or Self Care    FINAL DIAGNOSIS:  <principal problem not specified>    FOLLOWUP: In clinic    DISCHARGE INSTRUCTIONS:    Discharge Procedure Orders   CBC W/ AUTO  DIFFERENTIAL   Standing Status: Future Number of Occurrences: 1 Standing Exp. Date: 08/26/25     BASIC METABOLIC PANEL   Standing Status: Future Number of Occurrences: 1 Standing Exp. Date: 08/26/25        TIME SPENT ON DISCHARGE: 25 minutes

## 2024-06-04 NOTE — PLAN OF CARE
Patient transferred to recovery cath lab slot 3 via stretcher with side rails up x2 .  Pt AAO X3 and able to follow commands. Pt is stable when connecting to cardiac monitors.  VSS. Right radial vasc band in place with 12ml of air in band c.d.i. no bleeding or hematoma noted. +2 livan radial pulses palpated. +2 livan pedal pulses.  Skin normal in color and warm to touch, <3 sec cap refill.  Fall risk precautions given and patient acknowledges.  AIDET completed to pt.  Will continue to monitor patient.  Updated pt's spouse in sx waiting room.

## 2024-06-05 LAB
OHS QRS DURATION: 90 MS
OHS QTC CALCULATION: 386 MS

## 2024-06-06 ENCOUNTER — PATIENT MESSAGE (OUTPATIENT)
Dept: UROLOGY | Facility: CLINIC | Age: 69
End: 2024-06-06
Payer: MEDICARE

## 2024-06-06 ENCOUNTER — PATIENT MESSAGE (OUTPATIENT)
Dept: PRIMARY CARE CLINIC | Facility: CLINIC | Age: 69
End: 2024-06-06
Payer: MEDICARE

## 2024-06-06 DIAGNOSIS — E07.9 THYROID MASS: Primary | ICD-10-CM

## 2024-06-06 DIAGNOSIS — E11.9 CONTROLLED TYPE 2 DIABETES MELLITUS WITHOUT COMPLICATION, WITHOUT LONG-TERM CURRENT USE OF INSULIN: ICD-10-CM

## 2024-06-06 RX ORDER — METFORMIN HYDROCHLORIDE 1000 MG/1
1000 TABLET ORAL 2 TIMES DAILY WITH MEALS
Qty: 180 TABLET | Refills: 1 | Status: SHIPPED | OUTPATIENT
Start: 2024-06-06

## 2024-06-06 NOTE — TELEPHONE ENCOUNTER
Pt states his Thyroid FNA biopsy on May 21st resulted in the following finding for both thyroid nodes tested: Atypia Undetermined Significance (AUS). Im not sure if any follow-up is warranted. Also wanted to inform about his angiogram/catheterization procedure on 6/4/24

## 2024-06-06 NOTE — TELEPHONE ENCOUNTER
Refill Decision Note   Yuriy Jerome  is requesting a refill authorization.  Brief Assessment and Rationale for Refill:  Approve     Medication Therapy Plan: FLOS      Comments:     Note composed:11:25 AM 06/06/2024

## 2024-06-06 NOTE — TELEPHONE ENCOUNTER
Care Due:                  Date            Visit Type   Department     Provider  --------------------------------------------------------------------------------                                EP -                              PRIMARY      [facility] PRIMARY  Last Visit: 04-      CARE ([facility])   ROBERT Jerome                              EP -                              PRIMARY      [facility] PRIMARY  Next Visit: 10-      CARE ([facility])   ROBERT Jerome                                                            Last  Test          Frequency    Reason                     Performed    Due Date  --------------------------------------------------------------------------------    CMP.........  12 months..  rosuvastatin.............  09- 09-    Lipid Panel.  12 months..  rosuvastatin.............  09- 09-    [employer] Embedded Care Due Messages. Reference number: [id number].   6/06/2024 10:06:11 AM CDT

## 2024-06-07 ENCOUNTER — PATIENT MESSAGE (OUTPATIENT)
Dept: CARDIOLOGY | Facility: CLINIC | Age: 69
End: 2024-06-07
Payer: MEDICARE

## 2024-06-07 RX ORDER — FINASTERIDE 5 MG/1
5 TABLET, FILM COATED ORAL DAILY
Qty: 90 TABLET | Refills: 3 | Status: SHIPPED | OUTPATIENT
Start: 2024-06-07

## 2024-06-07 RX ORDER — MIRABEGRON 50 MG/1
1 TABLET, EXTENDED RELEASE ORAL DAILY
Qty: 90 TABLET | Refills: 3 | Status: SHIPPED | OUTPATIENT
Start: 2024-06-07

## 2024-06-07 NOTE — TELEPHONE ENCOUNTER
Pt states he is looking for a otolaryngologist that specializes in head and neck surgery. Pending referral

## 2024-06-07 NOTE — TELEPHONE ENCOUNTER
Spoke to patient. Advice patient unfortunally Dr. Noble schedule for next available is what shows on his portal. Patient was advice Alfredo JJ collaborates with Dr. Noble and schedule patient for 06/24/24. Patient verbalized understanding.     Lorraine GODFREY

## 2024-06-09 ENCOUNTER — PATIENT MESSAGE (OUTPATIENT)
Dept: CARDIOLOGY | Facility: CLINIC | Age: 69
End: 2024-06-09
Payer: MEDICARE

## 2024-06-10 LAB
OHS QRS DURATION: 94 MS
OHS QTC CALCULATION: 405 MS

## 2024-06-11 ENCOUNTER — TELEPHONE (OUTPATIENT)
Dept: CARDIOLOGY | Facility: CLINIC | Age: 69
End: 2024-06-11
Payer: MEDICARE

## 2024-06-11 NOTE — TELEPHONE ENCOUNTER
I  reached out  and spoke to patient ,    And we schedule him  an appointment to See   6-12-24      Patient confirmed appointment too.      Christie Titus (rita).                         Message from Kierra Bennett sent at 6/11/2024  3:01 PM CDT -----  Regarding: Call back  Contact: [phone]  Type:  Needs Medical Advice    Who Called: PT   Symptoms (please be specific): Chest Pain   How long has patient had these symptoms:  Last Sunday   Would the patient rather a call back or a response via [facility]? Call back   Best Call Back Number:  [phone]   Additional Information:

## 2024-06-12 ENCOUNTER — PATIENT MESSAGE (OUTPATIENT)
Dept: CARDIOLOGY | Facility: CLINIC | Age: 69
End: 2024-06-12

## 2024-06-12 ENCOUNTER — TELEPHONE (OUTPATIENT)
Dept: CARDIAC REHAB | Facility: CLINIC | Age: 69
End: 2024-06-12
Payer: MEDICARE

## 2024-06-12 ENCOUNTER — OFFICE VISIT (OUTPATIENT)
Dept: CARDIOLOGY | Facility: CLINIC | Age: 69
End: 2024-06-12
Payer: MEDICARE

## 2024-06-12 VITALS
BODY MASS INDEX: 33.27 KG/M2 | WEIGHT: 251 LBS | HEIGHT: 73 IN | DIASTOLIC BLOOD PRESSURE: 72 MMHG | OXYGEN SATURATION: 97 % | HEART RATE: 63 BPM | SYSTOLIC BLOOD PRESSURE: 116 MMHG

## 2024-06-12 DIAGNOSIS — R07.89 OTHER CHEST PAIN: ICD-10-CM

## 2024-06-12 DIAGNOSIS — Z95.5 STENTED CORONARY ARTERY: Primary | ICD-10-CM

## 2024-06-12 DIAGNOSIS — E66.9 OBESITY (BMI 35.0-39.9 WITHOUT COMORBIDITY): ICD-10-CM

## 2024-06-12 DIAGNOSIS — E11.69 TYPE 2 DIABETES MELLITUS WITH OTHER SPECIFIED COMPLICATION, WITHOUT LONG-TERM CURRENT USE OF INSULIN: ICD-10-CM

## 2024-06-12 DIAGNOSIS — I10 ESSENTIAL HYPERTENSION: ICD-10-CM

## 2024-06-12 DIAGNOSIS — G47.33 OBSTRUCTIVE SLEEP APNEA: ICD-10-CM

## 2024-06-12 DIAGNOSIS — E78.5 HYPERLIPIDEMIA, UNSPECIFIED HYPERLIPIDEMIA TYPE: ICD-10-CM

## 2024-06-12 LAB
OHS QRS DURATION: 88 MS
OHS QTC CALCULATION: 368 MS

## 2024-06-12 PROCEDURE — 3044F HG A1C LEVEL LT 7.0%: CPT | Mod: CPTII,S$GLB,, | Performed by: INTERNAL MEDICINE

## 2024-06-12 PROCEDURE — 1126F AMNT PAIN NOTED NONE PRSNT: CPT | Mod: CPTII,S$GLB,, | Performed by: INTERNAL MEDICINE

## 2024-06-12 PROCEDURE — 99999 PR PBB SHADOW E&M-EST. PATIENT-LVL III: CPT | Mod: PBBFAC,,, | Performed by: INTERNAL MEDICINE

## 2024-06-12 PROCEDURE — 1101F PT FALLS ASSESS-DOCD LE1/YR: CPT | Mod: CPTII,S$GLB,, | Performed by: INTERNAL MEDICINE

## 2024-06-12 PROCEDURE — 1159F MED LIST DOCD IN RCRD: CPT | Mod: CPTII,S$GLB,, | Performed by: INTERNAL MEDICINE

## 2024-06-12 PROCEDURE — 99214 OFFICE O/P EST MOD 30 MIN: CPT | Mod: 25,S$GLB,, | Performed by: INTERNAL MEDICINE

## 2024-06-12 PROCEDURE — 93000 ELECTROCARDIOGRAM COMPLETE: CPT | Mod: S$GLB,,, | Performed by: INTERNAL MEDICINE

## 2024-06-12 PROCEDURE — 3074F SYST BP LT 130 MM HG: CPT | Mod: CPTII,S$GLB,, | Performed by: INTERNAL MEDICINE

## 2024-06-12 PROCEDURE — 3078F DIAST BP <80 MM HG: CPT | Mod: CPTII,S$GLB,, | Performed by: INTERNAL MEDICINE

## 2024-06-12 PROCEDURE — 1160F RVW MEDS BY RX/DR IN RCRD: CPT | Mod: CPTII,S$GLB,, | Performed by: INTERNAL MEDICINE

## 2024-06-12 PROCEDURE — 3008F BODY MASS INDEX DOCD: CPT | Mod: CPTII,S$GLB,, | Performed by: INTERNAL MEDICINE

## 2024-06-12 PROCEDURE — 3288F FALL RISK ASSESSMENT DOCD: CPT | Mod: CPTII,S$GLB,, | Performed by: INTERNAL MEDICINE

## 2024-06-12 RX ORDER — NITROGLYCERIN 0.4 MG/1
0.4 TABLET SUBLINGUAL EVERY 5 MIN PRN
Qty: 20 TABLET | Refills: 12 | Status: SHIPPED | OUTPATIENT
Start: 2024-06-12 | End: 2024-07-12

## 2024-06-12 NOTE — TELEPHONE ENCOUNTER
Letter regarding Phase II cardiac rehab was sent to patient.  Will contact patient in 2 weeks to see if interested.  Also, information letter sent to [facility].  Carissa Villalpando RN  Cardiac Rehab Nurse

## 2024-06-12 NOTE — PROGRESS NOTES
Subjective:   @Patient ID:  Yuriy Jerome is a 68 y.o. male who presents for evaluation of chest pain      HPI:   June 12, 2024:  follow up.  Stress test abnormal for ischemia prompted left heart catheterization 99% stenosis in the prox/mid LAD segment required 2 PRAVEENA with excellent results post.  He is doing okay.  Chest pain has improved.  He does have occasional atypical left-sided pain that is concerning for some element of pericarditis.  Worse when he lays flat.  EKG today sinus rhythm without any ischemic changes        May 13, 2024:  Pleasant 68 years old gentleman here for Initial visit with me for chest pain and risk stratification.  Overall he is doing well.  Plan to have a prostate surgery.  For the last 2 weeks he noticed 3 episodes of chest tightness toward the left side.  It was all brought in by exertion.  He did not have similar symptoms before.  No significant shortness of breath, lower extremity edema, or PND.  BP is well-controlled as well as his lipids.    PMH significant for type 2 diabetes mellitus, HLP, HTN, and LACHO    SH: No tobacco abuse    FH:  Family history of valvular heart disease    Prior cardiovascular  Hx  --------------------------------   [facility] June 1, 2024  Access: right radial with a 5-6 slender sheath  LM:  PATRICIA  3 flow.  0% stenosis  LAD: PATRICIA  3 flow.  Mid 99% stenosis, prox to mid segment diffuse calcified  50-60% stenosis  LCx:  PATRICIA  3 flow.  30-40% stenosis mid segment  RCA: PATRICIA  3 flow.  Mid RCA 20% stenosis.  Right dominant  LVgram: LVEDP 26 mm Hg     Intervention:    Status post successful PTCA and stent to prox/mid LAD with 4.0 x 20, 4.0 x 20 PRAVEENA in overlapping fashion post dilated with a 4.5 NC balloon at high pressure.  IVUS guided with excellent results.  We had to place the 2nd more proximal stent due to severe disease under IVUS with heavily calcification and significant plaque burden, the  placed initial stent  landing in the severe diseased segments.       Closure device: Vasc band  Patient tolerated procedure well, no complications        Assessment:    Severe single-vessel coronary artery disease as above   Status post successful PCI with 2 PRAVEENA with excellent results.  IVUS guided intervention with the excellent results     Plan:    Aspirin and Plavix for 12 months   High-intensity statin with LDL goal lower than 70   Maximize medical therapy  - EKG May 2024 showed sinus rhythm with single PVCs.  No acute ischemic changes    - EKG May 13, 2022.  Sinus rhythm without any ischemic changes           Patient Active Problem List    Diagnosis Date Noted    Stented coronary artery 2024      Prox/mid LAD with 2 PRAVEENA 4.0 post dilated with 4.5 NC balloon      Morbid (severe) obesity due to excess calories 2023    Subluxation of tarsal joint of left foot 2022    Hyperlipidemia 12/15/2021    Essential hypertension 12/15/2021    Benign prostatic hyperplasia 12/15/2021    Obesity (BMI 35.0-39.9 without comorbidity) 12/15/2021    Type 2 diabetes mellitus with other specified complication, without long-term current use of insulin     Insomnia 2018    Obstructive sleep apnea 2016           Right Arm BP - Sittin/76  Left Arm BP - Sittin/72        LAST HbA1c  Lab Results   Component Value Date    HGBA1C 6.8 (H) 2024       Lipid panel  Lab Results   Component Value Date    CHOL 137 2023    CHOL 133 2022    CHOL 240 (H) 2011     Lab Results   Component Value Date    HDL 37 (L) 2023    HDL 41 2022    HDL 37 (L) 2011     Lab Results   Component Value Date    LDLCALC 65.2 2023    LDLCALC 49.6 (L) 2022    LDLCALC 148.0 2011     Lab Results   Component Value Date    TRIG 174 (H) 2023    TRIG 212 (H) 2022    TRIG 276 (H) 2011     Lab Results   Component Value Date    CHOLHDL 27.0 2023    CHOLHDL 30.8 2022    CHOLHDL 15.4 (L) 2011            Review of  Systems   Constitutional: Negative for chills and fever.   HENT:  Negative for hearing loss and nosebleeds.    Eyes:  Negative for blurred vision.   Cardiovascular:         As in HPI   Respiratory:  Negative for hemoptysis and shortness of breath.    Hematologic/Lymphatic: Negative for bleeding problem.   Skin:  Negative for itching.   Musculoskeletal:  Negative for falls.   Gastrointestinal:  Negative for abdominal pain and hematochezia.   Genitourinary:  Negative for hematuria.   Neurological:  Negative for dizziness and loss of balance.   Psychiatric/Behavioral:  Negative for altered mental status and depression.        Objective:   Physical Exam  Constitutional:       Appearance: He is well-developed. He is obese.   HENT:      Head: Normocephalic and atraumatic.   Eyes:      Conjunctiva/sclera: Conjunctivae normal.   Neck:      Vascular: No carotid bruit or JVD.   Cardiovascular:      Rate and Rhythm: Normal rate and regular rhythm.      Pulses:           Carotid pulses are 2+ on the right side and 2+ on the left side.       Radial pulses are 2+ on the right side and 2+ on the left side.      Heart sounds: Normal heart sounds. No murmur heard.     No friction rub. No gallop.   Pulmonary:      Effort: Pulmonary effort is normal. No respiratory distress.      Breath sounds: Normal breath sounds. No stridor. No wheezing.   Abdominal:      General: Abdomen is flat.      Palpations: Abdomen is soft.   Musculoskeletal:      Cervical back: Neck supple.   Skin:     General: Skin is warm and dry.   Neurological:      Mental Status: He is alert and oriented to person, place, and time.   Psychiatric:         Behavior: Behavior normal.         Assessment:     1. Stented coronary artery    2. Hyperlipidemia, unspecified hyperlipidemia type    3. Essential hypertension    4. Obesity (BMI 35.0-39.9 without comorbidity)    5. Type 2 diabetes mellitus with other specified complication, without long-term current use of insulin     6. Obstructive sleep apnea    7. Other chest pain          Plan:     Status successful PCI to prox/mid LAD for 99% stenosis.  Two PRAVEENA.    Reviewed the EKG today.  No concerning ischemic changes.  Some of his symptoms atypical.   Unlikely symptoms related to InStent restenosis /thrombosis, normal EKG.  We will go ahead and get stress echocardiogram.     Referred to cardiac rehab phase 2     Nitro p.r.n.    Have reviewed his EKG today.      Continue primary preventive measures with aspirin and statin.  LDL well-controlled    BP is well-controlled    Continue CPAP for LACHO   Three-month follow-up or sooner p.r.n.  Pertinent cardiac images and EKG reviewed independently.    Continue with current medical plan and lifestyle changes.  Return sooner for concerns or questions. If symptoms persist go to the ED  I have reviewed all pertinent data including patient's medical history in detail and updated the computerized patient record.     Orders Placed This Encounter   Procedures    Cardiac Rehab Phase II     Standing Status:   Future     Standing Expiration Date:   6/12/2025     Order Specific Question:   Department     Answer:   [facility] CARDIAC REHAB    IN OFFICE EKG 12-LEAD (to Muse)    Stress Echo Which stress agent will be used? Treadmill Exercise; Color Flow Doppler? No     Standing Status:   Future     Standing Expiration Date:   6/12/2025     Order Specific Question:   Which stress agent will be used?     Answer:   Treadmill Exercise     Order Specific Question:   Color Flow Doppler?     Answer:   No     Order Specific Question:   Release to patient     Answer:   Immediate       Follow up as scheduled.     He expressed verbal understanding and agreed with the plan    Patient's Medications   New Prescriptions    NITROGLYCERIN (NITROSTAT) 0.4 MG SL TABLET    Place 1 tablet (0.4 mg total) under the tongue every 5 (five) minutes as needed for Chest pain.   Previous Medications    ALPHA LIPOIC ACID 600 MG CAP    Take by  mouth.    AMLODIPINE (NORVASC) 5 MG TABLET    TAKE 1 TABLET(5 MG) BY MOUTH EVERY DAY    ASPIRIN (ECOTRIN) 81 MG EC TABLET    Take 81 mg by mouth once daily.    CLOPIDOGREL (PLAVIX) 75 MG TABLET    Take 1 tablet (75 mg total) by mouth once daily.    COQ10, UBIQUINOL, 200 MG CAP        FINASTERIDE (PROSCAR) 5 MG TABLET    Take 1 tablet (5 mg total) by mouth once daily.    FLUTICASONE PROPIONATE (FLONASE) 50 MCG/ACTUATION NASAL SPRAY    fluticasone propionate 50 mcg/actuation nasal spray,suspension    LOSARTAN-HYDROCHLOROTHIAZIDE 100-12.5 MG (HYZAAR) 100-12.5 MG TAB    TAKE 1 TABLET BY MOUTH EVERY DAY    METFORMIN (GLUCOPHAGE) 1000 MG TABLET    TAKE 1 TABLET(1000 MG) BY MOUTH TWICE DAILY WITH MEALS    MIRABEGRON (MYRBETRIQ) 50 MG TB24    Take 1 tablet (50 mg total) by mouth once daily.    MULTIVIT WITH MINERALS/LUTEIN (MULTIVITAMIN 50 PLUS ORAL)    Take by mouth.    OMEGA-3 FATTY ACIDS/FISH OIL (FISH OIL-OMEGA-3 FATTY ACIDS) 300-1,000 MG CAPSULE    Take by mouth once daily.    ROSUVASTATIN (CRESTOR) 20 MG TABLET    TAKE 1 TABLET(20 MG) BY MOUTH EVERY DAY    UNABLE TO FIND    osteobiflex   Modified Medications    No medications on file   Discontinued Medications    No medications on file

## 2024-06-12 NOTE — LETTER
June 12, 2024    Yuriy FELICIA Queenie  [address] [place] [address]             [facility]  [address]  [place] [address]  Phone: [phone]                                  [facility]   [address] [address] [facility]   [place] [address]  [phone]         [facility]   [address] [place] [address] [phone]         [facility]    [address]  [place] [address] [phone]   Re: Yuriy Jerome  Clinic number: [id number]    Dear Mr. Jerome:    You were recently admitted to an [facility] facility for cardiac (heart) problem.  Your physician has referred you to [facility].  Cardiac Rehab Phase 2 is an educational and exercise program, conducted in a outpatient setting, proven to help reduce your risk for recurrent heart events.    Cardiac rehab has two major parts:    1. Exercise training to help you achieve cardiovascular fitness while learning how to exercise safely and improve muscle strength and endurance.  Your exercise prescription will be based on the results of the cardiopulmonary stress test (CPX) which will be done before entering the program and at completion.  2. Education, counseling and training to help you understand your heart condition and find ways to reduce your risk of future heart problems.  The cardiac rehab team will help you learn how to cope with the stress of adjusting to a new lifestyle and to deal with your fears about the future.    Phase 2 is a 36-session program, meeting 3 times a week for 12 weeks.  Each session consists of an hour of exercise and half-hour dedicated to the educational topic of the day.  Class days vary per location.  Please contact your nearest facility for details.    Through cardiac rehab you will learn:  About your heart condition, medical therapies, and medication  Risk factors in y our lifestyle contributing to heart  disease  New strategies to modify your risk factors  About a healthy diet that can lower your blood cholesterol, control weight, help prevent or control high blood pressure, and diabetes  How to stop smoking  How to manage stress    If you are interested in getting started, call the [facility] of your choosing.     Sincerely,     [facility]

## 2024-06-20 ENCOUNTER — HOSPITAL ENCOUNTER (OUTPATIENT)
Dept: CARDIOLOGY | Facility: HOSPITAL | Age: 69
Discharge: HOME | End: 2024-06-20
Attending: INTERNAL MEDICINE
Payer: MEDICARE

## 2024-06-20 ENCOUNTER — OFFICE VISIT (OUTPATIENT)
Dept: OTOLARYNGOLOGY | Facility: CLINIC | Age: 69
End: 2024-06-20
Payer: MEDICARE

## 2024-06-20 VITALS
HEART RATE: 66 BPM | BODY MASS INDEX: 33.25 KG/M2 | WEIGHT: 252 LBS | DIASTOLIC BLOOD PRESSURE: 87 MMHG | SYSTOLIC BLOOD PRESSURE: 144 MMHG

## 2024-06-20 VITALS — WEIGHT: 251 LBS | BODY MASS INDEX: 33.27 KG/M2 | HEIGHT: 73 IN

## 2024-06-20 DIAGNOSIS — R07.89 OTHER CHEST PAIN: ICD-10-CM

## 2024-06-20 DIAGNOSIS — E04.1 THYROID NODULE: Primary | ICD-10-CM

## 2024-06-20 DIAGNOSIS — Z95.5 STENTED CORONARY ARTERY: ICD-10-CM

## 2024-06-20 LAB
BSA FOR ECHO PROCEDURE: 2.42 M2
CV STRESS BASE HR: 64 BPM
DIASTOLIC BLOOD PRESSURE: 74 MMHG
OHS CV CPX 1 MINUTE RECOVERY HEART RATE: 112 BPM
OHS CV CPX 85 PERCENT MAX PREDICTED HEART RATE MALE: 129
OHS CV CPX ESTIMATED METS: 9
OHS CV CPX MAX PREDICTED HEART RATE: 152
OHS CV CPX PATIENT IS FEMALE: 0
OHS CV CPX PATIENT IS MALE: 1
OHS CV CPX PEAK DIASTOLIC BLOOD PRESSURE: 70 MMHG
OHS CV CPX PEAK HEAR RATE: 141 BPM
OHS CV CPX PEAK RATE PRESSURE PRODUCT: NORMAL
OHS CV CPX PEAK SYSTOLIC BLOOD PRESSURE: 214 MMHG
OHS CV CPX PERCENT MAX PREDICTED HEART RATE ACHIEVED: 93
OHS CV CPX RATE PRESSURE PRODUCT PRESENTING: 8448
STRESS ECHO POST EXERCISE DUR MIN: 7 MINUTES
STRESS ECHO POST EXERCISE DUR SEC: 22 SECONDS
SYSTOLIC BLOOD PRESSURE: 132 MMHG

## 2024-06-20 PROCEDURE — 3077F SYST BP >= 140 MM HG: CPT | Mod: CPTII,S$GLB,, | Performed by: OTOLARYNGOLOGY

## 2024-06-20 PROCEDURE — 99999 PR PBB SHADOW E&M-EST. PATIENT-LVL III: CPT | Mod: PBBFAC,,, | Performed by: OTOLARYNGOLOGY

## 2024-06-20 PROCEDURE — 1159F MED LIST DOCD IN RCRD: CPT | Mod: CPTII,S$GLB,, | Performed by: OTOLARYNGOLOGY

## 2024-06-20 PROCEDURE — 3044F HG A1C LEVEL LT 7.0%: CPT | Mod: CPTII,S$GLB,, | Performed by: OTOLARYNGOLOGY

## 2024-06-20 PROCEDURE — 3079F DIAST BP 80-89 MM HG: CPT | Mod: CPTII,S$GLB,, | Performed by: OTOLARYNGOLOGY

## 2024-06-20 PROCEDURE — 3008F BODY MASS INDEX DOCD: CPT | Mod: CPTII,S$GLB,, | Performed by: OTOLARYNGOLOGY

## 2024-06-20 PROCEDURE — 99204 OFFICE O/P NEW MOD 45 MIN: CPT | Mod: S$GLB,,, | Performed by: OTOLARYNGOLOGY

## 2024-06-20 PROCEDURE — 1126F AMNT PAIN NOTED NONE PRSNT: CPT | Mod: CPTII,S$GLB,, | Performed by: OTOLARYNGOLOGY

## 2024-06-20 PROCEDURE — 93351 STRESS TTE COMPLETE: CPT

## 2024-06-20 NOTE — ASSESSMENT & PLAN NOTE
Stable on ultrasound since 2017.  Previous biopsies benign.  More recent biopsies of right thyroid nodule and isthmus nodule consistent with FLUS.  Will send for genetic testing.

## 2024-06-20 NOTE — PROGRESS NOTES
Chief Complaint   Patient presents with    Thyroid Mass       HPI   68 y.o. male presents for evaluation of multiple thyroid nodules.  He reports a history of a right-sided thyroid nodule dating back to 2017.  He presents an ultrasound from that time.  In comparing the dimensions of the right-sided thyroid nodule, it appears that it is stable over time.  Recent FNA of isthmus nodule and right thyroid nodule revealed FLUS.  Biopsy of the right thyroid nodule in 2017 was benign.  No compressive symptoms.  No family history.  No radiation exposure.    Review of Systems   Constitutional: Negative for fatigue and unexpected weight change.   HENT: Per HPI.  Eyes: Negative for visual disturbance.   Respiratory: Negative for shortness of breath, hemoptysis   Cardiovascular: Negative for chest pain and palpitations.   Musculoskeletal: Negative for decreased ROM, back pain.   Skin: Negative for rash, sunburn, itching.   Neurological: Negative for dizziness and seizures.   Hematological: Negative for adenopathy. Does not bruise/bleed easily.   Endocrine: Negative for rapid weight loss/weight gain, heat/cold intolerance.     Past Medical History   Patient Active Problem List   Diagnosis    Obstructive sleep apnea    Insomnia    Hyperlipidemia    Essential hypertension    Benign prostatic hyperplasia    Obesity (BMI 35.0-39.9 without comorbidity)    Type 2 diabetes mellitus with other specified complication, without long-term current use of insulin    Subluxation of tarsal joint of left foot    Morbid (severe) obesity due to excess calories    Stented coronary artery    Thyroid nodule           Past Surgical History   Past Surgical History:   Procedure Laterality Date    ADENOIDECTOMY  2001    UP surgery    APPENDECTOMY  2005    BICEPS TENDON REPAIR Left 12/28/2021    CORONARY ANGIOGRAPHY N/A 6/4/2024    Procedure: ANGIOGRAM, CORONARY ARTERY;  Surgeon: Raji Noble MD;  Location: [facility] CATH LAB/EP;  Service: Cardiology;   Laterality: N/A;    EYE SURGERY  2017    Cararact surgery    FOOT SURGERY Left 05/2017    subtalar implant, 6 weeks later removed due to failure    FOOT SURGERY Left 05/19/2022    HERNIA REPAIR      DOUBLE age 8    IVUS, CORONARY  6/4/2024    Procedure: IVUS, Coronary;  Surgeon: Raji Noble MD;  Location: [facility] CATH LAB/EP;  Service: Cardiology;;    JOINT REPLACEMENT  2009    Bilateral knee replacement    KNEE SURGERY Bilateral 05/2011    TKR, had ligament replacement in right , meniscus repair both knees    LAMINECTOMY  1989    spinal    LEFT HEART CATHETERIZATION Left 6/4/2024    Procedure: Left heart cath;  Surgeon: Raji Noble MD;  Location: [facility] CATH LAB/EP;  Service: Cardiology;  Laterality: Left;    PERCUTANEOUS CORONARY INTERVENTION, ARTERY N/A 6/4/2024    Procedure: Percutaneous coronary intervention;  Surgeon: Raji Noble MD;  Location: [facility] CATH LAB/EP;  Service: Cardiology;  Laterality: N/A;    PTCA, SINGLE VESSEL  6/4/2024    Procedure: PTCA, Single Vessel;  Surgeon: Raji Noble MD;  Location: [facility] CATH LAB/EP;  Service: Cardiology;;    SPINE SURGERY  1991    Triple laminectomy    TONSILLECTOMY  2001    UPPP surgery    UVULOPALATOPHARYNGOPLASTY  2007    VASECTOMY  1985         Family History   Family History   Problem Relation Name Age of Onset    Arthritis Mother Adenike     Asthma Father Pat     Heart disease Father Pat     Cancer Maternal Grandfather Daron     Cancer Maternal Grandmother Erin     Drug abuse Brother Anshul     Drug abuse Brother Brucedavid     Drug abuse Brother Kapil     Drug abuse Brother Mehul            Social History   .  Social History     Socioeconomic History    Marital status:    Tobacco Use    Smoking status: Never    Smokeless tobacco: Never   Substance and Sexual Activity    Alcohol use: Yes     Alcohol/week: 11.0 standard drinks of alcohol     Types: 7 Glasses of wine, 4 Cans of beer per week    Drug use: No    Sexual activity: Yes      Partners: Female     Birth control/protection: Other-see comments     Comment: Vasectomy     Social Determinants of Health     Financial Resource Strain: Low Risk  (6/11/2024)    Overall Financial Resource Strain (CARDIA)     Difficulty of Paying Living Expenses: Not hard at all   Food Insecurity: No Food Insecurity (6/11/2024)    Hunger Vital Sign     Worried About Running Out of Food in the Last Year: Never true     Ran Out of Food in the Last Year: Never true   Transportation Needs: No Transportation Needs (5/9/2024)    PRAPARE - Transportation     Lack of Transportation (Medical): No     Lack of Transportation (Non-Medical): No   Physical Activity: Sufficiently Active (6/11/2024)    Exercise Vital Sign     Days of Exercise per Week: 3 days     Minutes of Exercise per Session: 60 min   Stress: Stress Concern Present (6/11/2024)    [employer] - Occupational Stress Questionnaire     Feeling of Stress : Very much   Housing Stability: Unknown (6/11/2024)    Housing Stability Vital Sign     Unable to Pay for Housing in the Last Year: No         Allergies   Review of patient's allergies indicates:   Allergen Reactions    Hay fever and allergy relief Other (See Comments)           Physical Exam     Vitals:    06/20/24 0847   BP: (!) 144/87   Pulse: 66         Body mass index is 33.25 kg/m².      General: AOx3, NAD   Respiratory:  Symmetric chest rise, normal effort  Oral Cavity:  Oral Tongue mobile, no lesions noted. Hard Palate WNL. No buccal or FOM lesions.  Oropharynx:  No masses/lesions of the posterior pharyngeal wall. Tonsillar fossa without lesions. Soft palate without masses.  Uvula surgically absent.   Neck: No scars.  No cervical lymphadenopathy, thyromegaly or thyroid nodules.  Normal range of motion.    Face: House Brackmann I bilaterally.     US reviewed/    Assessment/Plan  Problem List Items Addressed This Visit          Endocrine    Thyroid nodule - Primary     Stable on  ultrasound since 2017.  Previous biopsies benign.  More recent biopsies of right thyroid nodule and isthmus nodule consistent with FLUS.  Will send for genetic testing.                    Answers submitted by the patient for this visit:  Review of Symptoms Questionnaire  (Submitted on 6/17/2024)  Fatigue (Tiredness)?: Yes  hearing loss: Yes  sinus pressure : Yes  Sinus infection(s)?: Yes  None of these : Yes  Sleep Apnea?: Yes  chest pain: Yes  diarrhea: Yes  Difficulty urinating?: Yes  Urinating too frequently?: Yes  back pain: Yes  neck pain: Yes  None of these : Yes  Seasonal Allergies?: Yes  None of these : Yes  None of these: Yes  None of these: Yes  sleep disturbance: Yes  iewed.

## 2024-06-20 NOTE — PROGRESS NOTES
Please call the patient with the results stress test results is okay.  No major abnormalities noted  Follow up with the cardiac rehab and as scheduled  Sincerely,  Raji Noble MD.   Interventional Cardiologist  [facility], [place]

## 2024-06-21 ENCOUNTER — TELEPHONE (OUTPATIENT)
Dept: CARDIOLOGY | Facility: CLINIC | Age: 69
End: 2024-06-21
Payer: MEDICARE

## 2024-06-21 ENCOUNTER — PATIENT MESSAGE (OUTPATIENT)
Dept: CARDIOLOGY | Facility: CLINIC | Age: 69
End: 2024-06-21
Payer: MEDICARE

## 2024-06-21 NOTE — TELEPHONE ENCOUNTER
I reached out to Mr. Jerome this morning with his stress results,   And I also forward him  Test results, though patient portal.  Patient voiced understanding no further questions or concerns.        Nw                        ----- Message from Raji Noble MD sent at 6/20/2024  5:33 PM CDT -----  Please call the patient with the results stress test results is okay.  No major abnormalities noted  Follow up with the cardiac rehab and as scheduled  Sincerely,  Raji Noble MD.   Interventional Cardiologist  [facility], [place]

## 2024-06-27 ENCOUNTER — TELEPHONE (OUTPATIENT)
Dept: CARDIAC REHAB | Facility: CLINIC | Age: 69
End: 2024-06-27

## 2024-06-28 DIAGNOSIS — Z95.5 STENTED CORONARY ARTERY: Primary | ICD-10-CM

## 2024-06-28 DIAGNOSIS — E78.00 PURE HYPERCHOLESTEROLEMIA: ICD-10-CM

## 2024-06-28 DIAGNOSIS — R73.9 BLOOD GLUCOSE ELEVATED: ICD-10-CM

## 2024-06-28 DIAGNOSIS — I25.10 CORONARY ARTERY DISEASE, UNSPECIFIED VESSEL OR LESION TYPE, UNSPECIFIED WHETHER ANGINA PRESENT, UNSPECIFIED WHETHER NATIVE OR TRANSPLANTED HEART: ICD-10-CM

## 2024-07-18 ENCOUNTER — PATIENT MESSAGE (OUTPATIENT)
Dept: OTOLARYNGOLOGY | Facility: CLINIC | Age: 69
End: 2024-07-18
Payer: MEDICARE

## 2024-08-06 ENCOUNTER — HOSPITAL ENCOUNTER (OUTPATIENT)
Dept: CARDIOLOGY | Facility: HOSPITAL | Age: 69
Discharge: HOME | End: 2024-08-06
Attending: INTERNAL MEDICINE
Payer: MEDICARE

## 2024-08-06 ENCOUNTER — TELEPHONE (OUTPATIENT)
Dept: CARDIAC REHAB | Facility: CLINIC | Age: 69
End: 2024-08-06
Payer: MEDICARE

## 2024-08-06 VITALS
WEIGHT: 249.31 LBS | DIASTOLIC BLOOD PRESSURE: 82 MMHG | SYSTOLIC BLOOD PRESSURE: 130 MMHG | HEIGHT: 73 IN | BODY MASS INDEX: 33.04 KG/M2 | HEART RATE: 64 BPM

## 2024-08-06 DIAGNOSIS — I25.10 CORONARY ARTERY DISEASE, UNSPECIFIED VESSEL OR LESION TYPE, UNSPECIFIED WHETHER ANGINA PRESENT, UNSPECIFIED WHETHER NATIVE OR TRANSPLANTED HEART: ICD-10-CM

## 2024-08-06 DIAGNOSIS — Z95.5 STENTED CORONARY ARTERY: ICD-10-CM

## 2024-08-06 LAB
CV STRESS BASE HR: 64 BPM
DIASTOLIC BLOOD PRESSURE: 82 MMHG
OHS CV CPX 1 MINUTE RECOVERY HEART RATE: 109 BPM
OHS CV CPX 85 PERCENT MAX PREDICTED HEART RATE MALE: 129
OHS CV CPX ABDOMINAL GIRTH: 48.5 CM
OHS CV CPX ANAEROBIC THRESHOLD DIASTOLIC BLOOD PRESSURE: 71 MMHG
OHS CV CPX ANAEROBIC THRESHOLD HEART RATE: 98
OHS CV CPX ANAEROBIC THRESHOLD RATE PRESSURE PRODUCT: NORMAL
OHS CV CPX ANAEROBIC THRESHOLD SYSTOLIC BLOOD PRESSURE: 142
OHS CV CPX DATA GRADE - AT: 8.4
OHS CV CPX DATA GRADE - PEAK: 15.5
OHS CV CPX DATA O2 SAT - PEAK: 96
OHS CV CPX DATA O2 SAT - REST: 97
OHS CV CPX DATA SPEED - AT: 2.6
OHS CV CPX DATA SPEED - PEAK: 3.9
OHS CV CPX DATA TIME - AT: 4.17
OHS CV CPX DATA TIME - PEAK: 7.33
OHS CV CPX DATA VE/VCO2 - AT: 43
OHS CV CPX DATA VE/VCO2 - PEAK: 43
OHS CV CPX DATA VE/VO2 - AT: 31
OHS CV CPX DATA VE/VO2 - PEAK: 41
OHS CV CPX DATA VO2 - AT: 17
OHS CV CPX DATA VO2 - PEAK: 23.1
OHS CV CPX DATA VO2 - REST: 4
OHS CV CPX ESTIMATED METS: 12
OHS CV CPX FEV1/FVC: 0.93
OHS CV CPX FORCED EXPIRATORY VOLUME: 3.31
OHS CV CPX FORCED VITAL CAPACITY (FVC): 3.57
OHS CV CPX HIGHEST VO: 33.1
OHS CV CPX MAX PREDICTED HEART RATE: 152
OHS CV CPX MAXIMAL VOLUNTARY VENTILATION (MVV) PREDICTED: 132.4
OHS CV CPX MAXIMAL VOLUNTARY VENTILATION (MVV): 139
OHS CV CPX MAXIUMUM EXERCISE VENTILATION (VE MAX): 102.9
OHS CV CPX PATIENT AGE: 68
OHS CV CPX PATIENT HEIGHT IN: 73
OHS CV CPX PATIENT IS FEMALE AGE 11-19: 0
OHS CV CPX PATIENT IS FEMALE AGE GREATER THAN 19: 0
OHS CV CPX PATIENT IS FEMALE AGE LESS THAN 11: 0
OHS CV CPX PATIENT IS FEMALE: 0
OHS CV CPX PATIENT IS MALE AGE 11-25: 0
OHS CV CPX PATIENT IS MALE AGE GREATER THAN 25: 1
OHS CV CPX PATIENT IS MALE AGE LESS THAN 11: 0
OHS CV CPX PATIENT IS MALE GREATER THAN 18: 1
OHS CV CPX PATIENT IS MALE LESS THAN OR EQUAL TO 18: 0
OHS CV CPX PATIENT IS MALE: 1
OHS CV CPX PATIENT WEIGHT RETURNED IN OZ: 3989.44
OHS CV CPX PEAK DIASTOLIC BLOOD PRESSURE: 90 MMHG
OHS CV CPX PEAK HEAR RATE: 134 BPM
OHS CV CPX PEAK RATE PRESSURE PRODUCT: NORMAL
OHS CV CPX PEAK SYSTOLIC BLOOD PRESSURE: 174 MMHG
OHS CV CPX PERCENT BODY FAT: 24.3
OHS CV CPX PERCENT MAX PREDICTED HEART RATE ACHIEVED: 88
OHS CV CPX PREDICTED VO2: 33.1 ML/KG/MIN
OHS CV CPX RATE PRESSURE PRODUCT PRESENTING: 8320
OHS CV CPX REST PET CO2: 23
OHS CV CPX VE/VCO2 SLOPE: 39
STRESS ECHO POST EXERCISE DUR MIN: 7 MINUTES
STRESS ECHO POST EXERCISE DUR SEC: 20 SECONDS
SYSTOLIC BLOOD PRESSURE: 130 MMHG

## 2024-08-06 PROCEDURE — 94621 CARDIOPULM EXERCISE TESTING: CPT | Mod: 26,,, | Performed by: INTERNAL MEDICINE

## 2024-08-06 PROCEDURE — 94621 CARDIOPULM EXERCISE TESTING: CPT

## 2024-08-08 ENCOUNTER — CLINICAL SUPPORT (OUTPATIENT)
Dept: CARDIAC REHAB | Facility: CLINIC | Age: 69
End: 2024-08-08
Payer: MEDICARE

## 2024-08-08 VITALS — HEART RATE: 63 BPM | SYSTOLIC BLOOD PRESSURE: 154 MMHG | OXYGEN SATURATION: 95 % | DIASTOLIC BLOOD PRESSURE: 96 MMHG

## 2024-08-08 DIAGNOSIS — Z95.5 STENTED CORONARY ARTERY: ICD-10-CM

## 2024-08-08 DIAGNOSIS — R07.89 OTHER CHEST PAIN: ICD-10-CM

## 2024-08-08 DIAGNOSIS — I25.10 CORONARY ARTERY DISEASE, UNSPECIFIED VESSEL OR LESION TYPE, UNSPECIFIED WHETHER ANGINA PRESENT, UNSPECIFIED WHETHER NATIVE OR TRANSPLANTED HEART: Primary | ICD-10-CM

## 2024-08-08 PROCEDURE — 93798 PHYS/QHP OP CAR RHAB W/ECG: CPT | Mod: S$GLB,,, | Performed by: INTERNAL MEDICINE

## 2024-08-08 PROCEDURE — 99999 PR PBB SHADOW E&M-EST. PATIENT-LVL III: CPT | Mod: PBBFAC,,,

## 2024-08-12 ENCOUNTER — OFFICE VISIT (OUTPATIENT)
Dept: OTOLARYNGOLOGY | Facility: CLINIC | Age: 69
End: 2024-08-12
Payer: MEDICARE

## 2024-08-12 ENCOUNTER — PATIENT MESSAGE (OUTPATIENT)
Dept: PRIMARY CARE CLINIC | Facility: CLINIC | Age: 69
End: 2024-08-12
Payer: MEDICARE

## 2024-08-12 VITALS
HEIGHT: 73 IN | DIASTOLIC BLOOD PRESSURE: 79 MMHG | HEART RATE: 73 BPM | WEIGHT: 250.25 LBS | BODY MASS INDEX: 33.17 KG/M2 | SYSTOLIC BLOOD PRESSURE: 126 MMHG

## 2024-08-12 DIAGNOSIS — E04.1 THYROID NODULE: Primary | ICD-10-CM

## 2024-08-12 PROCEDURE — 99999 PR PBB SHADOW E&M-EST. PATIENT-LVL IV: CPT | Mod: PBBFAC,,, | Performed by: OTOLARYNGOLOGY

## 2024-08-12 PROCEDURE — 1159F MED LIST DOCD IN RCRD: CPT | Mod: CPTII,S$GLB,, | Performed by: OTOLARYNGOLOGY

## 2024-08-12 PROCEDURE — 3008F BODY MASS INDEX DOCD: CPT | Mod: CPTII,S$GLB,, | Performed by: OTOLARYNGOLOGY

## 2024-08-12 PROCEDURE — 1126F AMNT PAIN NOTED NONE PRSNT: CPT | Mod: CPTII,S$GLB,, | Performed by: OTOLARYNGOLOGY

## 2024-08-12 PROCEDURE — 99214 OFFICE O/P EST MOD 30 MIN: CPT | Mod: S$GLB,,, | Performed by: OTOLARYNGOLOGY

## 2024-08-12 PROCEDURE — 3074F SYST BP LT 130 MM HG: CPT | Mod: CPTII,S$GLB,, | Performed by: OTOLARYNGOLOGY

## 2024-08-12 PROCEDURE — 3044F HG A1C LEVEL LT 7.0%: CPT | Mod: CPTII,S$GLB,, | Performed by: OTOLARYNGOLOGY

## 2024-08-12 PROCEDURE — 3078F DIAST BP <80 MM HG: CPT | Mod: CPTII,S$GLB,, | Performed by: OTOLARYNGOLOGY

## 2024-08-12 PROCEDURE — 1160F RVW MEDS BY RX/DR IN RCRD: CPT | Mod: CPTII,S$GLB,, | Performed by: OTOLARYNGOLOGY

## 2024-08-12 NOTE — ASSESSMENT & PLAN NOTE
High-risk of malignancy on genetic testing.  Case is complicated by stent placement in May of this year requiring 12 months of dual antiplatelet therapy.  Will discuss the safe as possible date that he may temporarily come off of these draw this for surgery with his cardiologist and will plan to coordinate TURP under the same anesthetic feasible.  Repeat ultrasound since it has been 4 months since his last study.

## 2024-08-12 NOTE — PROGRESS NOTES
CC: Thyroid follow up      HPI   68 y.o. male presents for evaluation of multiple thyroid nodules.  He reports a history of a right-sided thyroid nodule dating back to 2017.  He presents an ultrasound from that time.  In comparing the dimensions of the right-sided thyroid nodule, it appears that it is stable over time.  Recent FNA of isthmus nodule and right thyroid nodule revealed FLUS.  Biopsy of the right thyroid nodule in 2017 was benign.  No compressive symptoms.  No family history.  No radiation exposure.    Recent genetic testing revealed high risk of malignancy in both nodules.    Review of Systems   Constitutional: Negative for fatigue and unexpected weight change.   HENT: Per HPI.  Eyes: Negative for visual disturbance.   Respiratory: Negative for shortness of breath, hemoptysis   Cardiovascular: Negative for chest pain and palpitations.   Musculoskeletal: Negative for decreased ROM, back pain.   Skin: Negative for rash, sunburn, itching.   Neurological: Negative for dizziness and seizures.   Hematological: Negative for adenopathy. Does not bruise/bleed easily.   Endocrine: Negative for rapid weight loss/weight gain, heat/cold intolerance.     Past Medical History   Patient Active Problem List   Diagnosis    Obstructive sleep apnea    Insomnia    Hyperlipidemia    Essential hypertension    Benign prostatic hyperplasia    Obesity (BMI 35.0-39.9 without comorbidity)    Type 2 diabetes mellitus with other specified complication, without long-term current use of insulin    Subluxation of tarsal joint of left foot    Morbid (severe) obesity due to excess calories    Stented coronary artery    Thyroid nodule           Past Surgical History   Past Surgical History:   Procedure Laterality Date    ADENOIDECTOMY  2001    UPPP surgery    APPENDECTOMY  2005    BICEPS TENDON REPAIR Left 12/28/2021    CORONARY ANGIOGRAPHY N/A 6/4/2024    Procedure: ANGIOGRAM, CORONARY ARTERY;  Surgeon: Raji Noble MD;  Location:  [facility] CATH LAB/EP;  Service: Cardiology;  Laterality: N/A;    EYE SURGERY  2017    Cararact surgery    FOOT SURGERY Left 05/2017    subtalar implant, 6 weeks later removed due to failure    FOOT SURGERY Left 05/19/2022    HERNIA REPAIR      DOUBLE age 8    IVUS, CORONARY  6/4/2024    Procedure: IVUS, Coronary;  Surgeon: Raji Noble MD;  Location: [facility] CATH LAB/EP;  Service: Cardiology;;    JOINT REPLACEMENT  2009    Bilateral knee replacement    KNEE SURGERY Bilateral 05/2011    TKR, had ligament replacement in right , meniscus repair both knees    LAMINECTOMY  1989    spinal    LEFT HEART CATHETERIZATION Left 6/4/2024    Procedure: Left heart cath;  Surgeon: Raji Noble MD;  Location: [facility] CATH LAB/EP;  Service: Cardiology;  Laterality: Left;    PERCUTANEOUS CORONARY INTERVENTION, ARTERY N/A 6/4/2024    Procedure: Percutaneous coronary intervention;  Surgeon: Raji Noble MD;  Location: [facility] CATH LAB/EP;  Service: Cardiology;  Laterality: N/A;    PTCA, SINGLE VESSEL  6/4/2024    Procedure: PTCA, Single Vessel;  Surgeon: Raji Noble MD;  Location: [facility] CATH LAB/EP;  Service: Cardiology;;    SPINE SURGERY  1991    Triple laminectomy    TONSILLECTOMY  2001    UPPP surgery    UVULOPALATOPHARYNGOPLASTY  2007    VASECTOMY  1985         Family History   Family History   Problem Relation Name Age of Onset    Arthritis Mother Adenike     Asthma Father Pat     Heart disease Father Pat     Cancer Maternal Grandfather Daron     Cancer Maternal Grandmother Erin     Drug abuse Brother Anshul     Drug abuse Brother Thai     Drug abuse Brother Kapil     Drug abuse Brother Mehul            Social History   .  Social History     Socioeconomic History    Marital status:    Tobacco Use    Smoking status: Never    Smokeless tobacco: Never   Substance and Sexual Activity    Alcohol use: Yes     Alcohol/week: 11.0 standard drinks of alcohol     Types: 7 Glasses of wine, 4 Cans of beer per week     Drug use: No    Sexual activity: Yes     Partners: Female     Birth control/protection: Other-see comments     Comment: Vasectomy     Social Determinants of Health     Financial Resource Strain: Low Risk  (6/11/2024)    Overall Financial Resource Strain (CARDIA)     Difficulty of Paying Living Expenses: Not hard at all   Food Insecurity: No Food Insecurity (6/11/2024)    Hunger Vital Sign     Worried About Running Out of Food in the Last Year: Never true     Ran Out of Food in the Last Year: Never true   Transportation Needs: No Transportation Needs (5/9/2024)    PRAPARE - Transportation     Lack of Transportation (Medical): No     Lack of Transportation (Non-Medical): No   Physical Activity: Sufficiently Active (6/11/2024)    Exercise Vital Sign     Days of Exercise per Week: 3 days     Minutes of Exercise per Session: 60 min   Stress: Stress Concern Present (6/11/2024)    [employer] - Occupational Stress Questionnaire     Feeling of Stress : Very much   Housing Stability: Unknown (6/11/2024)    Housing Stability Vital Sign     Unable to Pay for Housing in the Last Year: No         Allergies   Review of patient's allergies indicates:   Allergen Reactions    Hay fever and allergy relief Other (See Comments)           Physical Exam     Vitals:    08/12/24 1350   BP: 126/79   Pulse: 73         Body mass index is 33.01 kg/m².      General: AOx3, NAD   Respiratory:  Symmetric chest rise, normal effort  Neck: No scars.  No cervical lymphadenopathy, thyromegaly or thyroid nodules.  Normal range of motion.    Face: House Brackmann I bilaterally.     US reviewed.    Assessment/Plan  Problem List Items Addressed This Visit          Endocrine    Thyroid nodule - Primary     High-risk of malignancy on genetic testing.  Case is complicated by stent placement in May of this year requiring 12 months of dual antiplatelet therapy.  Will discuss the safe as possible date that he may temporarily come off  of these draw this for surgery with his cardiologist and will plan to coordinate TURP under the same anesthetic feasible.  Repeat ultrasound since it has been 4 months since his last study.         Relevant Orders    US Soft Tissue Head Neck               Answers submitted by the patient for this visit:  Review of Symptoms Questionnaire  (Submitted on 6/17/2024)  Fatigue (Tiredness)?: Yes  hearing loss: Yes  sinus pressure : Yes  Sinus infection(s)?: Yes  None of these : Yes  Sleep Apnea?: Yes  chest pain: Yes  diarrhea: Yes  Difficulty urinating?: Yes  Urinating too frequently?: Yes  back pain: Yes  neck pain: Yes  None of these : Yes  Seasonal Allergies?: Yes  None of these : Yes  None of these: Yes  None of these: Yes  sleep disturbance: Yes  iewed.

## 2024-08-12 NOTE — Clinical Note
Dr. Crawleysef:  I see Mr. Jerome for thyroid nodules. He has 2 nodules worrisome for malignancy on genetic testing.  When is the earliest that he could safely come off ASA and Plavix for thyroidectomy?  He could resume 2 days post op.  Thanks, Ze Avelar

## 2024-08-13 NOTE — TELEPHONE ENCOUNTER
Care Due:                  Date            Visit Type   Department     Provider  --------------------------------------------------------------------------------                                EP -                              PRIMARY      [facility] PRIMARY  Last Visit: 04-      CARE ([facility])   ROBERT Jerome                              EP -                              PRIMARY      [facility] PRIMARY  Next Visit: 10-      CARE ([facility])   ROBERT Jerome                                                            Last  Test          Frequency    Reason                     Performed    Due Date  --------------------------------------------------------------------------------    CMP.........  12 months..  rosuvastatin.............  09- 09-    [facility] Embedded Care Due Messages. Reference number: [id number].   8/13/2024 6:31:49 PM CDT

## 2024-08-14 ENCOUNTER — CLINICAL SUPPORT (OUTPATIENT)
Dept: CARDIAC REHAB | Facility: CLINIC | Age: 69
End: 2024-08-14
Payer: MEDICARE

## 2024-08-14 DIAGNOSIS — I25.10 CORONARY ATHEROSCLEROSIS OF NATIVE CORONARY ARTERY: ICD-10-CM

## 2024-08-14 DIAGNOSIS — Z95.5 STENTED CORONARY ARTERY: Primary | ICD-10-CM

## 2024-08-14 PROCEDURE — 93798 PHYS/QHP OP CAR RHAB W/ECG: CPT | Mod: S$GLB,,, | Performed by: INTERNAL MEDICINE

## 2024-08-15 ENCOUNTER — HOSPITAL ENCOUNTER (OUTPATIENT)
Dept: RADIOLOGY | Facility: HOSPITAL | Age: 69
Discharge: HOME | End: 2024-08-15
Attending: OTOLARYNGOLOGY
Payer: MEDICARE

## 2024-08-15 DIAGNOSIS — E04.1 THYROID NODULE: ICD-10-CM

## 2024-08-15 PROCEDURE — 76536 US EXAM OF HEAD AND NECK: CPT | Mod: 26,,, | Performed by: RADIOLOGY

## 2024-08-15 PROCEDURE — 76536 US EXAM OF HEAD AND NECK: CPT | Mod: TC

## 2024-08-15 RX ORDER — INSULIN PUMP SYRINGE, 3 ML
EACH MISCELLANEOUS
Qty: 1 EACH | Refills: 0 | Status: SHIPPED | OUTPATIENT
Start: 2024-08-15 | End: 2025-08-13

## 2024-08-15 RX ORDER — BLOOD-GLUCOSE CONTROL, NORMAL
1 EACH MISCELLANEOUS 2 TIMES DAILY
Qty: 100 EACH | Refills: 3 | Status: SHIPPED | OUTPATIENT
Start: 2024-08-15 | End: 2025-08-15

## 2024-08-16 ENCOUNTER — CLINICAL SUPPORT (OUTPATIENT)
Dept: CARDIAC REHAB | Facility: CLINIC | Age: 69
End: 2024-08-16
Payer: MEDICARE

## 2024-08-16 DIAGNOSIS — I25.10 CORONARY ATHEROSCLEROSIS OF NATIVE CORONARY ARTERY: ICD-10-CM

## 2024-08-16 DIAGNOSIS — Z95.5 STENTED CORONARY ARTERY: Primary | ICD-10-CM

## 2024-08-18 ENCOUNTER — PATIENT MESSAGE (OUTPATIENT)
Dept: OTOLARYNGOLOGY | Facility: CLINIC | Age: 69
End: 2024-08-18
Payer: MEDICARE

## 2024-08-19 ENCOUNTER — CLINICAL SUPPORT (OUTPATIENT)
Dept: CARDIAC REHAB | Facility: CLINIC | Age: 69
End: 2024-08-19
Payer: MEDICARE

## 2024-08-19 DIAGNOSIS — Z95.5 STENTED CORONARY ARTERY: Primary | ICD-10-CM

## 2024-08-19 DIAGNOSIS — I25.10 CORONARY ARTERY DISEASE, UNSPECIFIED VESSEL OR LESION TYPE, UNSPECIFIED WHETHER ANGINA PRESENT, UNSPECIFIED WHETHER NATIVE OR TRANSPLANTED HEART: ICD-10-CM

## 2024-08-19 PROCEDURE — 93798 PHYS/QHP OP CAR RHAB W/ECG: CPT | Mod: S$GLB,,, | Performed by: INTERNAL MEDICINE

## 2024-08-20 ENCOUNTER — PATIENT MESSAGE (OUTPATIENT)
Dept: UROLOGY | Facility: CLINIC | Age: 69
End: 2024-08-20
Payer: MEDICARE

## 2024-08-20 ENCOUNTER — PATIENT MESSAGE (OUTPATIENT)
Dept: PRIMARY CARE CLINIC | Facility: CLINIC | Age: 69
End: 2024-08-20
Payer: MEDICARE

## 2024-08-21 ENCOUNTER — CLINICAL SUPPORT (OUTPATIENT)
Dept: CARDIAC REHAB | Facility: CLINIC | Age: 69
End: 2024-08-21
Payer: MEDICARE

## 2024-08-21 DIAGNOSIS — Z95.5 STENTED CORONARY ARTERY: Primary | ICD-10-CM

## 2024-08-21 DIAGNOSIS — I25.10 CORONARY ATHEROSCLEROSIS OF NATIVE CORONARY ARTERY: ICD-10-CM

## 2024-08-21 PROCEDURE — 93798 PHYS/QHP OP CAR RHAB W/ECG: CPT | Mod: S$GLB,,, | Performed by: INTERNAL MEDICINE

## 2024-08-22 ENCOUNTER — TELEPHONE (OUTPATIENT)
Dept: UROLOGY | Facility: CLINIC | Age: 69
End: 2024-08-22
Payer: MEDICARE

## 2024-08-23 ENCOUNTER — CLINICAL SUPPORT (OUTPATIENT)
Dept: CARDIAC REHAB | Facility: CLINIC | Age: 69
End: 2024-08-23
Payer: MEDICARE

## 2024-08-23 DIAGNOSIS — I25.10 ATHEROSCLEROSIS OF NATIVE CORONARY ARTERY OF NATIVE HEART, UNSPECIFIED WHETHER ANGINA PRESENT: ICD-10-CM

## 2024-08-23 DIAGNOSIS — Z95.5 STENTED CORONARY ARTERY: Primary | ICD-10-CM

## 2024-08-23 NOTE — PROGRESS NOTES
Dr. Noble,  Mr. Jerome has been attending Phase II cardiac rehab.  During cool down stretches, noted a 10-12 second run of SVT on monitor.  Stretches stopped & sat patient down.  Had patient cough & converted back to sinus tachycardia.  Patient was asymptomatic.  Highest heart rate noted was 169 BPM.  Dr. Dan (cardiac rehab DrHemal) was notified & reviewed EKG.  EKG can be reviewed in Epic, card procedure, cardiac monitoring for today.  Patient was instructed to seek attention via ER/911 with any problems.  Patient is not on a Beta Blocker.  I can be contacted at [phone] with any questions.    Thanks,  Carissa Villalpando RN  Cardiac Rehab Nurse

## 2024-08-26 ENCOUNTER — CLINICAL SUPPORT (OUTPATIENT)
Dept: CARDIAC REHAB | Facility: CLINIC | Age: 69
End: 2024-08-26
Payer: MEDICARE

## 2024-08-26 DIAGNOSIS — I25.10 ATHEROSCLEROSIS OF NATIVE CORONARY ARTERY OF NATIVE HEART WITHOUT ANGINA PECTORIS: ICD-10-CM

## 2024-08-26 DIAGNOSIS — Z95.5 STENTED CORONARY ARTERY: Primary | ICD-10-CM

## 2024-08-26 PROCEDURE — 93798 PHYS/QHP OP CAR RHAB W/ECG: CPT | Mod: S$GLB,,, | Performed by: INTERNAL MEDICINE

## 2024-08-27 ENCOUNTER — TELEPHONE (OUTPATIENT)
Dept: OTOLARYNGOLOGY | Facility: CLINIC | Age: 69
End: 2024-08-27
Payer: MEDICARE

## 2024-08-27 DIAGNOSIS — E04.1 THYROID NODULE: Primary | ICD-10-CM

## 2024-08-28 ENCOUNTER — CLINICAL SUPPORT (OUTPATIENT)
Dept: CARDIAC REHAB | Facility: CLINIC | Age: 69
End: 2024-08-28
Payer: MEDICARE

## 2024-08-28 DIAGNOSIS — I25.10 CORONARY ATHEROSCLEROSIS OF NATIVE CORONARY ARTERY: ICD-10-CM

## 2024-08-28 DIAGNOSIS — Z95.5 STENTED CORONARY ARTERY: Primary | ICD-10-CM

## 2024-08-28 PROCEDURE — 93798 PHYS/QHP OP CAR RHAB W/ECG: CPT | Mod: S$GLB,,, | Performed by: INTERNAL MEDICINE

## 2024-08-28 RX ORDER — LOSARTAN POTASSIUM AND HYDROCHLOROTHIAZIDE 12.5; 1 MG/1; MG/1
1 TABLET ORAL
Qty: 90 TABLET | Refills: 2 | Status: SHIPPED | OUTPATIENT
Start: 2024-08-28

## 2024-08-28 NOTE — TELEPHONE ENCOUNTER
No care due was identified.  [facility] Embedded Care Due Messages. Reference number: [id number].   8/28/2024 5:44:24 AM CDT

## 2024-08-28 NOTE — TELEPHONE ENCOUNTER
Refill Decision Note   Yuriy Jerome  is requesting a refill authorization.  Brief Assessment and Rationale for Refill:  Approve     Medication Therapy Plan:         Comments:     Note composed:12:19 PM 08/28/2024

## 2024-08-29 ENCOUNTER — DOCUMENTATION ONLY (OUTPATIENT)
Dept: CARDIAC REHAB | Facility: CLINIC | Age: 69
End: 2024-08-29
Payer: MEDICARE

## 2024-08-29 NOTE — PROGRESS NOTES
Mr. Yan has completed 8 out of 36 exercise session of Phase II cardiac rehab.  A follow up reassessment will be completed at 12 sessions.    Session: Orientation     Cardiac Rehab Individual Treatment Plan - Initial Assessment      Patient Name: Yuriy Jerome MRN: [id number]   : 1955   Age: 68 y.o.   Primary Diagnosis: PTCA/STENT  Date of Event: 24  EF: 70%  Risk Stratification: moderate  Referring Physician: TOSHIA   Exercise Assessment:     CPX/TM Date: 24 Results   RHR 64   Max    Peak VO2 (CPX only) 23.1   Actual METS (CPX only) 6.60   Estimated METS 12.0     Anthropometrics    Height 73 inches   Weight 249 lbs   BMI 33.0   Abdominal Girth 48.5   Body Composition 24.3%     ST Depression noted on Stress Test?:No  Angina with exercise?: No   Fall Risk: Yes   Assistive Devices:  independent   Currently exercising? No was going to a gym but hasn't been there for at least 2 weeks.  Mr. Yan stated there were some limitations to exercise due to lower back pain with certain walking speeds and elevations.  He also has bilateral flat feet which is also painful with excessive walking.  Exercise modalities will be modified to meet the patient's needs and capabilities.     Exercise Plan:   Goals:  CR Exercise Goals: Attend Cardiac Rehab 3 times/week: In Progress  Home Aerobic Exercise: 2 additional days/week for 30-60 minutes: In Progress  Intensity of 12-15 on the Rate of Perceived Exertion (RPE) scale: In Progress  30% increase in entry estimated METS: 15.6 : In Progress  5 days/week for 30-60 minutes: In Progress  Demonstrate proper pulse taking technique: In Progress    Intervention:   Discussed importance of regular attendance to cardiac rehab class    Exercise Prescription:  THR Range    Mode: Treadmill  Recumbent Bike  Upright Bike  Nustep  Elliptical   Frequency:  3 days/week   Duration:  30 - 60 minutes   Intensity:  12 - 15 RPE   Resistance Training:  Yes: 5 to 8 lb weights with 10-15  reps based on strength and range of motion assessment     Home Prescription:  Mode Aerobic   Frequency: 2- 3 days/week   Duration: 30-60 minutes   Resistance Training: None        Education:  Orientation to Equipment; verbalizes understanding; Date: 24  Exercise Recommendations; verbalizes understanding; Date: 24  Exercise Safety; verbalizes understanding; Date: 24  Class Preparation: verbalizes understanding; Date: 24  Signs and symptoms to report: verbalizes understanding; Date: 24  Caffeine/Hydration: verbalizes understanding; Date: 24  Exercise Terminology: verbalizes understanding; Date: 24  Resistance Training: verbalizes understanding; Date: 24    Comments:  I encouraged Mr. Yan to begin thinking about some type of aerobic exercise he can participate in at least 2 non-rehab days per week for at least 30 minutes in addition to attending Phase II cardiac rehab classes 3 days per week.  I suggested he try walking until he is in rehab and has a solid plan for exercising in a gym.  He stated understanding.    All consent forms were signed, proper attire and shoes were discussed.       Mr. Yan will begin Cardiac Rehab on  at 2:15 pm if he has his glucometer before then.    The exercise prescription will be adjusted based on tolerance of exercise intensity by patient.    Stanislav Howard, CEP    Orientation   Cardiac Rehab Individual Treatment Plan - Initial Assessment      Patient Name: Yuriy Jerome MRN: [id number]   : 1955   Age: 68 y.o.   Primary Diagnosis: PTCA/stent. CAD, HTN, HLD, DM    Nutrition Assessment:     Anthropometrics    Height 73 inches   Weight 249 lbs   BMI 33   Abdominal Girth 48.5   Body Composition 24.3       Drug Allergies and Intolerances:  Review of patient's allergies indicates:   Allergen Reactions    Hay fever and allergy relief Other (See Comments)       Food Allergies and Intolerances:  NA    Past Medical History:  Past  Medical History:   Diagnosis Date    Allergy     Diabetes mellitus     prediabetic    Sleep apnea     Staphylococcal infection     left ankle, 2017       Past Surgical History:  Past Surgical History:   Procedure Laterality Date    ADENOIDECTOMY  2001    UPPP surgery    APPENDECTOMY  2005    BICEPS TENDON REPAIR Left 12/28/2021    CORONARY ANGIOGRAPHY N/A 6/4/2024    Procedure: ANGIOGRAM, CORONARY ARTERY;  Surgeon: Raji Noble MD;  Location: [facility] CATH LAB/EP;  Service: Cardiology;  Laterality: N/A;    EYE SURGERY  2017    Cararact surgery    FOOT SURGERY Left 05/2017    subtalar implant, 6 weeks later removed due to failure    FOOT SURGERY Left 05/19/2022    HERNIA REPAIR      DOUBLE age 8    IVUS, CORONARY  6/4/2024    Procedure: IVUS, Coronary;  Surgeon: Raji Noble MD;  Location: [facility] CATH LAB/EP;  Service: Cardiology;;    JOINT REPLACEMENT  2009    Bilateral knee replacement    KNEE SURGERY Bilateral 05/2011    TKR, had ligament replacement in right , meniscus repair both knees    LAMINECTOMY  1989    spinal    LEFT HEART CATHETERIZATION Left 6/4/2024    Procedure: Left heart cath;  Surgeon: Raji Noble MD;  Location: [facility] CATH LAB/EP;  Service: Cardiology;  Laterality: Left;    PERCUTANEOUS CORONARY INTERVENTION, ARTERY N/A 6/4/2024    Procedure: Percutaneous coronary intervention;  Surgeon: Raji Noble MD;  Location: [facility] CATH LAB/EP;  Service: Cardiology;  Laterality: N/A;    PTCA, SINGLE VESSEL  6/4/2024    Procedure: PTCA, Single Vessel;  Surgeon: Raji Noble MD;  Location: [facility] CATH LAB/EP;  Service: Cardiology;;    SPINE SURGERY  1991    Triple laminectomy    TONSILLECTOMY  2001    UP surgery    UVULOPALATOPHARYNGOPLASTY  2007    VASECTOMY  1985       Medications:  Current Outpatient Medications   Medication Sig    alpha lipoic acid 600 mg Cap Take by mouth.    amLODIPine (NORVASC) 5 MG tablet TAKE 1 TABLET(5 MG) BY MOUTH EVERY DAY    aspirin (ECOTRIN) 81 MG EC tablet  Take 81 mg by mouth once daily.    blood sugar diagnostic (ONETOUCH VERIO TEST STRIPS) Strp 1 strip by Misc.(Non-Drug; Combo Route) route 2 (two) times a day.    blood-glucose meter (ONETOUCH VERIO REFLECT START) kit Use as instructed    clopidogreL (PLAVIX) 75 mg tablet Take 1 tablet (75 mg total) by mouth once daily.    coQ10, ubiquinol, 200 mg Cap     finasteride (PROSCAR) 5 mg tablet Take 1 tablet (5 mg total) by mouth once daily.    fluticasone propionate (FLONASE) 50 mcg/actuation nasal spray fluticasone propionate 50 mcg/actuation nasal spray,suspension    lancets (ONETOUCH DELICA PLUS LANCET) 30 gauge Misc 1 lancet  by Misc.(Non-Drug; Combo Route) route 2 (two) times a day.    losartan-hydrochlorothiazide 100-12.5 mg (HYZAAR) 100-12.5 mg Tab TAKE 1 TABLET BY MOUTH EVERY DAY    metFORMIN (GLUCOPHAGE) 1000 MG tablet TAKE 1 TABLET(1000 MG) BY MOUTH TWICE DAILY WITH MEALS    mirabegron (MYRBETRIQ) 50 mg Tb24 Take 1 tablet (50 mg total) by mouth once daily.    multivit with minerals/lutein (MULTIVITAMIN 50 PLUS ORAL) Take by mouth.    nitroGLYCERIN (NITROSTAT) 0.4 MG SL tablet Place 1 tablet (0.4 mg total) under the tongue every 5 (five) minutes as needed for Chest pain.    omega-3 fatty acids/fish oil (FISH OIL-OMEGA-3 FATTY ACIDS) 300-1,000 mg capsule Take by mouth once daily.    rosuvastatin (CRESTOR) 20 MG tablet TAKE 1 TABLET(20 MG) BY MOUTH EVERY DAY    UNABLE TO FIND osteobiflex     Current Facility-Administered Medications   Medication    sodium chloride 0.9% flush 10 mL       Vitamins and Supplements:  MVI, ALA, Fish Oil, CoQ10, Osteo Biflex     Labs:  Patient confirms he is taking Crestor 20mg for cholesterol control.    Lab Results   Component Value Date    CHOL 140 08/06/2024     Lab Results   Component Value Date    HDL 38 (L) 08/06/2024     Lab Results   Component Value Date    LDLCALC 59.2 (L) 08/06/2024     Lab Results   Component Value Date    TRIG 214 (H) 08/06/2024     Lab Results   Component  Value Date    CHOLHDL 27.1 08/06/2024         Lab Results   Component Value Date    GLUF 140 (H) 08/06/2024     Lab Results   Component Value Date    HGBA1C 6.4 (H) 08/06/2024       Nutrition/Diet History:  Patient eats 2 meals daily.    Seasons food with varies.  Patient denies use of a salt shaker at the table on prepared foods.   Dines out 1 per week at restaurants.    Chooses fried foods rarely   Chooses fish 3-4 time(s) per week.   Beverages:  water, coffee without sugar, and unsweet tea  Alcohol: glass of wine with dinner    24 Hour Recall:  Breakfast: Granola/apple/nuts/craisins/prunes mixed with greek yogurt topped with plain cheerios and almond milk  Lunch:NA  Dinner:salmon, mixed vegetables, side salad   Other: cheez-its    Difficulty Chewing or Swallowing: No  Current Exercise: See Exercise Physiologist Note  Food Safety/Food Preparation: spouse  Living Arrangements/Family Support: Lives with spouse  Cultural/Spiritual/Personal Preferences: not applicable   Barriers to Education: none identified  Stage of Change Related to Diet Habits: Action    Nutrition Diagnosis:  Food and nutrition related knowledge deficit related to the lack of prior nutrition education as evidenced by diet history and 24 hour recall    Nutrition Plan:   Goals:  LDL-C < 70 (for high risk patients)  Hgb A1c < 7%  BMI < 25 and abdominal girth < 40M/<35 F  2 gram sodium, Mediterranean diet  Rehab weight goal: -10  Fish intake (non-fried varieties) to a goal of 2-3 servings per week.   Increase fruit and vegetable intake    Interventions/Recommendations:  Lab results reviewed and discussed  Nutrition Prescription:  Total Energy Estimated Needs: 7919-0594 Kcal/d for weight loss  Method for Estimating Needs: 20-23kcal/kg ABW  Total Protein Estimated Needs:  g/d  Method for Estimating Needs: 1-1.3g/Kg ABW  Total Fluid Estimated Needs: 1 mL/Kcal  Dietitian Consult: No  Patient to participate in Cardiac Rehab sessions three times a  week  Weekly Dietitian Weight Check  Encouraged patient to complete 3 day food diary  Follow Up Plan for Ongoing Self-Management Support    Education:  Mediterranean Diet; verbalizes understanding; Date: 8/8/24  Person taught: patient  Preferred Learning Method: Verbal, Written  Education Needed/Provided: Nutrition counseling and education related to cardiac rehabilitation  Education Method: Weekly nutrition lectures on the Mediterranean diet, cooking, shopping, and dining out  Written Materials Provided: 3 Day Food Record, Introduction to Mediterranean Diet  Strategies Implemented: Motivational interviewing, Goal setting, Self-Monitoring, and Problem Solving    Comments:   Discussed ways to incorporate healthy snacks, eating on a schedule, and monitoring sodium intake for heart health.  Pt and wife have already made significant diet changes prior to rehab focusing on more fish and produce and limiting processed foods.     Diabetes  Is the patient diabetic? Yes   Other Core Components/Diabetes Assessment:   Labs:  Lab Results   Component Value Date    GLUF 140 (H) 08/06/2024     Lab Results   Component Value Date    HGBA1C 6.4 (H) 08/06/2024    HGBA1C 6.8 (H) 04/01/2024    HGBA1C 7.1 (H) 09/08/2023      Lab Results   Component Value Date    ESTIMATEDAVG 137 (H) 08/06/2024    ESTIMATEDAVG 148 (H) 04/01/2024    ESTIMATEDAVG 157 (H) 09/08/2023       History of diabetes since 2023  Diabetes medications: metformin 1000mg BID  Blood Glucose Checks at Home: No  Endocrinologist or PCP following DM: PCP Dr. Jerome    Other Core Components/Diabetes Plan:   Goals:  Hgb A1c < 7%  Exercise Blood Glucose: 100-300 mg/dl    Interventions:  Reviewed and Discussed Labs  Medication Compliance  Med Card Reconciled  Low Sodium, Mediterranean, ADA Diet  Weight Management  Physical Activity  Increase Knowledge of Contributory Factors  Home Monitoring    Education:  Mediterranean Diet; verbalizes understanding; Date: 8/8/24    Comments:    Patient verbalizes understanding to bring home glucometer and check glucose pre and post each exercise session.  Per cardiac rehab protocols, patient's glucose must be between 90 and 270 mg/dL to exercise.  Patient denies any recent glucose levels less than 60 mg/dL or greater than 300 mg/dL. Patient verbalizes importance of notifying rehab staff if symptoms of hypoglycemia occur while at cardiac rehab.  Abnormal labs will be reported to patient's PCP/Endocrinologist by rehab staff.    Noted updated parameters of 100-300  Pt does not have glucometer-message sent to PCP for glucometer and strips. Pt instructed to check once at home different times of the day to monitor trends.     RD contact information provided.      Em Herman MS, RDN/LDN    Session: Orientation   Cardiac Rehab Individual Treatment Plan - Initial Assessment      Patient Name: Yuriy Jerome MRN: [id number]   : 1955   Age: 68 y.o.   Date of Event: 2024   Primary Diagnosis: PTCA    EF: 70%    Physical Assessment:   There were no vitals taken for this visit.    ASSESSMENT:  Heart Sounds: regular rate and rhythm  Prosthetic Valve: No  Lung Sounds: normal air entry, lungs clear to auscultation  Capillary Refill: normal  Left Radial Pulse: Normal (+2)  Right Radial Pulse: Normal (+2)  Left Pedal Pulse: Normal (+2)  Right Pedal Pulse: Normal (+2)  Right Edema: none  Left Edema none  Strength: normal  Range of Motion: full range of motion  Existing Limitations:      Site   [] Arthritis, bursitis    [] Amputation, atrophy    [x] Other: Right sciatic nerve pain   []       Diabetic patient's foot examination comments: Normal -  Bilateral  Incisional site: N/A  Special needs: sleep disturbance    Psychosocial Assessment:   Outcome Survey Tools:    CHON SCORES:               SF-36             PHQ-9:      2024     8:14 AM   PHQ-9 Depression Patient Health Questionnaire   Over the last two weeks how often have you been bothered by little  interest or pleasure in doing things 0   Over the last two weeks how often have you been bothered by feeling down, depressed or hopeless 0   Over the last two weeks how often have you been bothered by trouble falling or staying asleep, or sleeping too much 1   Over the last two weeks how often have you been bothered by feeling tired or having little energy 1   Over the last two weeks how often have you been bothered by a poor appetite or overeating 1   Over the last two weeks how often have you been bothered by feeling bad about yourself - or that you are a failure or have let yourself or your family down 0   Over the last two weeks how often have you been bothered by trouble concentrating on things, such as reading the newspaper or watching television 1   Over the last two weeks how often have you been bothered by moving or speaking so slowly that other people could have noticed. 0   Over the last two weeks how often have you been bothered by thoughts that you would be better off dead, or of hurting yourself 0   If you checked off any problems, how difficult have these problems made it for you to do your work, take care of things at home or get along with other people? Somewhat difficult   PHQ-9 Score 4              Living Arrangements: Lives with spouse  Family Support: children and spouse  Self Reported: Effective Coping Skills  Displays: happiness and calmness  Medication: not applicable    Psychosocial Plan:   Goals:  Verbalizes coping mechanisms  Maintain positive support system  Maintain positive outlook  Improve overall quality of life    Interventions/Recommendations:  Discussed Results of Surveys  Patient to Self Report Emotional Changes at Session Check In  Recommend Physical Activity  Recommend Attending Education Lectures  Notify MD: No  Program Referral: No  Pharmaceutical Intervention/Therapy: No  Other Needs: not applicable  Stage of Readiness to Change: Preparation    Education:  Stress Management;  verbalizes understanding; Date: 8/8/2024  Stress; verbalizes understanding; Date: 8/8/2024  Insomnia; verbalizes understanding; Date: 8/8/2024    Comments:  Patient will be working on achieving goals that were set.  He reports to have good support from his wife & children.  He also has 3 grandchildren.  He is a retired  & states that since he retired, his stress level has decrease dramatically.  He plans to attend lectures & exercise sessions on a consistent basis & incorporating 2 additional days of exercise outside of cardiac rehab. He has been instructed to notify staff in the event that circumstances change.  Patient verbalizes understanding.    Other Core Components/Risk Factors Assessment:   RISK FACTORS:  diabetes, hyperlipidemia, hypertension, obesity, positive family history, sedentary lifestyle    Learning Barriers: None    Education Level:  Post-College Graduate Degree    Pre-test Score: 90%    Medication Compliance: has been compliant with taking medications    Other Core Components/Risk Factors Plan:   Goals:  Decrease cholesterol level: In Progress  Increase exercise tolerance: In Progress  Increase knowledge of CAD: In Progress  Decrease blood pressure: In Progress  Weight loss: In Progress  Control diabetes by adjusting diet and exercise: In Progress    Interventions/Recommendations:  Recommend regular attendance for Cardiac Rehab: Exercise and Education Lectures  Encourage medication compliance  Individual Education/ Counseling: Yes  Physician Referral: No    Education:    blood pressure meds, verbalizes understanding; Date: 8/8/2024  cholesterol, verbalizes understanding; Date: 8/8/2024  diabetes, verbalizes understanding; Date: 8/8/2024  fluid overload/CHF, verbalizes understanding; Date: 8/8/2024  hypertension, verbalizes understanding; Date: 8/8/2024  risk factors, verbalizes understanding; Date: 8/8/2024  sodium, verbalizes understanding; Date: 8/8/2024  stress, verbalizes  understanding; Date: 8/8/2024         Education method adapted to patients education level and preferred method of learning.  Method: explanation    Comments:  Patient plans to work on achieving goals that were set to decrease risk factors for CAD.  He plans to attend lectures & exercise sessions on a consistent basis.  He was encouraged to exercise 2 additional days outside of cardiac rehab.  Patient will be monitoring pre/post blood glucose while in cardiac rehab & encouraged for him to monitor outside of rehab.      Other Core Components/Hypertension Assessment:   Resting BP: 154/96  BP Readings from Last 1 Encounters:   08/12/24 126/79     BP Diagnosis: Hypertensive  Patient reported symptoms: none    Medications:  Medication Prescribed? Adherent? Exception   Beta-blocker [x]Yes  []No  []Unknown [x]Yes  []No  []Unknown    ACEI/ARB [x]Yes  []No  []Unknown [x]Yes  []No  []Unknown    Calcium Channel Blocker [x]Yes  []No  []Unknown [x]Yes  []No  []Unknown    Diuretic [x]Yes  []No  []Unknown [x]Yes  []No  []Unknown        Other Core Components/Hypertension Plan:   Goals:  Blood Pressure <130/80    Interventions/Recommendations:  Med Card Reconciled: Yes  Encourage medication compliance  Encourage sodium reduction  Reduce alcohol consumption  Encourage weight loss  Recommend physical activity  Educate on contributory factors  Reduce stress, anxiety, anger, depression, and/or chronic pain  Encourage home blood pressure monitoring  Recommend daily weights    Education:    Hypertension; verbalizes understanding; Date: 8/8/2024  Coronary Artery Disease; verbalizes understanding; Date: 8/8/2024  Risk Factors; verbalizes understanding; Date: 8/8/2024  Stress; verbalizes understanding; Date: 8/8/2024         Comments:  Patient will be following recommendations to keep BP under good control.  He states that his wife does most of the cooking & does not cook with salt, nor does he add salt to food.  Reports to usually drink a  glass of wine with dinner.  He does monitor his BP & weight a few days per week.  Encouraged to monitor daily.      Does the patient have Heart Failure? No    Other Core Components/Tobacco Cessation Assessment:   Smoking Status: Lifetime Non-smoker  Primary Tobacco Type: Cigar  Tobacco Usage: no  Smoking Cessation Barriers:  N/A  Stage of Readiness to Change: Maintenance    Other Core Components/Tobacco Cessation Plan:   Goals:  Maintain non-smoking status    Interventions:  Maintains non-smoking status    Education:    Risk Factors; verbalizes understanding; Date: 8/8/2024         Comments:  Non smoker.    Discussed Cardiac Rehab program in depth with patient.  Medication list updated per patient & marked as reviewed.  Patient has been instructed to notify staff of any problems while attending rehab (ie: chest pain, shortness of breath, lightheadedness, dizziness).  Patient has been instructed to monitor blood pressure readings outside of rehab & to keep a daily log of the readings.  Patient verbalizes understanding.    Carissa Villalpando RN  Cardiac Rehab Nurse

## 2024-08-30 ENCOUNTER — CLINICAL SUPPORT (OUTPATIENT)
Dept: CARDIAC REHAB | Facility: CLINIC | Age: 69
End: 2024-08-30
Payer: MEDICARE

## 2024-08-30 DIAGNOSIS — I25.10 CORONARY ATHEROSCLEROSIS OF NATIVE CORONARY ARTERY: ICD-10-CM

## 2024-08-30 DIAGNOSIS — R00.2 PALPITATIONS: Primary | ICD-10-CM

## 2024-08-30 DIAGNOSIS — Z95.5 STENTED CORONARY ARTERY: Primary | ICD-10-CM

## 2024-08-30 NOTE — PROGRESS NOTES
Subjective:      Yuriy Jerome is a 68 y.o. male who returns today regarding his     Robotic simple prostatectomy was scheduled in May 2024    Preop stress test was +  Had PTCA the same day as the stress test    Now on plavix for 1 years.    The following portions of the patient's history were reviewed and updated as appropriate: allergies, current medications, past family history, past medical history, past social history, past surgical history and problem list.    Review of Systems  Pertinent items are noted in HPI.  A comprehensive multipoint review of systems was negative except as otherwise stated in the HPI.    Past Medical History:   Diagnosis Date    Allergy     Diabetes mellitus     prediabetic    Sleep apnea     Staphylococcal infection     left ankle, 2017     Past Surgical History:   Procedure Laterality Date    ADENOIDECTOMY  2001    UPPP surgery    APPENDECTOMY  2005    BICEPS TENDON REPAIR Left 12/28/2021    CORONARY ANGIOGRAPHY N/A 6/4/2024    Procedure: ANGIOGRAM, CORONARY ARTERY;  Surgeon: Raji Noble MD;  Location: [facility] CATH LAB/EP;  Service: Cardiology;  Laterality: N/A;    EYE SURGERY  2017    Cararact surgery    FOOT SURGERY Left 05/2017    subtalar implant, 6 weeks later removed due to failure    FOOT SURGERY Left 05/19/2022    HERNIA REPAIR      DOUBLE age 8    IVUS, CORONARY  6/4/2024    Procedure: IVUS, Coronary;  Surgeon: Raji Noble MD;  Location: [facility] CATH LAB/EP;  Service: Cardiology;;    JOINT REPLACEMENT  2009    Bilateral knee replacement    KNEE SURGERY Bilateral 05/2011    TKR, had ligament replacement in right , meniscus repair both knees    LAMINECTOMY  1989    spinal    LEFT HEART CATHETERIZATION Left 6/4/2024    Procedure: Left heart cath;  Surgeon: Raji Noble MD;  Location: [facility] CATH LAB/EP;  Service: Cardiology;  Laterality: Left;    PERCUTANEOUS CORONARY INTERVENTION, ARTERY N/A 6/4/2024    Procedure: Percutaneous coronary intervention;  Surgeon:  Raji Noble MD;  Location: [facility] CATH LAB/EP;  Service: Cardiology;  Laterality: N/A;    PTCA, SINGLE VESSEL  6/4/2024    Procedure: PTCA, Single Vessel;  Surgeon: Raji Noble MD;  Location: [facility] CATH LAB/EP;  Service: Cardiology;;    SPINE SURGERY  1991    Triple laminectomy    TONSILLECTOMY  2001    UPPP surgery    UVULOPALATOPHARYNGOPLASTY  2007    VASECTOMY  1985     Diabetes Medications               metFORMIN (GLUCOPHAGE) 1000 MG tablet TAKE 1 TABLET(1000 MG) BY MOUTH TWICE DAILY WITH MEALS          Review of patient's allergies indicates:   Allergen Reactions    Hay fever and allergy relief Other (See Comments)          Objective:   Vitals: There were no vitals taken for this visit.    Physical Exam   General: alert and oriented, no acute distress  Respiratory: Symmetric expansion, non-labored breathing  Cardiovascular: no peripheral edema  Abdomen: soft, non distended  Skin: normal coloration and turgor, no rashes, no suspicious skin lesions noted  Neuro: no gross deficits  Psych: normal judgment and insight, normal mood/affect, and non-anxious    Physical Exam    Lab Review   Urinalysis demonstrates no specimen    Lab Results   Component Value Date    WBC 6.54 08/06/2024    HGB 14.9 08/06/2024    HCT 45.2 08/06/2024    MCV 94 08/06/2024     08/06/2024     Lab Results   Component Value Date    CREATININE 1.0 06/03/2024    BUN 16 06/03/2024         Imaging  Urodynamics Dr Newell:    Summary:  This study was performed in accordance with the current AUA/SUFU Guideline on Adult Urodynamics. On filling phase he demonstrates early first and strong desire with a small bladder capacity. There is no detrusor overactivity (DO) demonstrated on this exam (though absence of DO on urodynamic evaluation does not exclude it as causative agent of urgency symptoms). Bladder compliance at capacity is normal.      On stress testing, with adequate cough and valsalva effort, there is no PRECIOUS demonstrated  and patient reports no clinical history of PRECIOUS.     On voiding phase,  voiding pressures are elevated, particularly with respect to the attenuated flow. His bladder outlet obstruction index (BOOI=78) is clearly indicative of obstruction with adequate detrusor contractility (BCI = 113 . He empties incompletely. The voiding study was performed twice with very similar findings on both studies.          Electronically signed by Tono Newell MD at 4/2/2024 12:52 PM  Assessment and Plan:   BPH with urinary obstruction  109g   cont proscar    Urgency incontinence   myrbetriq    OAB (overactive bladder)  myrbetriq    Discussed aquablation and robotic simple prostatectomy and HOLEP  Will delay any surgery until after he completes plavix in 5/2025    RTC 5/2025 with UA, PVR, AUASS  Will likely reschedule robotic simple prostatectomy if he can stop plavix  He may remain on ASA for surgery      Addendum in-basket message 9/4/2024  Raji Noble MD Collins, Sean M., MD Dr. Collins,  Yes sure he can hold Plavix in May 2025 for prostatectomy.  Also if it needs to be done sooner he can hold it safely around December 2024 and stays on aspirin    Raji Tan

## 2024-09-03 ENCOUNTER — OFFICE VISIT (OUTPATIENT)
Dept: UROLOGY | Facility: CLINIC | Age: 69
End: 2024-09-03
Payer: MEDICARE

## 2024-09-03 VITALS
WEIGHT: 250.25 LBS | DIASTOLIC BLOOD PRESSURE: 81 MMHG | SYSTOLIC BLOOD PRESSURE: 143 MMHG | HEART RATE: 62 BPM | BODY MASS INDEX: 33.01 KG/M2 | OXYGEN SATURATION: 100 % | RESPIRATION RATE: 18 BRPM

## 2024-09-03 DIAGNOSIS — N40.1 BPH WITH URINARY OBSTRUCTION: Primary | ICD-10-CM

## 2024-09-03 DIAGNOSIS — N13.8 BPH WITH URINARY OBSTRUCTION: Primary | ICD-10-CM

## 2024-09-03 LAB
BILIRUB SERPL-MCNC: NEGATIVE MG/DL
BLOOD URINE, POC: NEGATIVE
CLARITY, POC UA: CLEAR
COLOR, POC UA: YELLOW
GLUCOSE UR QL STRIP: NEGATIVE
KETONES UR QL STRIP: NEGATIVE
LEUKOCYTE ESTERASE URINE, POC: NEGATIVE
NITRITE, POC UA: NEGATIVE
PH, POC UA: 5.5
PROTEIN, POC: NORMAL
SPECIFIC GRAVITY, POC UA: 1.03
UROBILINOGEN, POC UA: 0.2

## 2024-09-03 PROCEDURE — 81002 URINALYSIS NONAUTO W/O SCOPE: CPT | Mod: S$GLB,,, | Performed by: UROLOGY

## 2024-09-03 PROCEDURE — 99214 OFFICE O/P EST MOD 30 MIN: CPT | Mod: S$GLB,,, | Performed by: UROLOGY

## 2024-09-03 PROCEDURE — 3044F HG A1C LEVEL LT 7.0%: CPT | Mod: CPTII,S$GLB,, | Performed by: UROLOGY

## 2024-09-03 PROCEDURE — 3077F SYST BP >= 140 MM HG: CPT | Mod: CPTII,S$GLB,, | Performed by: UROLOGY

## 2024-09-03 PROCEDURE — 3079F DIAST BP 80-89 MM HG: CPT | Mod: CPTII,S$GLB,, | Performed by: UROLOGY

## 2024-09-03 PROCEDURE — 99999 PR PBB SHADOW E&M-EST. PATIENT-LVL II: CPT | Mod: PBBFAC,,, | Performed by: UROLOGY

## 2024-09-03 PROCEDURE — 3008F BODY MASS INDEX DOCD: CPT | Mod: CPTII,S$GLB,, | Performed by: UROLOGY

## 2024-09-03 NOTE — Clinical Note
See Dr Greene 5/2025.   Yousef, will be able to stop plavix in 5/2025 for simple prostatectomy?  He may have to be off for a few weeks.  He could stay on the aspirin.  Thanks, Karl Greene  [phone]

## 2024-09-04 ENCOUNTER — CLINICAL SUPPORT (OUTPATIENT)
Dept: CARDIAC REHAB | Facility: CLINIC | Age: 69
End: 2024-09-04
Payer: MEDICARE

## 2024-09-04 DIAGNOSIS — I25.10 CORONARY ATHEROSCLEROSIS OF NATIVE CORONARY ARTERY: ICD-10-CM

## 2024-09-04 DIAGNOSIS — Z95.5 STENTED CORONARY ARTERY: Primary | ICD-10-CM

## 2024-09-04 PROCEDURE — 93798 PHYS/QHP OP CAR RHAB W/ECG: CPT | Mod: S$GLB,,, | Performed by: INTERNAL MEDICINE

## 2024-09-06 ENCOUNTER — CLINICAL SUPPORT (OUTPATIENT)
Dept: CARDIAC REHAB | Facility: CLINIC | Age: 69
End: 2024-09-06
Payer: MEDICARE

## 2024-09-06 ENCOUNTER — DOCUMENTATION ONLY (OUTPATIENT)
Dept: CARDIAC REHAB | Facility: CLINIC | Age: 69
End: 2024-09-06

## 2024-09-06 DIAGNOSIS — Z95.5 STENTED CORONARY ARTERY: Primary | ICD-10-CM

## 2024-09-06 DIAGNOSIS — I25.10 ATHEROSCLEROSIS OF NATIVE CORONARY ARTERY OF NATIVE HEART, UNSPECIFIED WHETHER ANGINA PRESENT: ICD-10-CM

## 2024-09-06 NOTE — PROGRESS NOTES
Mr. Yan has completed 12 out of 36 exercise session of Phase II cardiac rehab.  A follow up reassessment will be completed at 24 sessions.    12 Session Follow Up     Cardiac Rehab Individual Treatment Plan - Reassessment      Patient Name: Yuriy Jerome MRN: [id number]   : 1955   Age: 68 y.o.   Primary Diagnosis: PTCA/STENT Date of Event: 24   EF: 70%  Risk Stratification: moderate  Referring Physician: TOSHIA   Exercise Assessment:     Angina with exercise?: No   ST Depression with Exercise?: No  Fall Risk: Yes   Assistive Devices:  independent  Mr. Yan stated at orientation there were some limitations to exercise due to bilateral flat feet which causes pain and some possible lower back pain.  Exercise modalities have been modified to meet the patient's needs and capabilities.  Comments on Progression: Mr. Yan is progressing well and his workloads will continue to be increased as he tolerates exercise intensity.    Exercise Plan:   Goals:  CR Exercise Goals: Attend Cardiac Rehab 3 times/week: In Progress  Home Aerobic Exercise: 2 additional days/week for 30-60 minutes: In Progress  Intensity of 12-15 on the Rate of Perceived Exertion (RPE) scale: In Progress  30% increase in entry estimated METS: 15.6 : In Progress  5 days/week for 30-60 minutes: In Progress  Demonstrate proper pulse taking technique: In Progress    Comments on Goal Progression:  Patient Consistency: consistent with attendance  Home exercise? Yes: Walking; Frequency: 2 non-rehab days per week; Duration 60 minutes  Patient reports intensity rate 11-14 on RPE scale  Patient is progressing steadily  Patient is Able to demonstrate proper pulse taking technique with or without a fitness tracker.      Intervention/Recommendations:   Discussed importance of regular attendance to cardiac rehab class    Exercise Prescription:  THR Range    Mode: Nustep  Elliptical   Frequency:  3 days/week   Duration:  30-60 minutes   Intensity:  12-15  RPE   Resistance Training:  Yes: 10 to 15 lb weights with 10-15 reps based on strength and range of motion and adjusted accordingly     Home Prescription:  Mode Aerobic exercise   Frequency: 2- 3 days/week   Duration: 30-60 minutes   Resistance Training: None     Education:  Arthritis; verbalizes understanding; Date: 8-19-24  Osteoporosis; verbalizes understanding; Date: 8-26-24  Exercise Terminology; verbalizes understanding; Date:  9-9-24    Comments:  I reviewed exercise recommendations with Mr. Yan.  I encouraged him to continue exercising at least 2 non-rehab days per week for at least 30 minutes.  He stated understanding.     The exercise prescription will continue to be adjusted based on tolerance of exercise intensity by patient.    Rula Banks MEd., CEP

## 2024-09-09 ENCOUNTER — CLINICAL SUPPORT (OUTPATIENT)
Dept: CARDIAC REHAB | Facility: CLINIC | Age: 69
End: 2024-09-09
Payer: MEDICARE

## 2024-09-09 DIAGNOSIS — I25.10 ATHEROSCLEROSIS OF NATIVE CORONARY ARTERY OF NATIVE HEART, UNSPECIFIED WHETHER ANGINA PRESENT: ICD-10-CM

## 2024-09-09 DIAGNOSIS — Z95.5 STENTED CORONARY ARTERY: Primary | ICD-10-CM

## 2024-09-09 PROCEDURE — 93798 PHYS/QHP OP CAR RHAB W/ECG: CPT | Mod: S$GLB,,, | Performed by: INTERNAL MEDICINE

## 2024-09-09 NOTE — PROGRESS NOTES
12 Session Follow Up   Cardiac Rehab Individual Treatment Plan - Reassessment    Patient Name: Yuriy Jerome MRN: [id number]   : 1955   Age: 68 y.o.   Primary Diagnosis: PTCA/stent    Nutrition Assessment:     Anthropometrics    Height 73 inches   Weight 247 lbs   BMI 32.6     Patient confirms he is taking Crestor 20mg for cholesterol control.  Difficulty Chewing or Swallowing: No  Current Exercise: See Exercise Physiologist Note  Food Safety/Food Preparation: spouse  Living Arrangements/Family Support: Lives with spouse  Cultural/Spiritual/Personal Preferences: not applicable   Barriers to Education: none identified  Stage of Change Related to Diet Habits: Maintenance  Recent Changes to Diet: No  Food Diary: Given      Nutrition Plan:   Goals:  LDL-C < 70 (for high risk patients)  Hgb A1c < 7%  BMI < 25 and abdominal girth < 40M/<35 F  2 gram sodium, Mediterranean diet: In Progress  Rehab weight loss goal of 10 lbs, 1 lb per week: In Progress  Fish intake (non-fried varieties) to a goal of 2-3 servings per week: Met  Increase fruit and vegetable intake: In Progress    Comments on Goal Progression:  Pt states he feels well, eats fish several times weekly- no diet changes    Interventions:  Dietitian Consult: No  Patient to participate in Cardiac Rehab sessions three times a week  Weekly Dietitian Weight Check  Nutrition Recommendations Provided: Verbal, Reviewed  Follow Up Plan for Ongoing Self-Management Support    Education:  Cholesterol and Fat; verbalizes understanding; Date: 24  Protein; verbalizes understanding; Date: 24  Vitamins; verbalizes understanding; Date: 24  Food Additives; verbalizes understanding; Date: 24    Comments:   Discussed ways to incorporate healthy snacks, eating on a schedule, and monitoring sodium intake for heart health.    Diabetes  Is the patient diabetic? Yes   Other Core Components/Diabetes Assessment:   Labs:  Lab Results   Component Value Date    GLUF 140  (H) 08/06/2024     Lab Results   Component Value Date    HGBA1C 6.4 (H) 08/06/2024    HGBA1C 6.8 (H) 04/01/2024    HGBA1C 7.1 (H) 09/08/2023      Lab Results   Component Value Date    ESTIMATEDAVG 137 (H) 08/06/2024    ESTIMATEDAVG 148 (H) 04/01/2024    ESTIMATEDAVG 157 (H) 09/08/2023       History of diabetes since 2023  Diabetes medications: metformin 1000mg BID  Blood Glucose Checks at Home: No  Endocrinologist or PCP following DM: Dr. Jerome PCP    Other Core Components/Diabetes Plan:   Goals:  Hgb A1c < 7%  Exercise Blood Glucose: 100-300 mg/dl    Interventions:  Reviewed and Discussed Labs  Medication Compliance  Med Card Reconciled  Low Sodium, Mediterranean, ADA Diet  Weight Management  Physical Activity  Increase Knowledge of Contributory Factors  Home Monitoring    Education:  Risk Factors; verbalizes understanding; Date: 9/6/24    Comments:   Patient verbalizes understanding to bring home glucometer and check glucose pre and post each exercise session.  Per cardiac rehab protocols, patient's glucose must be between 90 and 270 mg/dL to exercise.  Patient denies any recent glucose levels less than 60 mg/dL or greater than 300 mg/dL. Patient verbalizes importance of notifying rehab staff if symptoms of hypoglycemia occur while at cardiac rehab.  Abnormal labs will be reported to patient's PCP/Endocrinologist by rehab staff.    Pt's glucose remains within acceptable parameters while at rehab  Emphasized importance of home monitoring  Pt does not wish to attend DM education for glucometer training but has been encouraged to do so    Em Herman MS, RDN/LDN

## 2024-09-13 ENCOUNTER — CLINICAL SUPPORT (OUTPATIENT)
Dept: CARDIAC REHAB | Facility: CLINIC | Age: 69
End: 2024-09-13
Payer: MEDICARE

## 2024-09-13 DIAGNOSIS — Z95.5 STENTED CORONARY ARTERY: Primary | ICD-10-CM

## 2024-09-13 DIAGNOSIS — I25.10 ATHEROSCLEROSIS OF NATIVE CORONARY ARTERY OF NATIVE HEART, UNSPECIFIED WHETHER ANGINA PRESENT: ICD-10-CM

## 2024-09-16 ENCOUNTER — CLINICAL SUPPORT (OUTPATIENT)
Dept: CARDIAC REHAB | Facility: CLINIC | Age: 69
End: 2024-09-16
Payer: MEDICARE

## 2024-09-16 DIAGNOSIS — Z95.5 STENTED CORONARY ARTERY: Primary | ICD-10-CM

## 2024-09-16 DIAGNOSIS — I25.10 ATHEROSCLEROSIS OF NATIVE CORONARY ARTERY OF NATIVE HEART WITHOUT ANGINA PECTORIS: ICD-10-CM

## 2024-09-16 PROCEDURE — 93798 PHYS/QHP OP CAR RHAB W/ECG: CPT | Mod: S$GLB,,, | Performed by: INTERNAL MEDICINE

## 2024-09-16 NOTE — PROGRESS NOTES
Session: 12 Session Follow Up   Cardiac Rehab Individual Treatment Plan - Reassessment      Patient Name: Yuriy Jerome MRN: [id number]   : 1955   Age: 68 y.o.   Primary Diagnosis: S/P PTCA 2024      Psychosocial Assessment:   Living Arrangements: Lives with spouse  Family Support: children and spouse  Self Reported: no change Effective Coping Skills  Displays: happiness and calmness  Medication: not applicable    Psychosocial Plan:   Goals:  Verbalizes coping mechanisms: Met  Maintain positive support system: Met  Maintain positive outlook: Met  Improve overall quality of life: In Progress    Comments on Goal Progression:  Pt denies any overwhelming stress or anxiety stating that since he is retired he no longer has a problem with this.    Interventions:  Patient to Self Report Emotional Changes at Session Check In  Recommend Physical Activity  Recommend Attending Education Lectures  Notify MD: No  Program Referral: No  Pharmaceutical Intervention/Therapy: No  Other Needs: not applicable  Stage of Readiness to Change: Action    Education:  Risk factors; verbalizes understanding; Date: 2024      Patient has been instructed to notify staff in the event that circumstances worsen.  Patient verbalizes understanding.    Other Core Components/Risk Factors Assessment:   RISK FACTORS:  diabetes, hyperlipidemia, hypertension, obesity, positive family history, sedentary lifestyle    Learning Barriers: None    Medication Compliance: has been compliant with taking medications    Other Core Components/Risk Factors Plan:   Goals:  Decrease cholesterol level: Met  Increase exercise tolerance: In Progress  Increase knowledge of CAD: In Progress  Decrease blood pressure: Not Met: Pt has blood pressures that are not consistent with <130/80  Weight loss: Met  Control diabetes by adjusting diet and exercise: Met    Comments on Goal Progression:  Pt has been consistent with attending cardiac rehab and with managing his  diabetes. Pt states he has lost 4lbs since beginning cardiac rehab in addition to his weight loss prior to attending cardiac rehab. Pt states his endurance for exercise has improved and that he is exercising on days that he is not here.     Interventions:  Individual Education/ Counseling: Yes  Physician Referral: No    Education:    cardiac interventions, verbalizes understanding; Date: 8/23/2024  fluid overload/CHF, verbalizes understanding; Date: 8/30/2024  risk factors, verbalizes understanding; Date: 9/06/2024  warning signs of MI, verbalizes understanding; Date: 8/16/2024         Education method adapted to patients education level and preferred method of learning.  Method: explanation  handouts        Other Core Components/Hypertension Assessment:   BP Range: 146-190 systolic;  diastolic  BP at Goal: No  Patient reported symptoms: none    Medications:  Medication Prescribed? Adherent? Exception   Beta-blocker []Yes  [x]No  []Unknown []Yes  []No  []Unknown    ACEI/ARB [x]Yes  []No  []Unknown [x]Yes  []No  []Unknown    Calcium Channel Blocker [x]Yes  []No  []Unknown [x]Yes  []No  []Unknown    Diuretic [x]Yes  []No  []Unknown [x]Yes  []No  []Unknown        Other Core Components/Hypertension Plan:   Goals:  Blood Pressure <130/80    Comments on Goal Progression:  Pt continues to have elevated blood pressures at rest. Will send message to PCP.    Interventions:  Med Card Reconciled: Yes  Encourage medication compliance  Encourage sodium reduction  Reduce alcohol consumption  Encourage weight loss  Recommend physical activity  Educate on contributory factors  Reduce stress, anxiety, anger, depression, and/or chronic pain  Encourage home blood pressure monitoring  Recommend daily weights  Enroll in Digital Hypertension Program  MD notified/Physician Referral: Yes    Education:    Congestive Heart Failure; verbalizes understanding; Date: 8/30/2024  Risk Factors; verbalizes understanding; Date: 9/06/2024              Does the patient have Heart Failure? No      Other Core Components/Tobacco Cessation Assessment:   Smoking Status: Lifetime Non-smoker  Primary Tobacco Type: N/A  Tobacco Usage: no  Smoking Cessation Barriers:  N/A  Stage of Readiness to Change: Maintenance    Other Core Components/Tobacco Cessation Plan:   Goals:  Maintain non-smoking status    Comments on Goal Progression:  Pt does not smoke    Interventions:  Maintains non-smoking status    Education:    Risk Factors; verbalizes understanding; Date: 9/06/2024          Comments:   Pt verbalizes no desire to use tobacco products.    Omid Martinez RN

## 2024-09-18 ENCOUNTER — CLINICAL SUPPORT (OUTPATIENT)
Dept: CARDIAC REHAB | Facility: CLINIC | Age: 69
End: 2024-09-18
Payer: MEDICARE

## 2024-09-18 DIAGNOSIS — I25.10 CORONARY ATHEROSCLEROSIS OF NATIVE CORONARY ARTERY: ICD-10-CM

## 2024-09-18 DIAGNOSIS — Z95.5 STENTED CORONARY ARTERY: Primary | ICD-10-CM

## 2024-09-18 PROCEDURE — 93798 PHYS/QHP OP CAR RHAB W/ECG: CPT | Mod: S$GLB,,, | Performed by: INTERNAL MEDICINE

## 2024-09-19 ENCOUNTER — DOCUMENTATION ONLY (OUTPATIENT)
Dept: CARDIAC REHAB | Facility: CLINIC | Age: 69
End: 2024-09-19
Payer: MEDICARE

## 2024-09-19 ENCOUNTER — TELEPHONE (OUTPATIENT)
Dept: CARDIOLOGY | Facility: CLINIC | Age: 69
End: 2024-09-19
Payer: MEDICARE

## 2024-09-19 DIAGNOSIS — I10 ESSENTIAL HYPERTENSION: Primary | ICD-10-CM

## 2024-09-19 RX ORDER — AMLODIPINE BESYLATE 10 MG/1
10 TABLET ORAL DAILY
Qty: 90 TABLET | Refills: 3 | Status: SHIPPED | OUTPATIENT
Start: 2024-09-19 | End: 2025-09-19

## 2024-09-19 NOTE — TELEPHONE ENCOUNTER
Dr. Noble,  The patient is currently attending cardiac rehab. According to the [employer] Association and cardiac rehab accreditation guidelines, the target blood pressure should be <130/80. The patient does not meet these guidelines. Please review the patient's blood pressure in Epic under card procedures then cardiac rehab sessions on date 09/09/2024 in the session summary. Please advise the patient further.   Omid Martinez RN  [facility]  [phone]    BLOOD PRESSURE READINGS FROM 09/09/2024

## 2024-09-19 NOTE — PROGRESS NOTES
Mr. Yan has completed 15 out of 36 exercise session of Phase II cardiac rehab.  A follow up reassessment will be completed at 24 sessions.    12 Session Follow Up     Cardiac Rehab Individual Treatment Plan - Reassessment      Patient Name: Yuriy Jerome MRN: [id number]   : 1955   Age: 68 y.o.   Primary Diagnosis: PTCA/STENT Date of Event: 24   EF: 70%  Risk Stratification: moderate  Referring Physician: TOSHIA   Exercise Assessment:     Angina with exercise?: No   ST Depression with Exercise?: No  Fall Risk: Yes   Assistive Devices:  independent  Mr. Yan stated at orientation there were some limitations to exercise due to bilateral flat feet which causes pain and some possible lower back pain.  Exercise modalities have been modified to meet the patient's needs and capabilities.  Comments on Progression: Mr. Yan is progressing well and his workloads will continue to be increased as he tolerates exercise intensity.    Exercise Plan:   Goals:  CR Exercise Goals: Attend Cardiac Rehab 3 times/week: In Progress  Home Aerobic Exercise: 2 additional days/week for 30-60 minutes: In Progress  Intensity of 12-15 on the Rate of Perceived Exertion (RPE) scale: In Progress  30% increase in entry estimated METS: 15.6 : In Progress  5 days/week for 30-60 minutes: In Progress  Demonstrate proper pulse taking technique: In Progress    Comments on Goal Progression:  Patient Consistency: consistent with attendance  Home exercise? Yes: Walking; Frequency: 2 non-rehab days per week; Duration 60 minutes  Patient reports intensity rate 11-14 on RPE scale  Patient is progressing steadily  Patient is Able to demonstrate proper pulse taking technique with or without a fitness tracker.      Intervention/Recommendations:   Discussed importance of regular attendance to cardiac rehab class    Exercise Prescription:  THR Range    Mode: Nustep  Elliptical   Frequency:  3 days/week   Duration:  30-60 minutes   Intensity:  12-15  RPE   Resistance Training:  Yes: 10 to 15 lb weights with 10-15 reps based on strength and range of motion and adjusted accordingly     Home Prescription:  Mode Aerobic exercise   Frequency: 2- 3 days/week   Duration: 30-60 minutes   Resistance Training: None     Education:  Arthritis; verbalizes understanding; Date: 24  Osteoporosis; verbalizes understanding; Date: 24  Exercise Terminology; verbalizes understanding; Date:  24    Comments:  I reviewed exercise recommendations with Mr. Yan.  I encouraged him to continue exercising at least 2 non-rehab days per week for at least 30 minutes.  He stated understanding.     The exercise prescription will continue to be adjusted based on tolerance of exercise intensity by patient.    Stanislav Howard, CEP    12 Session Follow Up   Cardiac Rehab Individual Treatment Plan - Reassessment    Patient Name: Yuriy Jerome MRN: [id number]   : 1955   Age: 68 y.o.   Primary Diagnosis: PTCA/stent    Nutrition Assessment:     Anthropometrics    Height 73 inches   Weight 247 lbs   BMI 32.6     Patient confirms he is taking Crestor 20mg for cholesterol control.  Difficulty Chewing or Swallowing: No  Current Exercise: See Exercise Physiologist Note  Food Safety/Food Preparation: spouse  Living Arrangements/Family Support: Lives with spouse  Cultural/Spiritual/Personal Preferences: not applicable   Barriers to Education: none identified  Stage of Change Related to Diet Habits: Maintenance  Recent Changes to Diet: No  Food Diary: Given      Nutrition Plan:   Goals:  LDL-C < 70 (for high risk patients)  Hgb A1c < 7%  BMI < 25 and abdominal girth < 40M/<35 F  2 gram sodium, Mediterranean diet: In Progress  Rehab weight loss goal of 10 lbs, 1 lb per week: In Progress  Fish intake (non-fried varieties) to a goal of 2-3 servings per week: Met  Increase fruit and vegetable intake: In Progress    Comments on Goal Progression:  Pt states he feels well, eats fish several  times weekly- no diet changes    Interventions:  Dietitian Consult: No  Patient to participate in Cardiac Rehab sessions three times a week  Weekly Dietitian Weight Check  Nutrition Recommendations Provided: Verbal, Reviewed  Follow Up Plan for Ongoing Self-Management Support    Education:  Cholesterol and Fat; verbalizes understanding; Date: 9/4/24  Protein; verbalizes understanding; Date: 8/28/24  Vitamins; verbalizes understanding; Date: 8/21/24  Food Additives; verbalizes understanding; Date: 8/14/24    Comments:   Discussed ways to incorporate healthy snacks, eating on a schedule, and monitoring sodium intake for heart health.    Diabetes  Is the patient diabetic? Yes   Other Core Components/Diabetes Assessment:   Labs:  Lab Results   Component Value Date    GLUF 140 (H) 08/06/2024     Lab Results   Component Value Date    HGBA1C 6.4 (H) 08/06/2024    HGBA1C 6.8 (H) 04/01/2024    HGBA1C 7.1 (H) 09/08/2023      Lab Results   Component Value Date    ESTIMATEDAVG 137 (H) 08/06/2024    ESTIMATEDAVG 148 (H) 04/01/2024    ESTIMATEDAVG 157 (H) 09/08/2023       History of diabetes since 2023  Diabetes medications: metformin 1000mg BID  Blood Glucose Checks at Home: No  Endocrinologist or PCP following DM: Dr. Jerome PCP    Other Core Components/Diabetes Plan:   Goals:  Hgb A1c < 7%  Exercise Blood Glucose: 100-300 mg/dl    Interventions:  Reviewed and Discussed Labs  Medication Compliance  Med Card Reconciled  Low Sodium, Mediterranean, ADA Diet  Weight Management  Physical Activity  Increase Knowledge of Contributory Factors  Home Monitoring    Education:  Risk Factors; verbalizes understanding; Date: 9/6/24    Comments:   Patient verbalizes understanding to bring home glucometer and check glucose pre and post each exercise session.  Per cardiac rehab protocols, patient's glucose must be between 90 and 270 mg/dL to exercise.  Patient denies any recent glucose levels less than 60 mg/dL or greater than 300 mg/dL.  Patient verbalizes importance of notifying rehab staff if symptoms of hypoglycemia occur while at cardiac rehab.  Abnormal labs will be reported to patient's PCP/Endocrinologist by rehab staff.    Pt's glucose remains within acceptable parameters while at rehab  Emphasized importance of home monitoring  Pt does not wish to attend DM education for glucometer training but has been encouraged to do so    Em Herman MS, RDN/LDN    Session: 12 Session Follow Up   Cardiac Rehab Individual Treatment Plan - Reassessment      Patient Name: Yuriy Jerome MRN: [id number]   : 1955   Age: 68 y.o.   Primary Diagnosis: S/P PTCA 2024      Psychosocial Assessment:   Living Arrangements: Lives with spouse  Family Support: children and spouse  Self Reported: no change Effective Coping Skills  Displays: happiness and calmness  Medication: not applicable    Psychosocial Plan:   Goals:  Verbalizes coping mechanisms: Met  Maintain positive support system: Met  Maintain positive outlook: Met  Improve overall quality of life: In Progress    Comments on Goal Progression:  Pt denies any overwhelming stress or anxiety stating that since he is retired he no longer has a problem with this.    Interventions:  Patient to Self Report Emotional Changes at Session Check In  Recommend Physical Activity  Recommend Attending Education Lectures  Notify MD: No  Program Referral: No  Pharmaceutical Intervention/Therapy: No  Other Needs: not applicable  Stage of Readiness to Change: Action    Education:  Risk factors; verbalizes understanding; Date: 2024      Patient has been instructed to notify staff in the event that circumstances worsen.  Patient verbalizes understanding.    Other Core Components/Risk Factors Assessment:   RISK FACTORS:  diabetes, hyperlipidemia, hypertension, obesity, positive family history, sedentary lifestyle    Learning Barriers: None    Medication Compliance: has been compliant with taking medications    Other  Core Components/Risk Factors Plan:   Goals:  Decrease cholesterol level: Met  Increase exercise tolerance: In Progress  Increase knowledge of CAD: In Progress  Decrease blood pressure: Not Met: Pt has blood pressures that are not consistent with <130/80  Weight loss: Met  Control diabetes by adjusting diet and exercise: Met    Comments on Goal Progression:  Pt has been consistent with attending cardiac rehab and with managing his diabetes. Pt states he has lost 4lbs since beginning cardiac rehab in addition to his weight loss prior to attending cardiac rehab. Pt states his endurance for exercise has improved and that he is exercising on days that he is not here.     Interventions:  Individual Education/ Counseling: Yes  Physician Referral: No    Education:    cardiac interventions, verbalizes understanding; Date: 8/23/2024  fluid overload/CHF, verbalizes understanding; Date: 8/30/2024  risk factors, verbalizes understanding; Date: 9/06/2024  warning signs of MI, verbalizes understanding; Date: 8/16/2024         Education method adapted to patients education level and preferred method of learning.  Method: explanation  handouts        Other Core Components/Hypertension Assessment:   BP Range: 146-190 systolic;  diastolic  BP at Goal: No  Patient reported symptoms: none    Medications:  Medication Prescribed? Adherent? Exception   Beta-blocker []Yes  [x]No  []Unknown []Yes  []No  []Unknown    ACEI/ARB [x]Yes  []No  []Unknown [x]Yes  []No  []Unknown    Calcium Channel Blocker [x]Yes  []No  []Unknown [x]Yes  []No  []Unknown    Diuretic [x]Yes  []No  []Unknown [x]Yes  []No  []Unknown        Other Core Components/Hypertension Plan:   Goals:  Blood Pressure <130/80    Comments on Goal Progression:  Pt continues to have elevated blood pressures at rest. Will send message to PCP.    Interventions:  Med Card Reconciled: Yes  Encourage medication compliance  Encourage sodium reduction  Reduce alcohol  consumption  Encourage weight loss  Recommend physical activity  Educate on contributory factors  Reduce stress, anxiety, anger, depression, and/or chronic pain  Encourage home blood pressure monitoring  Recommend daily weights  Enroll in Digital Hypertension Program  MD notified/Physician Referral: Yes    Education:    Congestive Heart Failure; verbalizes understanding; Date: 8/30/2024  Risk Factors; verbalizes understanding; Date: 9/06/2024             Does the patient have Heart Failure? No      Other Core Components/Tobacco Cessation Assessment:   Smoking Status: Lifetime Non-smoker  Primary Tobacco Type: N/A  Tobacco Usage: no  Smoking Cessation Barriers:  N/A  Stage of Readiness to Change: Maintenance    Other Core Components/Tobacco Cessation Plan:   Goals:  Maintain non-smoking status    Comments on Goal Progression:  Pt does not smoke    Interventions:  Maintains non-smoking status    Education:    Risk Factors; verbalizes understanding; Date: 9/06/2024          Comments:   Pt verbalizes no desire to use tobacco products.    Omid Martinez,RN

## 2024-09-19 NOTE — TELEPHONE ENCOUNTER
I discussed with him.  BP at home runs okay.??  White coat element.  He will double amlodipine dose to 10 mg.  We will take BP machine with him for calibration in the rehab center.

## 2024-09-20 ENCOUNTER — CLINICAL SUPPORT (OUTPATIENT)
Dept: CARDIAC REHAB | Facility: CLINIC | Age: 69
End: 2024-09-20
Payer: MEDICARE

## 2024-09-20 DIAGNOSIS — Z95.5 STENTED CORONARY ARTERY: Primary | ICD-10-CM

## 2024-09-20 DIAGNOSIS — I25.10 ATHEROSCLEROSIS OF NATIVE CORONARY ARTERY OF NATIVE HEART, UNSPECIFIED WHETHER ANGINA PRESENT: ICD-10-CM

## 2024-09-23 ENCOUNTER — CLINICAL SUPPORT (OUTPATIENT)
Dept: CARDIAC REHAB | Facility: CLINIC | Age: 69
End: 2024-09-23
Payer: MEDICARE

## 2024-09-23 DIAGNOSIS — Z95.5 STENTED CORONARY ARTERY: Primary | ICD-10-CM

## 2024-09-23 DIAGNOSIS — I25.10 ATHEROSCLEROSIS OF NATIVE CORONARY ARTERY OF NATIVE HEART, UNSPECIFIED WHETHER ANGINA PRESENT: ICD-10-CM

## 2024-09-23 PROCEDURE — 93798 PHYS/QHP OP CAR RHAB W/ECG: CPT | Mod: S$GLB,,, | Performed by: INTERNAL MEDICINE

## 2024-09-23 NOTE — PROGRESS NOTES
Patient arrived for cardiac rehab session. Pt reports to have eaten prior to exercise session.  The patient was on continuous EKG monitoring throughout the session. The patient tolerated exercise session well with no complaints.     Has The Patient Been Vaccinated For Covid-19?: Yes Which Covid 19 Vaccine Did Patient Receive (Optional)?: Pfizer Detail Level: Simple Previous Infection Text: The patient has recovered from a previous COVID-19 infection.\\n2/20, maybe also 10/20 Has The Patient Been Infected With Covid-19 In The Past?: No

## 2024-09-25 ENCOUNTER — CLINICAL SUPPORT (OUTPATIENT)
Dept: CARDIAC REHAB | Facility: CLINIC | Age: 69
End: 2024-09-25
Payer: MEDICARE

## 2024-09-25 DIAGNOSIS — Z95.5 STENTED CORONARY ARTERY: Primary | ICD-10-CM

## 2024-09-25 DIAGNOSIS — I25.10 ATHEROSCLEROSIS OF NATIVE CORONARY ARTERY OF NATIVE HEART WITHOUT ANGINA PECTORIS: ICD-10-CM

## 2024-09-25 PROCEDURE — 93798 PHYS/QHP OP CAR RHAB W/ECG: CPT | Mod: S$GLB,,, | Performed by: INTERNAL MEDICINE

## 2024-09-27 ENCOUNTER — CLINICAL SUPPORT (OUTPATIENT)
Dept: CARDIAC REHAB | Facility: CLINIC | Age: 69
End: 2024-09-27
Payer: MEDICARE

## 2024-09-27 DIAGNOSIS — Z95.5 STENTED CORONARY ARTERY: Primary | ICD-10-CM

## 2024-09-27 DIAGNOSIS — I25.10 CORONARY ARTERY DISEASE, UNSPECIFIED VESSEL OR LESION TYPE, UNSPECIFIED WHETHER ANGINA PRESENT, UNSPECIFIED WHETHER NATIVE OR TRANSPLANTED HEART: ICD-10-CM

## 2024-09-30 ENCOUNTER — CLINICAL SUPPORT (OUTPATIENT)
Dept: CARDIAC REHAB | Facility: CLINIC | Age: 69
End: 2024-09-30
Payer: MEDICARE

## 2024-09-30 DIAGNOSIS — I25.10 ATHEROSCLEROSIS OF NATIVE CORONARY ARTERY OF NATIVE HEART, UNSPECIFIED WHETHER ANGINA PRESENT: ICD-10-CM

## 2024-09-30 DIAGNOSIS — Z95.5 STENTED CORONARY ARTERY: Primary | ICD-10-CM

## 2024-09-30 PROCEDURE — 93798 PHYS/QHP OP CAR RHAB W/ECG: CPT | Mod: S$GLB,,, | Performed by: INTERNAL MEDICINE

## 2024-10-02 ENCOUNTER — CLINICAL SUPPORT (OUTPATIENT)
Dept: CARDIAC REHAB | Facility: CLINIC | Age: 69
End: 2024-10-02
Payer: MEDICARE

## 2024-10-02 ENCOUNTER — CLINICAL SUPPORT (OUTPATIENT)
Dept: CARDIOLOGY | Facility: HOSPITAL | Age: 69
End: 2024-10-02
Attending: INTERNAL MEDICINE
Payer: MEDICARE

## 2024-10-02 DIAGNOSIS — Z95.5 STENTED CORONARY ARTERY: Primary | ICD-10-CM

## 2024-10-02 DIAGNOSIS — R00.2 PALPITATIONS: ICD-10-CM

## 2024-10-02 DIAGNOSIS — I25.10 CORONARY ATHEROSCLEROSIS OF NATIVE CORONARY ARTERY: ICD-10-CM

## 2024-10-02 PROCEDURE — 93798 PHYS/QHP OP CAR RHAB W/ECG: CPT | Mod: S$GLB,,, | Performed by: INTERNAL MEDICINE

## 2024-10-02 PROCEDURE — 93271 ECG/MONITORING AND ANALYSIS: CPT

## 2024-10-07 ENCOUNTER — CLINICAL SUPPORT (OUTPATIENT)
Dept: CARDIAC REHAB | Facility: CLINIC | Age: 69
End: 2024-10-07
Payer: MEDICARE

## 2024-10-07 DIAGNOSIS — I25.10 ATHEROSCLEROSIS OF NATIVE CORONARY ARTERY OF NATIVE HEART WITHOUT ANGINA PECTORIS: ICD-10-CM

## 2024-10-07 DIAGNOSIS — Z95.5 STENTED CORONARY ARTERY: Primary | ICD-10-CM

## 2024-10-07 PROCEDURE — 93798 PHYS/QHP OP CAR RHAB W/ECG: CPT | Mod: S$GLB,,, | Performed by: INTERNAL MEDICINE

## 2024-10-08 ENCOUNTER — OFFICE VISIT (OUTPATIENT)
Dept: CARDIOLOGY | Facility: CLINIC | Age: 69
End: 2024-10-08
Payer: MEDICARE

## 2024-10-08 ENCOUNTER — LAB VISIT (OUTPATIENT)
Dept: LAB | Facility: HOSPITAL | Age: 69
End: 2024-10-08
Attending: INTERNAL MEDICINE
Payer: MEDICARE

## 2024-10-08 VITALS
HEIGHT: 73 IN | OXYGEN SATURATION: 97 % | SYSTOLIC BLOOD PRESSURE: 130 MMHG | HEART RATE: 62 BPM | WEIGHT: 246 LBS | BODY MASS INDEX: 32.6 KG/M2 | DIASTOLIC BLOOD PRESSURE: 72 MMHG

## 2024-10-08 DIAGNOSIS — I25.118 ATHEROSCLEROSIS OF NATIVE CORONARY ARTERY OF NATIVE HEART WITH STABLE ANGINA PECTORIS: Primary | ICD-10-CM

## 2024-10-08 DIAGNOSIS — E11.69 TYPE 2 DIABETES MELLITUS WITH OTHER SPECIFIED COMPLICATION, WITHOUT LONG-TERM CURRENT USE OF INSULIN: ICD-10-CM

## 2024-10-08 DIAGNOSIS — I10 ESSENTIAL HYPERTENSION: ICD-10-CM

## 2024-10-08 DIAGNOSIS — Z95.5 STENTED CORONARY ARTERY: ICD-10-CM

## 2024-10-08 DIAGNOSIS — E78.2 MIXED HYPERLIPIDEMIA: ICD-10-CM

## 2024-10-08 DIAGNOSIS — G47.33 OBSTRUCTIVE SLEEP APNEA: ICD-10-CM

## 2024-10-08 DIAGNOSIS — E66.9 OBESITY (BMI 35.0-39.9 WITHOUT COMORBIDITY): ICD-10-CM

## 2024-10-08 LAB
ALBUMIN SERPL BCP-MCNC: 4.2 G/DL (ref 3.5–5.2)
ALBUMIN/CREAT UR: 14.1 UG/MG (ref 0–30)
ALP SERPL-CCNC: 55 U/L (ref 55–135)
ALT SERPL W/O P-5'-P-CCNC: 22 U/L (ref 10–44)
ANION GAP SERPL CALC-SCNC: 9 MMOL/L (ref 8–16)
AST SERPL-CCNC: 17 U/L (ref 10–40)
BILIRUB SERPL-MCNC: 0.8 MG/DL (ref 0.1–1)
BUN SERPL-MCNC: 15 MG/DL (ref 8–23)
CALCIUM SERPL-MCNC: 10.2 MG/DL (ref 8.7–10.5)
CHLORIDE SERPL-SCNC: 104 MMOL/L (ref 95–110)
CHOLEST SERPL-MCNC: 108 MG/DL (ref 120–199)
CHOLEST/HDLC SERPL: 2.9 {RATIO} (ref 2–5)
CO2 SERPL-SCNC: 27 MMOL/L (ref 23–29)
CREAT SERPL-MCNC: 1 MG/DL (ref 0.5–1.4)
CREAT UR-MCNC: 163 MG/DL (ref 23–375)
ERYTHROCYTE [DISTWIDTH] IN BLOOD BY AUTOMATED COUNT: 12.8 % (ref 11.5–14.5)
EST. GFR  (NO RACE VARIABLE): >60 ML/MIN/1.73 M^2
ESTIMATED AVG GLUCOSE: 126 MG/DL (ref 68–131)
GLUCOSE SERPL-MCNC: 109 MG/DL (ref 70–110)
HBA1C MFR BLD: 6 % (ref 4–5.6)
HCT VFR BLD AUTO: 44.5 % (ref 40–54)
HDLC SERPL-MCNC: 37 MG/DL (ref 40–75)
HDLC SERPL: 34.3 % (ref 20–50)
HGB BLD-MCNC: 15 G/DL (ref 14–18)
LDLC SERPL CALC-MCNC: 45.4 MG/DL (ref 63–159)
MCH RBC QN AUTO: 31.9 PG (ref 27–31)
MCHC RBC AUTO-ENTMCNC: 33.7 G/DL (ref 32–36)
MCV RBC AUTO: 95 FL (ref 82–98)
MICROALBUMIN UR DL<=1MG/L-MCNC: 23 UG/ML
NONHDLC SERPL-MCNC: 71 MG/DL
PLATELET # BLD AUTO: 238 K/UL (ref 150–450)
PMV BLD AUTO: 10.5 FL (ref 9.2–12.9)
POTASSIUM SERPL-SCNC: 4.8 MMOL/L (ref 3.5–5.1)
PROT SERPL-MCNC: 6.8 G/DL (ref 6–8.4)
RBC # BLD AUTO: 4.7 M/UL (ref 4.6–6.2)
SODIUM SERPL-SCNC: 140 MMOL/L (ref 136–145)
TRIGL SERPL-MCNC: 128 MG/DL (ref 30–150)
WBC # BLD AUTO: 7.22 K/UL (ref 3.9–12.7)

## 2024-10-08 PROCEDURE — 99999 PR PBB SHADOW E&M-EST. PATIENT-LVL IV: CPT | Mod: PBBFAC,,, | Performed by: INTERNAL MEDICINE

## 2024-10-08 PROCEDURE — 83036 HEMOGLOBIN GLYCOSYLATED A1C: CPT | Performed by: INTERNAL MEDICINE

## 2024-10-08 PROCEDURE — 1159F MED LIST DOCD IN RCRD: CPT | Mod: CPTII,S$GLB,, | Performed by: INTERNAL MEDICINE

## 2024-10-08 PROCEDURE — 36415 COLL VENOUS BLD VENIPUNCTURE: CPT | Performed by: INTERNAL MEDICINE

## 2024-10-08 PROCEDURE — 3078F DIAST BP <80 MM HG: CPT | Mod: CPTII,S$GLB,, | Performed by: INTERNAL MEDICINE

## 2024-10-08 PROCEDURE — 80053 COMPREHEN METABOLIC PANEL: CPT | Performed by: INTERNAL MEDICINE

## 2024-10-08 PROCEDURE — 82043 UR ALBUMIN QUANTITATIVE: CPT | Performed by: INTERNAL MEDICINE

## 2024-10-08 PROCEDURE — 1160F RVW MEDS BY RX/DR IN RCRD: CPT | Mod: CPTII,S$GLB,, | Performed by: INTERNAL MEDICINE

## 2024-10-08 PROCEDURE — 82570 ASSAY OF URINE CREATININE: CPT | Performed by: INTERNAL MEDICINE

## 2024-10-08 PROCEDURE — 85027 COMPLETE CBC AUTOMATED: CPT | Performed by: INTERNAL MEDICINE

## 2024-10-08 PROCEDURE — 3288F FALL RISK ASSESSMENT DOCD: CPT | Mod: CPTII,S$GLB,, | Performed by: INTERNAL MEDICINE

## 2024-10-08 PROCEDURE — 99214 OFFICE O/P EST MOD 30 MIN: CPT | Mod: S$GLB,,, | Performed by: INTERNAL MEDICINE

## 2024-10-08 PROCEDURE — 3044F HG A1C LEVEL LT 7.0%: CPT | Mod: CPTII,S$GLB,, | Performed by: INTERNAL MEDICINE

## 2024-10-08 PROCEDURE — 1101F PT FALLS ASSESS-DOCD LE1/YR: CPT | Mod: CPTII,S$GLB,, | Performed by: INTERNAL MEDICINE

## 2024-10-08 PROCEDURE — 3075F SYST BP GE 130 - 139MM HG: CPT | Mod: CPTII,S$GLB,, | Performed by: INTERNAL MEDICINE

## 2024-10-08 PROCEDURE — 80061 LIPID PANEL: CPT | Performed by: INTERNAL MEDICINE

## 2024-10-08 PROCEDURE — 3008F BODY MASS INDEX DOCD: CPT | Mod: CPTII,S$GLB,, | Performed by: INTERNAL MEDICINE

## 2024-10-08 NOTE — PROGRESS NOTES
Subjective:   @Patient ID:  Yuriy Jerome is a 69 y.o. male who presents for evaluation of chest pain      HPI:   October 2024:  Follow up.  He is in cardiac rehab.  No recurrent exertional angina.  He is doing well.  BP  is well-controlled at home.  No significant palpitation.  Wearing 30 days event monitor due to concerns about 1 episode of tachycardia during cardiac rehab.  Pending thyroidectomy.     June 12, 2024:  follow up.  Stress test abnormal for ischemia prompted left heart catheterization 99% stenosis in the prox/mid LAD segment required 2 PRAVEENA with excellent results post.  He is doing okay.  Chest pain has improved.  He does have occasional atypical left-sided pain that is concerning for some element of pericarditis.  Worse when he lays flat.  EKG today sinus rhythm without any ischemic changes        May 13, 2024:  Pleasant 68 years old gentleman here for Initial visit with me for chest pain and risk stratification.  Overall he is doing well.  Plan to have a prostate surgery.  For the last 2 weeks he noticed 3 episodes of chest tightness toward the left side.  It was all brought in by exertion.  He did not have similar symptoms before.  No significant shortness of breath, lower extremity edema, or PND.  BP is well-controlled as well as his lipids.    PMH significant for type 2 diabetes mellitus, HLP, HTN, and LACHO    SH: No tobacco abuse    FH:  Family history of valvular heart disease    Prior cardiovascular  Hx  --------------------------------  Stress echo June 2024       Left Ventricle: The left ventricle is normal in size. There is normal systolic function.    Right Ventricle: Systolic function is normal.    Stress Protocol: The patient exercised for 7 minutes 22 seconds on a Garrison protocol, corresponding to a functional capacity of 9METS, achieving a peak heart rate of 141 bpm, which is 93% of the age predicted maximum heart rate. The patient experienced no angina during the test. The patient  reported no symptoms during the stress test. Patient reported mild chest discomfort during recovery. The test was stopped because the patient requested it and was experiencing back pain.    Baseline ECG: The Baseline ECG reveals sinus rhythm. The baseline ECG has non-specific ST-T wave changes. The initial ECG portion of the study with significant artifacts.    Stress ECG: There is of upward-sloping ST segment depression noted during stress. During stress, rare PVCs are noted. There is normal blood pressure response with stress.    ECG Conclusion: The ECG portion of the study is abnormal but not diagnostic due to resting ST-T abnormalities and significant artifact.    Post-stress Echo: The left ventricle systolic function is hyperdynamic. Right ventricle systolic function is normal.    Post-stress Impression: The study is normal, negative with no echocardiographic evidence of stress induced ischemia and not determinate due to indeterminate ECG.    [facility] June 1, 2024  Access: right radial with a 5-6 slender sheath  LM:  PATRICIA  3 flow.  0% stenosis  LAD: PATRICIA  3 flow.  Mid 99% stenosis, prox to mid segment diffuse calcified  50-60% stenosis  LCx:  PATRICIA  3 flow.  30-40% stenosis mid segment  RCA: PATRICIA  3 flow.  Mid RCA 20% stenosis.  Right dominant  LVgram: LVEDP 26 mm Hg     Intervention:    Status post successful PTCA and stent to prox/mid LAD with 4.0 x 20, 4.0 x 20 PRAVEENA in overlapping fashion post dilated with a 4.5 NC balloon at high pressure.  IVUS guided with excellent results.  We had to place the 2nd more proximal stent due to severe disease under IVUS with heavily calcification and significant plaque burden, the  placed initial stent  landing in the severe diseased segments.      Closure device: Vasc band  Patient tolerated procedure well, no complications        Assessment:    Severe single-vessel coronary artery disease as above   Status post successful PCI with 2 PRAVEENA with excellent results.  IVUS guided  intervention with the excellent results     Plan:    Aspirin and Plavix for 12 months   High-intensity statin with LDL goal lower than 70   Maximize medical therapy  - EKG May 2024 showed sinus rhythm with single PVCs.  No acute ischemic changes    - EKG May 13, 2022.  Sinus rhythm without any ischemic changes           Patient Active Problem List    Diagnosis Date Noted    Coronary atherosclerosis of native coronary artery 08/14/2024    Thyroid nodule 06/20/2024    Stented coronary artery 06/12/2024      Prox/mid LAD with 2 PRAVEENA 4.0 post dilated with 4.5 NC balloon      Morbid (severe) obesity due to excess calories 09/11/2023    Subluxation of tarsal joint of left foot 05/19/2022    Hyperlipidemia 12/15/2021    Essential hypertension 12/15/2021    Benign prostatic hyperplasia 12/15/2021    Obesity (BMI 35.0-39.9 without comorbidity) 12/15/2021    Type 2 diabetes mellitus with other specified complication, without long-term current use of insulin     Insomnia 08/24/2018    Obstructive sleep apnea 05/26/2016                    LAST HbA1c  Lab Results   Component Value Date    HGBA1C 6.4 (H) 08/06/2024       Lipid panel  Lab Results   Component Value Date    CHOL 140 08/06/2024    CHOL 137 09/08/2023    CHOL 133 08/17/2022     Lab Results   Component Value Date    HDL 38 (L) 08/06/2024    HDL 37 (L) 09/08/2023    HDL 41 08/17/2022     Lab Results   Component Value Date    LDLCALC 59.2 (L) 08/06/2024    LDLCALC 65.2 09/08/2023    LDLCALC 49.6 (L) 08/17/2022     Lab Results   Component Value Date    TRIG 214 (H) 08/06/2024    TRIG 174 (H) 09/08/2023    TRIG 212 (H) 08/17/2022     Lab Results   Component Value Date    CHOLHDL 27.1 08/06/2024    CHOLHDL 27.0 09/08/2023    CHOLHDL 30.8 08/17/2022            Review of Systems   Constitutional: Negative for chills and fever.   HENT:  Negative for hearing loss and nosebleeds.    Eyes:  Negative for blurred vision.   Cardiovascular:         As in HPI   Respiratory:  Negative  for hemoptysis and shortness of breath.    Hematologic/Lymphatic: Negative for bleeding problem.   Skin:  Negative for itching.   Musculoskeletal:  Negative for falls.   Gastrointestinal:  Negative for abdominal pain and hematochezia.   Genitourinary:  Negative for hematuria.   Neurological:  Negative for dizziness and loss of balance.   Psychiatric/Behavioral:  Negative for altered mental status and depression.        Objective:   Physical Exam  Constitutional:       Appearance: He is well-developed. He is obese.   HENT:      Head: Normocephalic and atraumatic.   Eyes:      Conjunctiva/sclera: Conjunctivae normal.   Neck:      Vascular: No carotid bruit or JVD.   Cardiovascular:      Rate and Rhythm: Normal rate and regular rhythm.      Pulses:           Carotid pulses are 2+ on the right side and 2+ on the left side.       Radial pulses are 2+ on the right side and 2+ on the left side.      Heart sounds: Normal heart sounds. No murmur heard.     No friction rub. No gallop.   Pulmonary:      Effort: Pulmonary effort is normal. No respiratory distress.      Breath sounds: Normal breath sounds. No stridor. No wheezing.   Abdominal:      General: Abdomen is flat.      Palpations: Abdomen is soft.   Musculoskeletal:      Cervical back: Neck supple.   Skin:     General: Skin is warm and dry.   Neurological:      Mental Status: He is alert and oriented to person, place, and time.   Psychiatric:         Behavior: Behavior normal.         Assessment:     1. Atherosclerosis of native coronary artery of native heart with stable angina pectoris    2. Essential hypertension    3. Mixed hyperlipidemia    4. Stented coronary artery          Plan:    Status successful PCI to prox/mid LAD for 99% stenosis.  Two PRAVEENA.   Continue medical therapy.  Aspirin and Plavix.   Plan to have thyroidectomy due to concerns about atypical cells.  Okay to hold Plavix 3-5 days before the procedure and restart post.  However he can not stop aspirin  preprocedure.  Needs to continue aspirin perioperatively.   Created proceed with planned surgery from cardiac standpoint    Continue high-intensity statin.  LDL at goal  BP well-controlled.  Continue current regimen  Continue cardiac rehab phase 2  Nitro pHemalrdanilo.  Tender spot in the back is musculoskeletal.  He will discuss with Dr. Queenie Piper pHemalr.n.    Have reviewed his EKG today.      Continue primary preventive measures with aspirin and statin.  LDL well-controlled    BP is well-controlled    Continue CPAP for LACHO    Six-month follow-up or sooner p.r.n.    Pertinent cardiac images and EKG reviewed independently.    Continue with current medical plan and lifestyle changes.  Return sooner for concerns or questions. If symptoms persist go to the ED  I have reviewed all pertinent data including patient's medical history in detail and updated the computerized patient record.     No orders of the defined types were placed in this encounter.      Follow up as scheduled.     He expressed verbal understanding and agreed with the plan    Patient's Medications   New Prescriptions    No medications on file   Previous Medications    ALPHA LIPOIC ACID 600 MG CAP    Take by mouth.    AMLODIPINE (NORVASC) 10 MG TABLET    Take 1 tablet (10 mg total) by mouth once daily.    ASPIRIN (ECOTRIN) 81 MG EC TABLET    Take 81 mg by mouth once daily.    BLOOD-GLUCOSE METER (ONETOUCH VERIO REFLECT START) KIT    Use as instructed    CLOPIDOGREL (PLAVIX) 75 MG TABLET    Take 1 tablet (75 mg total) by mouth once daily.    COQ10, UBIQUINOL, 200 MG CAP        FINASTERIDE (PROSCAR) 5 MG TABLET    Take 1 tablet (5 mg total) by mouth once daily.    FLUTICASONE PROPIONATE (FLONASE) 50 MCG/ACTUATION NASAL SPRAY    fluticasone propionate 50 mcg/actuation nasal spray,suspension    LANCETS (ONETOUCH DELICA PLUS LANCET) 30 GAUGE MISC    1 lancet  by Misc.(Non-Drug; Combo Route) route 2 (two) times a day.    LOSARTAN-HYDROCHLOROTHIAZIDE 100-12.5 MG  (HYZAAR) 100-12.5 MG TAB    TAKE 1 TABLET BY MOUTH EVERY DAY    METFORMIN (GLUCOPHAGE) 1000 MG TABLET    TAKE 1 TABLET(1000 MG) BY MOUTH TWICE DAILY WITH MEALS    MIRABEGRON (MYRBETRIQ) 50 MG TB24    Take 1 tablet (50 mg total) by mouth once daily.    MULTIVIT WITH MINERALS/LUTEIN (MULTIVITAMIN 50 PLUS ORAL)    Take by mouth.    NITROGLYCERIN (NITROSTAT) 0.4 MG SL TABLET    Place 1 tablet (0.4 mg total) under the tongue every 5 (five) minutes as needed for Chest pain.    OMEGA-3 FATTY ACIDS/FISH OIL (FISH OIL-OMEGA-3 FATTY ACIDS) 300-1,000 MG CAPSULE    Take by mouth once daily.    ONETOUCH VERIO TEST STRIPS STRP    USE 1 TEST STRIP TWICE DAILY    ROSUVASTATIN (CRESTOR) 20 MG TABLET    TAKE 1 TABLET(20 MG) BY MOUTH EVERY DAY    UNABLE TO FIND    osteobiflex   Modified Medications    No medications on file   Discontinued Medications    No medications on file

## 2024-10-09 ENCOUNTER — CLINICAL SUPPORT (OUTPATIENT)
Dept: CARDIAC REHAB | Facility: CLINIC | Age: 69
End: 2024-10-09
Payer: MEDICARE

## 2024-10-09 DIAGNOSIS — I25.10 ATHEROSCLEROSIS OF NATIVE CORONARY ARTERY OF NATIVE HEART, UNSPECIFIED WHETHER ANGINA PRESENT: ICD-10-CM

## 2024-10-09 DIAGNOSIS — Z95.5 STENTED CORONARY ARTERY: Primary | ICD-10-CM

## 2024-10-09 DIAGNOSIS — I25.10 CORONARY ATHEROSCLEROSIS OF NATIVE CORONARY ARTERY: ICD-10-CM

## 2024-10-09 DIAGNOSIS — E11.9 DIABETES MELLITUS WITHOUT COMPLICATION: ICD-10-CM

## 2024-10-09 PROCEDURE — 93798 PHYS/QHP OP CAR RHAB W/ECG: CPT | Mod: S$GLB,,, | Performed by: STUDENT IN AN ORGANIZED HEALTH CARE EDUCATION/TRAINING PROGRAM

## 2024-10-10 ENCOUNTER — OFFICE VISIT (OUTPATIENT)
Dept: PRIMARY CARE CLINIC | Facility: CLINIC | Age: 69
End: 2024-10-10
Payer: MEDICARE

## 2024-10-10 ENCOUNTER — DOCUMENTATION ONLY (OUTPATIENT)
Dept: CARDIAC REHAB | Facility: CLINIC | Age: 69
End: 2024-10-10
Payer: MEDICARE

## 2024-10-10 ENCOUNTER — PATIENT MESSAGE (OUTPATIENT)
Dept: PRIMARY CARE CLINIC | Facility: CLINIC | Age: 69
End: 2024-10-10

## 2024-10-10 VITALS
WEIGHT: 249.13 LBS | HEART RATE: 66 BPM | DIASTOLIC BLOOD PRESSURE: 74 MMHG | BODY MASS INDEX: 33.02 KG/M2 | HEIGHT: 73 IN | OXYGEN SATURATION: 97 % | SYSTOLIC BLOOD PRESSURE: 138 MMHG

## 2024-10-10 DIAGNOSIS — N40.0 BENIGN PROSTATIC HYPERPLASIA, UNSPECIFIED WHETHER LOWER URINARY TRACT SYMPTOMS PRESENT: ICD-10-CM

## 2024-10-10 DIAGNOSIS — I25.118 ATHEROSCLEROSIS OF NATIVE CORONARY ARTERY OF NATIVE HEART WITH STABLE ANGINA PECTORIS: Primary | ICD-10-CM

## 2024-10-10 DIAGNOSIS — E11.69 TYPE 2 DIABETES MELLITUS WITH OTHER SPECIFIED COMPLICATION, WITHOUT LONG-TERM CURRENT USE OF INSULIN: ICD-10-CM

## 2024-10-10 DIAGNOSIS — E04.1 THYROID NODULE: ICD-10-CM

## 2024-10-10 DIAGNOSIS — I10 ESSENTIAL HYPERTENSION: ICD-10-CM

## 2024-10-10 PROCEDURE — 99999 PR PBB SHADOW E&M-EST. PATIENT-LVL IV: CPT | Mod: PBBFAC,,, | Performed by: INTERNAL MEDICINE

## 2024-10-10 PROCEDURE — 3008F BODY MASS INDEX DOCD: CPT | Mod: CPTII,S$GLB,, | Performed by: INTERNAL MEDICINE

## 2024-10-10 PROCEDURE — 3078F DIAST BP <80 MM HG: CPT | Mod: CPTII,S$GLB,, | Performed by: INTERNAL MEDICINE

## 2024-10-10 PROCEDURE — 99214 OFFICE O/P EST MOD 30 MIN: CPT | Mod: S$GLB,,, | Performed by: INTERNAL MEDICINE

## 2024-10-10 PROCEDURE — 3075F SYST BP GE 130 - 139MM HG: CPT | Mod: CPTII,S$GLB,, | Performed by: INTERNAL MEDICINE

## 2024-10-10 PROCEDURE — 3044F HG A1C LEVEL LT 7.0%: CPT | Mod: CPTII,S$GLB,, | Performed by: INTERNAL MEDICINE

## 2024-10-10 PROCEDURE — 3061F NEG MICROALBUMINURIA REV: CPT | Mod: CPTII,S$GLB,, | Performed by: INTERNAL MEDICINE

## 2024-10-10 PROCEDURE — 3066F NEPHROPATHY DOC TX: CPT | Mod: CPTII,S$GLB,, | Performed by: INTERNAL MEDICINE

## 2024-10-10 NOTE — PROGRESS NOTES
Mr. Yan has completed 23 out of 36 exercise session of Phase II cardiac rehab.  A follow up reassessment will be completed at 24 sessions.    12 Session Follow Up     Cardiac Rehab Individual Treatment Plan - Reassessment      Patient Name: Yuriy Jerome MRN: [id number]   : 1955   Age: 69 y.o.   Primary Diagnosis: PTCA/STENT Date of Event: 24   EF: 70%  Risk Stratification: moderate  Referring Physician: TOSHIA   Exercise Assessment:     Angina with exercise?: No   ST Depression with Exercise?: No  Fall Risk: Yes   Assistive Devices:  independent  Mr. Yan stated at orientation there were some limitations to exercise due to bilateral flat feet which causes pain and some possible lower back pain.  Exercise modalities have been modified to meet the patient's needs and capabilities.  Comments on Progression: Mr. Yan is progressing well and his workloads will continue to be increased as he tolerates exercise intensity.    Exercise Plan:   Goals:  CR Exercise Goals: Attend Cardiac Rehab 3 times/week: In Progress  Home Aerobic Exercise: 2 additional days/week for 30-60 minutes: In Progress  Intensity of 12-15 on the Rate of Perceived Exertion (RPE) scale: In Progress  30% increase in entry estimated METS: 15.6 : In Progress  5 days/week for 30-60 minutes: In Progress  Demonstrate proper pulse taking technique: In Progress    Comments on Goal Progression:  Patient Consistency: consistent with attendance  Home exercise? Yes: Walking; Frequency: 2 non-rehab days per week; Duration 60 minutes  Patient reports intensity rate 11-14 on RPE scale  Patient is progressing steadily  Patient is Able to demonstrate proper pulse taking technique with or without a fitness tracker.      Intervention/Recommendations:   Discussed importance of regular attendance to cardiac rehab class    Exercise Prescription:  THR Range    Mode: Nustep  Elliptical   Frequency:  3 days/week   Duration:  30-60 minutes   Intensity:  12-15  RPE   Resistance Training:  Yes: 10 to 15 lb weights with 10-15 reps based on strength and range of motion and adjusted accordingly     Home Prescription:  Mode Aerobic exercise   Frequency: 2- 3 days/week   Duration: 30-60 minutes   Resistance Training: None     Education:  Arthritis; verbalizes understanding; Date: 24  Osteoporosis; verbalizes understanding; Date: 24  Exercise Terminology; verbalizes understanding; Date:  24    Comments:  I reviewed exercise recommendations with Mr. Yan.  I encouraged him to continue exercising at least 2 non-rehab days per week for at least 30 minutes.  He stated understanding.     The exercise prescription will continue to be adjusted based on tolerance of exercise intensity by patient.    Stanislav Howard, CEP    12 Session Follow Up   Cardiac Rehab Individual Treatment Plan - Reassessment    Patient Name: Yuriy Jerome MRN: [id number]   : 1955   Age: 69 y.o.   Primary Diagnosis: PTCA/stent    Nutrition Assessment:     Anthropometrics    Height 73 inches   Weight 247 lbs   BMI 32.6     Patient confirms he is taking Crestor 20mg for cholesterol control.  Difficulty Chewing or Swallowing: No  Current Exercise: See Exercise Physiologist Note  Food Safety/Food Preparation: spouse  Living Arrangements/Family Support: Lives with spouse  Cultural/Spiritual/Personal Preferences: not applicable   Barriers to Education: none identified  Stage of Change Related to Diet Habits: Maintenance  Recent Changes to Diet: No  Food Diary: Given      Nutrition Plan:   Goals:  LDL-C < 70 (for high risk patients)  Hgb A1c < 7%  BMI < 25 and abdominal girth < 40M/<35 F  2 gram sodium, Mediterranean diet: In Progress  Rehab weight loss goal of 10 lbs, 1 lb per week: In Progress  Fish intake (non-fried varieties) to a goal of 2-3 servings per week: Met  Increase fruit and vegetable intake: In Progress    Comments on Goal Progression:  Pt states he feels well, eats fish several  times weekly- no diet changes    Interventions:  Dietitian Consult: No  Patient to participate in Cardiac Rehab sessions three times a week  Weekly Dietitian Weight Check  Nutrition Recommendations Provided: Verbal, Reviewed  Follow Up Plan for Ongoing Self-Management Support    Education:  Cholesterol and Fat; verbalizes understanding; Date: 9/4/24  Protein; verbalizes understanding; Date: 8/28/24  Vitamins; verbalizes understanding; Date: 8/21/24  Food Additives; verbalizes understanding; Date: 8/14/24    Comments:   Discussed ways to incorporate healthy snacks, eating on a schedule, and monitoring sodium intake for heart health.    Diabetes  Is the patient diabetic? Yes   Other Core Components/Diabetes Assessment:   Labs:  Lab Results   Component Value Date    GLUF 140 (H) 08/06/2024     Lab Results   Component Value Date    HGBA1C 6.0 (H) 10/08/2024    HGBA1C 6.4 (H) 08/06/2024    HGBA1C 6.8 (H) 04/01/2024      Lab Results   Component Value Date    ESTIMATEDAVG 126 10/08/2024    ESTIMATEDAVG 137 (H) 08/06/2024    ESTIMATEDAVG 148 (H) 04/01/2024       History of diabetes since 2023  Diabetes medications: metformin 1000mg BID  Blood Glucose Checks at Home: No  Endocrinologist or PCP following DM: Dr. Jerome PCP    Other Core Components/Diabetes Plan:   Goals:  Hgb A1c < 7%  Exercise Blood Glucose: 100-300 mg/dl    Interventions:  Reviewed and Discussed Labs  Medication Compliance  Med Card Reconciled  Low Sodium, Mediterranean, ADA Diet  Weight Management  Physical Activity  Increase Knowledge of Contributory Factors  Home Monitoring    Education:  Risk Factors; verbalizes understanding; Date: 9/6/24    Comments:   Patient verbalizes understanding to bring home glucometer and check glucose pre and post each exercise session.  Per cardiac rehab protocols, patient's glucose must be between 90 and 270 mg/dL to exercise.  Patient denies any recent glucose levels less than 60 mg/dL or greater than 300 mg/dL. Patient  verbalizes importance of notifying rehab staff if symptoms of hypoglycemia occur while at cardiac rehab.  Abnormal labs will be reported to patient's PCP/Endocrinologist by rehab staff.    Pt's glucose remains within acceptable parameters while at rehab  Emphasized importance of home monitoring  Pt does not wish to attend DM education for glucometer training but has been encouraged to do so    Em Herman MS, RDN/LDN    Session: 12 Session Follow Up   Cardiac Rehab Individual Treatment Plan - Reassessment      Patient Name: Yuriy Jerome MRN: [id number]   : 1955   Age: 69 y.o.   Primary Diagnosis: S/P PTCA 2024      Psychosocial Assessment:   Living Arrangements: Lives with spouse  Family Support: children and spouse  Self Reported: no change Effective Coping Skills  Displays: happiness and calmness  Medication: not applicable    Psychosocial Plan:   Goals:  Verbalizes coping mechanisms: Met  Maintain positive support system: Met  Maintain positive outlook: Met  Improve overall quality of life: In Progress    Comments on Goal Progression:  Pt denies any overwhelming stress or anxiety stating that since he is retired he no longer has a problem with this.    Interventions:  Patient to Self Report Emotional Changes at Session Check In  Recommend Physical Activity  Recommend Attending Education Lectures  Notify MD: No  Program Referral: No  Pharmaceutical Intervention/Therapy: No  Other Needs: not applicable  Stage of Readiness to Change: Action    Education:  Risk factors; verbalizes understanding; Date: 2024      Patient has been instructed to notify staff in the event that circumstances worsen.  Patient verbalizes understanding.    Other Core Components/Risk Factors Assessment:   RISK FACTORS:  diabetes, hyperlipidemia, hypertension, obesity, positive family history, sedentary lifestyle    Learning Barriers: None    Medication Compliance: has been compliant with taking medications    Other Core  Components/Risk Factors Plan:   Goals:  Decrease cholesterol level: Met  Increase exercise tolerance: In Progress  Increase knowledge of CAD: In Progress  Decrease blood pressure: Not Met: Pt has blood pressures that are not consistent with <130/80  Weight loss: Met  Control diabetes by adjusting diet and exercise: Met    Comments on Goal Progression:  Pt has been consistent with attending cardiac rehab and with managing his diabetes. Pt states he has lost 4lbs since beginning cardiac rehab in addition to his weight loss prior to attending cardiac rehab. Pt states his endurance for exercise has improved and that he is exercising on days that he is not here.     Interventions:  Individual Education/ Counseling: Yes  Physician Referral: No    Education:    cardiac interventions, verbalizes understanding; Date: 8/23/2024  fluid overload/CHF, verbalizes understanding; Date: 8/30/2024  risk factors, verbalizes understanding; Date: 9/06/2024  warning signs of MI, verbalizes understanding; Date: 8/16/2024         Education method adapted to patients education level and preferred method of learning.  Method: explanation  handouts        Other Core Components/Hypertension Assessment:   BP Range: 146-190 systolic;  diastolic  BP at Goal: No  Patient reported symptoms: none    Medications:  Medication Prescribed? Adherent? Exception   Beta-blocker []Yes  [x]No  []Unknown []Yes  []No  []Unknown    ACEI/ARB [x]Yes  []No  []Unknown [x]Yes  []No  []Unknown    Calcium Channel Blocker [x]Yes  []No  []Unknown [x]Yes  []No  []Unknown    Diuretic [x]Yes  []No  []Unknown [x]Yes  []No  []Unknown        Other Core Components/Hypertension Plan:   Goals:  Blood Pressure <130/80    Comments on Goal Progression:  Pt continues to have elevated blood pressures at rest. Will send message to PCP.    Interventions:  Med Card Reconciled: Yes  Encourage medication compliance  Encourage sodium reduction  Reduce alcohol consumption  Encourage  weight loss  Recommend physical activity  Educate on contributory factors  Reduce stress, anxiety, anger, depression, and/or chronic pain  Encourage home blood pressure monitoring  Recommend daily weights  Enroll in Digital Hypertension Program  MD notified/Physician Referral: Yes    Education:    Congestive Heart Failure; verbalizes understanding; Date: 8/30/2024  Risk Factors; verbalizes understanding; Date: 9/06/2024             Does the patient have Heart Failure? No      Other Core Components/Tobacco Cessation Assessment:   Smoking Status: Lifetime Non-smoker  Primary Tobacco Type: N/A  Tobacco Usage: no  Smoking Cessation Barriers:  N/A  Stage of Readiness to Change: Maintenance    Other Core Components/Tobacco Cessation Plan:   Goals:  Maintain non-smoking status    Comments on Goal Progression:  Pt does not smoke    Interventions:  Maintains non-smoking status    Education:    Risk Factors; verbalizes understanding; Date: 9/06/2024          Comments:   Pt verbalizes no desire to use tobacco products.    Omid MartinezRN

## 2024-10-10 NOTE — PROGRESS NOTES
[facility] Note    Chief Complaint      Chief Complaint   Patient presents with    Diabetes     History of Present Illness      Yuriy Jerome is a 69 y.o. male who presents today for chief complaint follow up dm, cad.     HPI   Last seen 4.24 annual   Labs last week LDL 45, A1c as below, nl rf and neg microalb, HH stable   10# weight loss     Interval hx   Was being eval for elevated PSA and considering prostatectomy vs TURP withDr Greene, now tentatively for 2025  CAD S/P PCI: He had an episode of CP  so he saw Dr Noble for perfusion eval which was abnl so he had angiogram which showd LAD, LCx,  RCA stenosis and had PRAVEENA to LAD with DAPT 12 months per Yousef he could safekly hold anti plt drugs as soon as Dec for prostatectomy. He is in cardiac rehab. He is undergoing a 30d event monitor   In the meantime in 6.24 he has thyroid nodule bx which showed atypic undetermined significance, ref'd to [facility] who sent for genetic testing and High-risk of malignancy on genetic testing. He is having thyroidectomy in Nov     Today he reports upper back pain T spine L sided pain - cystic lesion     Derm Paddock for cystic acne     Chronic hx  LACHO sees PARVIZ Samano in sleep med uses autopap. Sleep poor, sleep is worse if he doesn't use it at all   HTN on norvasc and losaratn hctz is above goal today, has home cuff,  DM on Metformin monotherapy: A1c .2->6.8->7.1->6.8 ->6.0!!  HLD on crestor 20 LD is 65  No hx of asthma, was having wheezing while using rcalled cpap now resolved, allergy shots with Dr Sherrie Cabrera/adan  C and L spine pain seeing Cornelio at [facility] spine, had MRI and doing PT. He has been sleeping poorly, may in part be due to pain, cant fall asleep. 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, He also has sciatica   S/p bialteral THR with Oscar  Had foot surgery had pes planus with dr Pang    PSA: utd   Colonoscopy: q5 years Mark Hoang get records 10.2020  Flu: utd  11.2021  Shingles: not had   PNA: x2 utd   Tobacco: never   Active Problem List with Overview Notes    Diagnosis Date Noted    Coronary atherosclerosis of native coronary artery 08/14/2024    Thyroid nodule 06/20/2024    Stented coronary artery 06/12/2024      Prox/mid LAD with 2 PRAVEENA 4.0 post dilated with 4.5 NC balloon      Morbid (severe) obesity due to excess calories 09/11/2023    Subluxation of tarsal joint of left foot 05/19/2022    Hyperlipidemia 12/15/2021    Essential hypertension 12/15/2021    Benign prostatic hyperplasia 12/15/2021    Obesity (BMI 35.0-39.9 without comorbidity) 12/15/2021    Type 2 diabetes mellitus with other specified complication, without long-term current use of insulin     Insomnia 08/24/2018    Obstructive sleep apnea 05/26/2016       Health Maintenance   Topic Date Due    Foot Exam  Never done    TETANUS VACCINE  Never done    Eye Exam  01/27/2024    PROSTATE-SPECIFIC ANTIGEN  02/27/2025    Hemoglobin A1c  05/05/2025    Lipid Panel  11/05/2025    High Dose Statin  11/15/2025    Colorectal Cancer Screening  12/21/2025    Hepatitis C Screening  Completed    Shingles Vaccine  Completed       Past Medical History:   Diagnosis Date    Allergy     Diabetes mellitus     prediabetic    Sleep apnea     Staphylococcal infection     left ankle, 2017       Past Surgical History:   Procedure Laterality Date    ADENOIDECTOMY  2001    UPPP surgery    APPENDECTOMY  2005    BICEPS TENDON REPAIR Left 12/28/2021    CORONARY ANGIOGRAPHY N/A 6/4/2024    Procedure: ANGIOGRAM, CORONARY ARTERY;  Surgeon: Raji Noble MD;  Location: [facility] CATH LAB/EP;  Service: Cardiology;  Laterality: N/A;    EYE SURGERY  2017    Cararact surgery    FOOT SURGERY Left 05/2017    subtalar implant, 6 weeks later removed due to failure    FOOT SURGERY Left 05/19/2022    HERNIA REPAIR      DOUBLE age 8    IVUS, CORONARY  6/4/2024    Procedure: IVUS, Coronary;  Surgeon: Raji Noble MD;  Location: [facility] CATH LAB/EP;   Service: Cardiology;;    JOINT REPLACEMENT  2009    Bilateral knee replacement    KNEE SURGERY Bilateral 05/2011    TKR, had ligament replacement in right , meniscus repair both knees    LAMINECTOMY  1989    spinal    LEFT HEART CATHETERIZATION Left 6/4/2024    Procedure: Left heart cath;  Surgeon: Raji Noble MD;  Location: [facility] CATH LAB/EP;  Service: Cardiology;  Laterality: Left;    PERCUTANEOUS CORONARY INTERVENTION, ARTERY N/A 6/4/2024    Procedure: Percutaneous coronary intervention;  Surgeon: Raji Noble MD;  Location: [facility] CATH LAB/EP;  Service: Cardiology;  Laterality: N/A;    PTCA, SINGLE VESSEL  6/4/2024    Procedure: PTCA, Single Vessel;  Surgeon: Raji Noble MD;  Location: [facility] CATH LAB/EP;  Service: Cardiology;;    SPINE SURGERY  1991    Triple laminectomy    THYROIDECTOMY Bilateral 11/15/2024    Procedure: THYROIDECTOMY, TOTAL;  Surgeon: Vlad Avelar MD;  Location: [address];  Service: ENT;  Laterality: Bilateral;  Nerve monitoring booked at 7am on 11/15 (CL 10/21).    TONSILLECTOMY  2001    UPPP surgery    UVULOPALATOPHARYNGOPLASTY  2007    VASECTOMY  1985       family history includes Arthritis in his mother; Asthma in his father; Cancer in his maternal grandfather and maternal grandmother; Drug abuse in his brother, brother, brother, and brother; Heart disease in his father.    Social History     Tobacco Use    Smoking status: Never    Smokeless tobacco: Never   Substance Use Topics    Alcohol use: Yes     Alcohol/week: 11.0 standard drinks of alcohol     Types: 7 Glasses of wine, 4 Cans of beer per week    Drug use: No       Review of Systems   Constitutional:  Negative for chills, fever, malaise/fatigue and weight loss.   Respiratory:  Negative for cough, sputum production, shortness of breath and wheezing.    Cardiovascular:  Negative for chest pain, palpitations, orthopnea and leg swelling.   Gastrointestinal:  Negative for constipation, diarrhea, nausea and  vomiting.   Genitourinary:  Negative for dysuria, frequency, hematuria and urgency.        Outpatient Encounter Medications as of 10/10/2024   Medication Sig Note Dispense Refill    alpha lipoic acid 600 mg Cap Take by mouth. 11/5/2024: Hold 1 week prior to surgery          amLODIPine (NORVASC) 10 MG tablet Take 1 tablet (10 mg total) by mouth once daily. 11/5/2024: Take as prescribed     90 tablet 3    aspirin (ECOTRIN) 81 MG EC tablet Take 81 mg by mouth once daily. 11/5/2024: Continue per cardiologist.       blood-glucose meter (ONETOUCH VERIO REFLECT START) kit Use as instructed  1 each 0    clopidogreL (PLAVIX) 75 mg tablet Take 1 tablet (75 mg total) by mouth once daily. 11/5/2024: HOLD 5 DAYS PRIOR TO SURGERY 90 tablet 3    coQ10, ubiquinol, 200 mg Cap  11/5/2024: Hold 1 week prior to surgery          finasteride (PROSCAR) 5 mg tablet Take 1 tablet (5 mg total) by mouth once daily. 11/5/2024: Take as prescribed     90 tablet 3    fluticasone propionate (FLONASE) 50 mcg/actuation nasal spray fluticasone propionate 50 mcg/actuation nasal spray,suspension 11/5/2024: Okay to take if needed.        lancets (ONETOUCH DELICA PLUS LANCET) 30 gauge Misc 1 lancet  by Misc.(Non-Drug; Combo Route) route 2 (two) times a day.  100 each 3    losartan-hydrochlorothiazide 100-12.5 mg (HYZAAR) 100-12.5 mg Tab TAKE 1 TABLET BY MOUTH EVERY DAY 11/5/2024: Hold morning of surgery     90 tablet 2    metFORMIN (GLUCOPHAGE) 1000 MG tablet TAKE 1 TABLET(1000 MG) BY MOUTH TWICE DAILY WITH MEALS 11/5/2024: Hold night before and morning of surgery.   180 tablet 1    mirabegron (MYRBETRIQ) 50 mg Tb24 Take 1 tablet (50 mg total) by mouth once daily. 11/5/2024: Take as prescribed     90 tablet 3    multivit with minerals/lutein (MULTIVITAMIN 50 PLUS ORAL) Take by mouth. 11/5/2024: Hold 1 week prior to surgery          omega-3 fatty acids/fish oil (FISH OIL-OMEGA-3 FATTY ACIDS) 300-1,000 mg capsule Take by mouth once daily. 11/5/2024: Hold 1  "week prior to surgery          ONETOUCH VERIO TEST STRIPS Strp USE 1 TEST STRIP TWICE DAILY  200 strip 3    rosuvastatin (CRESTOR) 20 MG tablet TAKE 1 TABLET(20 MG) BY MOUTH EVERY DAY 11/5/2024: Take as prescribed     90 tablet 3    UNABLE TO FIND osteobiflex 11/5/2024: Hold 1 week prior to surgery          nitroGLYCERIN (NITROSTAT) 0.4 MG SL tablet Place 1 tablet (0.4 mg total) under the tongue every 5 (five) minutes as needed for Chest pain. 11/5/2024: Okay to take if needed.   20 tablet 12    [DISCONTINUED] amLODIPine (NORVASC) 5 MG tablet TAKE 1 TABLET(5 MG) BY MOUTH EVERY DAY  90 tablet 3    [DISCONTINUED] blood sugar diagnostic (ONETOUCH VERIO TEST STRIPS) Strp 1 strip by Misc.(Non-Drug; Combo Route) route 2 (two) times a day.  100 strip 0     Facility-Administered Encounter Medications as of 10/10/2024   Medication Dose Route Frequency Provider Last Rate Last Admin    sodium chloride 0.9% flush 10 mL  10 mL Intravenous PRN Raji Noble MD            Review of patient's allergies indicates:   Allergen Reactions    Hay fever and allergy relief Other (See Comments)           Physical Exam      Vital Signs  Pulse: 66  SpO2: 97 %  BP: 138/74  Height and Weight  Height: 6' 1" (185.4 cm)  Weight: 113 kg (249 lb 1.9 oz)  BSA (Calculated - sq m): 2.41 sq meters  BMI (Calculated): 32.9  Weight in (lb) to have BMI = 25: 189.1]    Physical Exam  Vitals reviewed.   Constitutional:       Appearance: He is well-developed. He is not diaphoretic.   HENT:      Head: Normocephalic and atraumatic.      Right Ear: External ear normal.      Left Ear: External ear normal.   Eyes:      General: No scleral icterus.        Right eye: No discharge.         Left eye: No discharge.      Conjunctiva/sclera: Conjunctivae normal.   Cardiovascular:      Rate and Rhythm: Normal rate and regular rhythm.      Heart sounds: Normal heart sounds.   Pulmonary:      Effort: Pulmonary effort is normal. No respiratory distress. " "  Musculoskeletal:         General: Normal range of motion.      Cervical back: Normal range of motion.   Neurological:      Mental Status: He is alert and oriented to person, place, and time.   Psychiatric:         Behavior: Behavior normal.         Thought Content: Thought content normal.         Judgment: Judgment normal.          Laboratory:  CBC:  Recent Labs   Lab Result Units 10/08/24  0751 11/05/24  0937 11/16/24  0350   WBC K/uL 7.22 5.74 10.86   RBC M/uL 4.70 4.64 4.61   Hemoglobin g/dL 15.0 14.7 14.6   Hematocrit % 44.5 44.5 42.2   Platelets K/uL 238 253 236   MCV fL 95 96 92   MCH pg 31.9* 31.7* 31.7*   MCHC g/dL 33.7 33.0 34.6     CMP:  Recent Labs   Lab Result Units 10/08/24  0751 11/16/24  0349   Glucose mg/dL 109 128*   Calcium mg/dL 10.2 9.6   Albumin g/dL 4.2 4.0   Total Protein g/dL 6.8 6.7   Sodium mmol/L 140 137   Potassium mmol/L 4.8 3.8   CO2 mmol/L 27 22*   Chloride mmol/L 104 99   BUN mg/dL 15 15   Alkaline Phosphatase U/L 55 53   ALT U/L 22 16   AST U/L 17 12   Total Bilirubin mg/dL 0.8 0.7     URINALYSIS:  Recent Labs   Lab Result Units 09/03/24  1418   Color, UA  Yellow   Clarity, UA  Clear   Spec Grav UA  1.030   pH, UA  5.5   Nitrite, UA  negative   Urobilinogen, UA  0.2      LIPIDS:  Recent Labs   Lab Result Units 10/08/24  0751 11/05/24  0937   HDL mg/dL 37* 40   Cholesterol mg/dL 108* 113*   Triglycerides mg/dL 128 111   LDL Cholesterol mg/dL 45.4* 50.8*   HDL/Cholesterol Ratio % 34.3 35.4   Non-HDL Cholesterol mg/dL 71 73   Total Cholesterol/HDL Ratio  2.9 2.8     TSH:  No results for input(s): "TSH" in the last 2160 hours.  A1C:  Recent Labs   Lab Result Units 10/08/24  0751 11/05/24  0937   Hemoglobin A1C % 6.0* 6.0*       Assessment/Plan     Yuriy Jerome is a 69 y.o.male with:    1. Atherosclerosis of native coronary artery of native heart with stable angina pectoris  Assessment & Plan:  Continue goal directed medical tx       2. Type 2 diabetes mellitus with other specified " complication, without long-term current use of insulin  Assessment & Plan:  A1c much better no change       3. Thyroid nodule  Assessment & Plan:  Per ent for thyroidectomy       4. Essential hypertension  Assessment & Plan:  Continue home monitoring       5. Benign prostatic hyperplasia, unspecified whether lower urinary tract symptoms present  Assessment & Plan:  Mgmty per urology at 6 mos post PCI and after thyroidectomy            Use of the New Scre[employer] Patient Portal discussed and encouraged during today's visit  -Continue current medications and maintain follow up with specialists.  Return to clinic in .  Future Appointments   Date Time Provider Department Center   4/3/2025  2:20 PM Raji Noble MD [facility] CARDIO Yumiko Neto Jerome MD  11/17/2024 2:36 PM    Primary Care Internal Medicine

## 2024-10-11 ENCOUNTER — CLINICAL SUPPORT (OUTPATIENT)
Dept: CARDIAC REHAB | Facility: CLINIC | Age: 69
End: 2024-10-11
Payer: MEDICARE

## 2024-10-11 DIAGNOSIS — I25.10 CORONARY ATHEROSCLEROSIS OF NATIVE CORONARY ARTERY: ICD-10-CM

## 2024-10-11 DIAGNOSIS — Z95.5 STENTED CORONARY ARTERY: Primary | ICD-10-CM

## 2024-10-14 ENCOUNTER — CLINICAL SUPPORT (OUTPATIENT)
Dept: CARDIAC REHAB | Facility: CLINIC | Age: 69
End: 2024-10-14
Payer: MEDICARE

## 2024-10-14 DIAGNOSIS — I25.10 ATHEROSCLEROSIS OF NATIVE CORONARY ARTERY OF NATIVE HEART WITHOUT ANGINA PECTORIS: ICD-10-CM

## 2024-10-14 DIAGNOSIS — Z95.5 STENTED CORONARY ARTERY: Primary | ICD-10-CM

## 2024-10-14 PROCEDURE — 93798 PHYS/QHP OP CAR RHAB W/ECG: CPT | Mod: S$GLB,,, | Performed by: STUDENT IN AN ORGANIZED HEALTH CARE EDUCATION/TRAINING PROGRAM

## 2024-10-16 ENCOUNTER — CLINICAL SUPPORT (OUTPATIENT)
Dept: CARDIAC REHAB | Facility: CLINIC | Age: 69
End: 2024-10-16
Payer: MEDICARE

## 2024-10-16 DIAGNOSIS — I25.10 CORONARY ATHEROSCLEROSIS OF NATIVE CORONARY ARTERY: ICD-10-CM

## 2024-10-16 DIAGNOSIS — Z95.5 STENTED CORONARY ARTERY: Primary | ICD-10-CM

## 2024-10-16 PROCEDURE — 93798 PHYS/QHP OP CAR RHAB W/ECG: CPT | Mod: S$GLB,,, | Performed by: INTERNAL MEDICINE

## 2024-10-18 ENCOUNTER — CLINICAL SUPPORT (OUTPATIENT)
Dept: CARDIAC REHAB | Facility: CLINIC | Age: 69
End: 2024-10-18
Payer: MEDICARE

## 2024-10-18 DIAGNOSIS — I25.10 CORONARY ATHEROSCLEROSIS OF NATIVE CORONARY ARTERY: ICD-10-CM

## 2024-10-18 DIAGNOSIS — Z95.5 STENTED CORONARY ARTERY: Primary | ICD-10-CM

## 2024-10-21 ENCOUNTER — CLINICAL SUPPORT (OUTPATIENT)
Dept: CARDIAC REHAB | Facility: CLINIC | Age: 69
End: 2024-10-21
Payer: MEDICARE

## 2024-10-21 DIAGNOSIS — Z95.5 STENTED CORONARY ARTERY: Primary | ICD-10-CM

## 2024-10-21 DIAGNOSIS — I25.10 CORONARY ARTERY DISEASE, UNSPECIFIED VESSEL OR LESION TYPE, UNSPECIFIED WHETHER ANGINA PRESENT, UNSPECIFIED WHETHER NATIVE OR TRANSPLANTED HEART: ICD-10-CM

## 2024-10-21 PROCEDURE — 93798 PHYS/QHP OP CAR RHAB W/ECG: CPT | Mod: S$GLB,,, | Performed by: INTERNAL MEDICINE

## 2024-10-23 ENCOUNTER — CLINICAL SUPPORT (OUTPATIENT)
Dept: CARDIAC REHAB | Facility: CLINIC | Age: 69
End: 2024-10-23
Payer: MEDICARE

## 2024-10-23 DIAGNOSIS — I25.10 CORONARY ATHEROSCLEROSIS OF NATIVE CORONARY ARTERY: ICD-10-CM

## 2024-10-23 DIAGNOSIS — Z95.5 STENTED CORONARY ARTERY: Primary | ICD-10-CM

## 2024-10-23 PROCEDURE — 93798 PHYS/QHP OP CAR RHAB W/ECG: CPT | Mod: S$GLB,,, | Performed by: INTERNAL MEDICINE

## 2024-10-25 ENCOUNTER — CLINICAL SUPPORT (OUTPATIENT)
Dept: CARDIAC REHAB | Facility: CLINIC | Age: 69
End: 2024-10-25
Payer: MEDICARE

## 2024-10-25 DIAGNOSIS — Z95.5 STENTED CORONARY ARTERY: Primary | ICD-10-CM

## 2024-10-25 DIAGNOSIS — I25.10 CORONARY ATHEROSCLEROSIS OF NATIVE CORONARY ARTERY: ICD-10-CM

## 2024-10-28 ENCOUNTER — CLINICAL SUPPORT (OUTPATIENT)
Dept: CARDIAC REHAB | Facility: CLINIC | Age: 69
End: 2024-10-28
Payer: MEDICARE

## 2024-10-28 DIAGNOSIS — Z95.5 STENTED CORONARY ARTERY: Primary | ICD-10-CM

## 2024-10-28 DIAGNOSIS — I25.10 ATHEROSCLEROSIS OF NATIVE CORONARY ARTERY OF NATIVE HEART WITHOUT ANGINA PECTORIS: ICD-10-CM

## 2024-10-28 PROCEDURE — 93798 PHYS/QHP OP CAR RHAB W/ECG: CPT | Mod: S$GLB,,, | Performed by: STUDENT IN AN ORGANIZED HEALTH CARE EDUCATION/TRAINING PROGRAM

## 2024-10-30 ENCOUNTER — CLINICAL SUPPORT (OUTPATIENT)
Dept: CARDIAC REHAB | Facility: CLINIC | Age: 69
End: 2024-10-30
Payer: MEDICARE

## 2024-10-30 DIAGNOSIS — I25.10 CORONARY ATHEROSCLEROSIS OF NATIVE CORONARY ARTERY: ICD-10-CM

## 2024-10-30 DIAGNOSIS — Z95.5 STENTED CORONARY ARTERY: Primary | ICD-10-CM

## 2024-10-30 PROCEDURE — 93798 PHYS/QHP OP CAR RHAB W/ECG: CPT | Mod: S$GLB,,, | Performed by: INTERNAL MEDICINE

## 2024-10-30 NOTE — ANESTHESIA PAT ROS NOTE
10/30/2024  Yuriy Jerome is a 69 y.o., male.      Pre-op Assessment          Review of Systems  Anesthesia Hx:   History of prior surgery of interest to airway management or planning:  Previous anesthesia: General Left talonavicular arthrodesis, Left subtalar arthrodesis, Left posterior tibial tendon repair, Left transfer of the posterior tibial tendon to the talus, Transfer of the left flexor digitorum longus tendon to the navicular, Intraoperative fluoroscopy  (Left: Ankle) 5/19/22 with general anesthesia.  Procedure performed at an [facility].      Airway issues documented on chart review include laryngeal mask airway used     Denies Family Hx of Anesthesia complications.   Personal Hx of Anesthesia complications               Unintended Unpleasent Intraoperative Awareness      Social:  Non-Smoker, Alcohol Use       Hematology/Oncology:  Hematology Normal   Oncology Normal                                   EENT/Dental:   THYROID NODULE          Cardiovascular:     Hypertension   CAD       Denies Angina.     hyperlipidemia       Functional Capacity good / => 4 METS   Coronary Artery Disease:      S/P Percutaneous Coronary Intervention (PCI)   S/P Drug Eluting Stent (PRAVEENA), drug eluding stent placed 2024.                           Pulmonary:       Denies Shortness of breath.  Denies Recent URI. Sleep Apnea                Renal/:  Renal/ Normal                 Hepatic/GI:  Hepatic/GI Normal                    Musculoskeletal:  Musculoskeletal Normal                Neurological:  Neurology Normal                                      Endocrine:  Diabetes         Obesity / BMI > 30  Psych:  Psychiatric Normal                         Anesthesia Assessment: Preoperative EQUATION    Planned Procedure: Procedure(s) (LRB):  THYROIDECTOMY, TOTAL (Bilateral)  Requested Anesthesia Type:General  Surgeon:  Vlad Avelar MD  Service: ENT  Known or anticipated Date of Surgery:11/15/2024    Surgeon notes: reviewed    Electronic QUestionnaire Assessment completed via nurse interview with patient.        Triage considerations:     The patient has no apparent active cardiac condition (No unstable coronary Syndrome such as severe unstable angina or recent [<1 month] myocardial infarction, decompensated CHF, severe valvular   disease or significant arrhythmia)    Previous anesthesia records:GETA, LMA General, and No problems    Airway:  Mallampati: I   Mouth Opening: Normal  TM Distance: Normal  Neck ROM: Normal ROM    Last PCP note: within 3 months , within [facility]   Subspecialty notes: Cardiology: General, ENT    Other important co-morbidities: DM2, HLD, HTN, Obesity, and LACHO      Tests already available:  Available tests,  within 3 months , within [facility] .     10/8/24  URINE, CBC, A1C, LIPID PANEL, CMP    8/6/24  EKG    6/20/24  STRESS ECHO            Instructions given. (See in Nurse's note)    Optimization:  Anesthesia Preop Clinic Assessment NOT Indicated           Sub-specialist consult indicated:   [facility] CARDIOLOGY WITH MEDICATION INSTRUCTION       Plan:    Testing:  None Needed        Consultation: PATIENT'S CARDIOLOGIST     Patient  has previously scheduled Medical Appointment: 10/30, 11/1, 11/4, 11/5, 11/6, 11/8, 11/11, 11/13    Navigation:              Consults scheduled.    Plan to have thyroidectomy due to concerns about atypical cells.  Okay to hold Plavix 3-5 days before the procedure and restart post.  However he can not stop aspirin preprocedure.  Needs to continue aspirin perioperatively.   Created proceed with planned surgery from cardiac standpoint  Raji Noble MD  Cardiology     SURGEON NOTIFIED OF RECOMMENDATION TO REMAIN ON ASA. OKAY TO PROCEED.

## 2024-10-31 ENCOUNTER — DOCUMENTATION ONLY (OUTPATIENT)
Dept: CARDIAC REHAB | Facility: CLINIC | Age: 69
End: 2024-10-31
Payer: MEDICARE

## 2024-11-01 ENCOUNTER — CLINICAL SUPPORT (OUTPATIENT)
Dept: CARDIAC REHAB | Facility: CLINIC | Age: 69
End: 2024-11-01
Payer: MEDICARE

## 2024-11-01 DIAGNOSIS — I25.10 CORONARY ATHEROSCLEROSIS OF NATIVE CORONARY ARTERY: ICD-10-CM

## 2024-11-01 DIAGNOSIS — Z95.5 STENTED CORONARY ARTERY: Primary | ICD-10-CM

## 2024-11-04 ENCOUNTER — CLINICAL SUPPORT (OUTPATIENT)
Dept: CARDIAC REHAB | Facility: CLINIC | Age: 69
End: 2024-11-04
Payer: MEDICARE

## 2024-11-04 DIAGNOSIS — Z95.5 STENTED CORONARY ARTERY: Primary | ICD-10-CM

## 2024-11-04 DIAGNOSIS — I25.10 ATHEROSCLEROSIS OF NATIVE CORONARY ARTERY OF NATIVE HEART, UNSPECIFIED WHETHER ANGINA PRESENT: ICD-10-CM

## 2024-11-04 PROCEDURE — 93798 PHYS/QHP OP CAR RHAB W/ECG: CPT | Mod: S$GLB,,, | Performed by: INTERNAL MEDICINE

## 2024-11-04 NOTE — PROGRESS NOTES
[facility] - PRIMARY CARE SKIN    CC : Hair loss   SUBJECTIVE:                                                    Melissa Painter is a 43 year old female who presents to clinic today for follow-up of hair loss on the scalp, first beginning 4 years ago, recurring without any noticeable pattern every 4-6 months.    Last treatment : 2/27/18  Treatment type : triamcinolone 10 mg injected  Treatment number : 1  Other treatments : clobetasol propionate 0.05% solution applied nightly  Side effects : Itchiness and tenderness from bumps on scalp have continued even with use of topical steroid.    Tissue Pathology Report from 2/20/2018       Personal Medical History  Skin Cancer : NO  Eczema Psoriasis Rosacea Autoimmune   NO NO NO NO     Family Medical History  Connective tissue disease : NO  Thyroid disease : NO  Hair Loss : NO  Skin Cancer : NO  Eczema Psoriasis Rosacea Autoimmune   ?YES  NO NO NO   No family history of sickle cell disease.    Occupation : CNA in a hospital (indoor).    Patient Active Problem List   Diagnosis     Iron deficiency anemia, unspecified iron deficiency     Chronic fatigue     Vitamin D deficiency     Hair loss     Immunity status testing     Excessive or frequent menstruation     CARDIOVASCULAR SCREENING; LDL GOAL LESS THAN 160     Seasonal allergic rhinitis     Rectocele     Cystocele, midline     Urgency-frequency syndrome     Cervicalgia     Acute bilateral low back pain without sciatica     Hepatitis B carrier (H)     Acute right-sided low back pain without sciatica       Past Medical History:   Diagnosis Date     Anemia      Chronic fatigue 4/7/2016     Urgency-frequency syndrome     Past Surgical History:   Procedure Laterality Date     AS HYSTEROS W PERMANENT FALLOPAIN IMPLANT  2013    ESSURE     wisdom teeth        Social History   Substance Use Topics     Smoking status: Never Smoker     Smokeless tobacco: Never Used     Alcohol use 0.0 oz/week     0 Standard drinks or equivalent  Patient arrived for cardiac rehab session. Pt reports to have eaten prior to exercise session.  The patient was on continuous EKG monitoring throughout the session. The patient tolerated exercise session well with no complaints.     per week      Comment: 2-3 glasses wine/yr    Family History     Problem (# of Occurrences) Relation (Name,Age of Onset)    Hypertension (1) Mother           Prescription Medications as of 3/27/2018             methylPREDNISolone (MEDROL DOSEPAK) 4 MG tablet Follow package instructions    methocarbamol (ROBAXIN) 750 MG tablet Take 1 tablet (750 mg) by mouth 3 times daily as needed for muscle spasms    clobetasol (TEMOVATE) 0.05 % external solution Apply topically At Bedtime and rub into affected areas of scalp. Never to be used on face nor groin.    diclofenac (VOLTAREN) 75 MG EC tablet Take 1 tablet (75 mg) by mouth 2 times daily as needed for moderate pain    CALCIUM 1000 + D 1000-800 MG-UNIT TABS TAKE ONE TABLET BY MOUTH EVERY DAY WITH FOOD FOR BONE HEALTH AND VITAMIN D SUPPLEMENTATION    Prenatal Vit-Fe Fumarate-FA (PRENATAL LOW IRON) 27-1 MG TABS Take 1 tablet by mouth daily INDICATION: VITAMIN SUPPLEMENTATION    ferrous fumarate 65 mg, [place]. FE,-Vitamin C 125 mg (VITRON C)  MG TABS tablet Take 1 tablet by mouth every morning (before breakfast) INDICATION: IRON SUPPLEMENT    cholecalciferol 5000 UNITS CAPS Take 1 capsule (5,000 Units) by mouth daily INDICATION: LOW VITAMIN D, TAKE WITH FOOD    cetirizine (ZYRTEC) 10 MG tablet TAKE ONE TABLET BY MOUTH EVERY DAY TO TREAT NASAL ALLERGIES    IBUPROFEN PO     fluticasone (FLONASE) 50 MCG/ACT nasal spray Spray 2 sprays in nostril At Bedtime INDICATION: TO CONTROL NASAL ALLERGY SYMPTOMS            Allergies   Allergen Reactions     Levaquin [Levofloxacin] Itching     Influenza Vaccine Live         INTEGUMENTARY/SKIN: POSITIVE for hair loss, pruritus  ROS : 14 point review of systems was negative except the symptoms listed above in the HPI.    This document serves as a record of the services and decisions personally performed and made by Norma Jacobson MD. It was created on her behalf by Greg Marie, a trained medical scribe.  The creation of this document is based  on the scribe's personal observations and the provider's statements to the medical scribe.  Greg Marie, March 27, 2018 9:38 AM      OBJECTIVE:                                                    GENERAL: healthy, alert and no distress  SKIN: Siddiqi Skin Type - VI.  Scalp and Face were examined. The dermatoscope was used to help evaluate pigmented lesions.  Skin Pertinent Findings:  Patchy hair loss on crown of scalp.   Patch of some induration on left lateral frontal scalp, right lateral scalp and right parietal scalp  No skin atrophy at sites of previous injection.  Name : Triamcinolone acetonide (Kenalog) injection.  Indication : lichen planopilaris  Location(s) : scalp.  Completed by : Norma Jacobson MD  Note : Prior to the procedure, we discussed possible hypo- and hyperpigmentation or depression of the skin post-procedure. Explained that more then one injection may be required, that these injections are distributed 4-6 weeks apart, and that up to a total of six injections may be needed.    Skin was cleansed with alcohol. 2.0 mL triamcinolone acetonide 10 mg/mL drawn up.   Injected in 0.1 mL aliquots in the area of alopecia.  Total injected :   1.8 mL = 18 mg into Right parietal scalp, Crown of scalp, and Left lateral frontal scalp  Lot # : [id number]. Expiration Date : Apr 2019  Lot # : [id number]. Expiration Date : Apr 2019    Blanching was present during the injection. Minimal bleeding occurred. Patient left in satisfactory condition.  Treatment number : 2.    MDM : discussed treatment options for lichen planopilaris, expectations of treatment to stop current inflammatory process so less scarring alopecia.      ASSESSMENT:                                                      Encounter Diagnosis   Name Primary?     Lichen planopilaris Yes         PLAN:                                                    Patient Instructions   FUTURE APPOINTMENTS  Follow up in 4-6 weeks.    Discontinue using clobetasol propionate  0.05% solution at this time, but not discard it at this time.        PROCEDURES:                                                    None.    TT: 20 minutes.  CT: 15 minutes.      The information in this document, created by the medical scribe for me, accurately reflects the services I personally performed and the decisions made by me. I have reviewed and approved this document for accuracy prior to leaving the patient care area.  Norma Jacobson MD March 27, 2018 9:32 AM  [facility] [place]

## 2024-11-05 ENCOUNTER — HOSPITAL ENCOUNTER (OUTPATIENT)
Dept: CARDIOLOGY | Facility: HOSPITAL | Age: 69
Discharge: HOME | End: 2024-11-05
Attending: INTERNAL MEDICINE
Payer: MEDICARE

## 2024-11-05 VITALS
DIASTOLIC BLOOD PRESSURE: 85 MMHG | HEART RATE: 59 BPM | WEIGHT: 240 LBS | BODY MASS INDEX: 31.81 KG/M2 | SYSTOLIC BLOOD PRESSURE: 132 MMHG | HEIGHT: 73 IN

## 2024-11-05 DIAGNOSIS — Z95.5 STENTED CORONARY ARTERY: ICD-10-CM

## 2024-11-05 DIAGNOSIS — I25.10 ATHEROSCLEROSIS OF NATIVE CORONARY ARTERY OF NATIVE HEART, UNSPECIFIED WHETHER ANGINA PRESENT: ICD-10-CM

## 2024-11-05 LAB
CV STRESS BASE HR: 59 BPM
DIASTOLIC BLOOD PRESSURE: 85 MMHG
OHS CV CPX 1 MINUTE RECOVERY HEART RATE: 123 BPM
OHS CV CPX 85 PERCENT MAX PREDICTED HEART RATE MALE: 128
OHS CV CPX ABDOMINAL GIRTH: 47.5 CM
OHS CV CPX ANAEROBIC THRESHOLD DIASTOLIC BLOOD PRESSURE: 90 MMHG
OHS CV CPX ANAEROBIC THRESHOLD HEART RATE: 112
OHS CV CPX ANAEROBIC THRESHOLD RATE PRESSURE PRODUCT: NORMAL
OHS CV CPX ANAEROBIC THRESHOLD SYSTOLIC BLOOD PRESSURE: 164
OHS CV CPX DATA GRADE - AT: 10.4
OHS CV CPX DATA GRADE - PEAK: 16.5
OHS CV CPX DATA O2 SAT - PEAK: 98
OHS CV CPX DATA O2 SAT - REST: 99
OHS CV CPX DATA SPEED - AT: 3
OHS CV CPX DATA SPEED - PEAK: 4.2
OHS CV CPX DATA TIME - AT: 5.05
OHS CV CPX DATA TIME - PEAK: 7.82
OHS CV CPX DATA VE/VCO2 - AT: 35
OHS CV CPX DATA VE/VCO2 - PEAK: 36
OHS CV CPX DATA VE/VO2 - AT: 28
OHS CV CPX DATA VE/VO2 - PEAK: 40
OHS CV CPX DATA VO2 - AT: 19.1
OHS CV CPX DATA VO2 - PEAK: 24.9
OHS CV CPX DATA VO2 - REST: 3.9
OHS CV CPX ESTIMATED METS: 13
OHS CV CPX FEV1/FVC: 0.77
OHS CV CPX FORCED EXPIRATORY VOLUME: 2.74
OHS CV CPX FORCED VITAL CAPACITY (FVC): 3.57
OHS CV CPX HIGHEST VO: 33.1
OHS CV CPX MAX PREDICTED HEART RATE: 151
OHS CV CPX MAXIMAL VOLUNTARY VENTILATION (MVV) PREDICTED: 109.6
OHS CV CPX MAXIMAL VOLUNTARY VENTILATION (MVV): 127
OHS CV CPX MAXIUMUM EXERCISE VENTILATION (VE MAX): 99.3
OHS CV CPX PATIENT AGE: 69
OHS CV CPX PATIENT HEIGHT IN: 73
OHS CV CPX PATIENT IS FEMALE AGE 11-19: 0
OHS CV CPX PATIENT IS FEMALE AGE GREATER THAN 19: 0
OHS CV CPX PATIENT IS FEMALE AGE LESS THAN 11: 0
OHS CV CPX PATIENT IS FEMALE: 0
OHS CV CPX PATIENT IS MALE AGE 11-25: 0
OHS CV CPX PATIENT IS MALE AGE GREATER THAN 25: 1
OHS CV CPX PATIENT IS MALE AGE LESS THAN 11: 0
OHS CV CPX PATIENT IS MALE GREATER THAN 18: 1
OHS CV CPX PATIENT IS MALE LESS THAN OR EQUAL TO 18: 0
OHS CV CPX PATIENT IS MALE: 1
OHS CV CPX PATIENT WEIGHT RETURNED IN OZ: 3840
OHS CV CPX PEAK DIASTOLIC BLOOD PRESSURE: 89 MMHG
OHS CV CPX PEAK HEAR RATE: 142 BPM
OHS CV CPX PEAK RATE PRESSURE PRODUCT: NORMAL
OHS CV CPX PEAK SYSTOLIC BLOOD PRESSURE: 139 MMHG
OHS CV CPX PERCENT BODY FAT: 18.3
OHS CV CPX PERCENT MAX PREDICTED HEART RATE ACHIEVED: 94
OHS CV CPX PREDICTED VO2: 33.1 ML/KG/MIN
OHS CV CPX RATE PRESSURE PRODUCT PRESENTING: 7788
OHS CV CPX REST PET CO2: 23
OHS CV CPX VE/VCO2 SLOPE: 29.6
STRESS ECHO POST EXERCISE DUR MIN: 7 MINUTES
STRESS ECHO POST EXERCISE DUR SEC: 49 SECONDS
SYSTOLIC BLOOD PRESSURE: 132 MMHG

## 2024-11-05 PROCEDURE — 94621 CARDIOPULM EXERCISE TESTING: CPT

## 2024-11-05 PROCEDURE — 94621 CARDIOPULM EXERCISE TESTING: CPT | Mod: 26,,, | Performed by: INTERNAL MEDICINE

## 2024-11-06 ENCOUNTER — CLINICAL SUPPORT (OUTPATIENT)
Dept: CARDIAC REHAB | Facility: CLINIC | Age: 69
End: 2024-11-06
Payer: MEDICARE

## 2024-11-06 DIAGNOSIS — I25.10 ATHEROSCLEROSIS OF NATIVE CORONARY ARTERY OF NATIVE HEART, UNSPECIFIED WHETHER ANGINA PRESENT: ICD-10-CM

## 2024-11-06 DIAGNOSIS — Z95.5 STENTED CORONARY ARTERY: Primary | ICD-10-CM

## 2024-11-06 PROCEDURE — 93798 PHYS/QHP OP CAR RHAB W/ECG: CPT | Mod: S$GLB,,, | Performed by: INTERNAL MEDICINE

## 2024-11-07 ENCOUNTER — TELEPHONE (OUTPATIENT)
Dept: CARDIOLOGY | Facility: CLINIC | Age: 69
End: 2024-11-07
Payer: MEDICARE

## 2024-11-07 ENCOUNTER — PATIENT MESSAGE (OUTPATIENT)
Dept: CARDIOLOGY | Facility: CLINIC | Age: 69
End: 2024-11-07
Payer: MEDICARE

## 2024-11-07 NOTE — TELEPHONE ENCOUNTER
Spoke to  today and    Provided patient with results of event monitor.  Per Dr. Noble,   - all results are Good..  Patient verbalized understand, no further questions or concerns.  I also forward negra  ,   message below regarding test results.  Patient appreciates the call.      Raji Hopkins MD P Yousef George Staff    Overall your event monitor results is good.  No significant arrhythmias .

## 2024-11-07 NOTE — PROGRESS NOTES
HISTORY: S/P PTCA/STENT (6-4-24), CAD, HTN, HLP, DM, EF=70%(5-17-24)    ANTHROPOMETRICS:     PRE POST   Abdominal girth (in) 48.5 47.5   Height (in) 73 73   Weight (lbs) 249 240   BMI 33.0 31.8   % Body Fat 24.3% 18.3%     EXERCISE RESULTS:     PRE POST   Peak VO2 (CPX only) 23.1 24.9   Actual METS (CPX only) 6.60 7.1   Estimated METS 12.0 13.0       HOME PRESCRIPTION: Yuriy Jeong.  You completed Cardiac Rehab.  Aerobic exercise frequency is recommended 5 to 6 times per week with a duration of 30 to 60 minutes.  Intensity should keep you in your target heart range of 107 to 130 beats per minute.  Include a 3 to 5 minute warm up as well as cool down with stretches.  Resistance training is recommended 2 to 3 days per week on non-consecutive days.  Good luck to you.  Keep up the hard work, and it has been a pleasure working with you.      LAB RESULTS:    Lab Results   Component Value Date    HGB 14.7 11/05/2024    HGB 15.0 10/08/2024    HGB 14.9 08/06/2024     Lab Results   Component Value Date    HCT 44.5 11/05/2024    HCT 44.5 10/08/2024    HCT 45.2 08/06/2024     Lab Results   Component Value Date    MPV 9.9 11/05/2024    MPV 10.5 10/08/2024    MPV 9.8 08/06/2024       Lab Results   Component Value Date    CHOL 113 (L) 11/05/2024    CHOL 108 (L) 10/08/2024    CHOL 140 08/06/2024     Lab Results   Component Value Date    HDL 40 11/05/2024    HDL 37 (L) 10/08/2024    HDL 38 (L) 08/06/2024     Lab Results   Component Value Date    LDLCALC 50.8 (L) 11/05/2024    LDLCALC 45.4 (L) 10/08/2024    LDLCALC 59.2 (L) 08/06/2024     Lab Results   Component Value Date    TRIG 111 11/05/2024    TRIG 128 10/08/2024    TRIG 214 (H) 08/06/2024     Lab Results   Component Value Date    CHOLHDL 35.4 11/05/2024    CHOLHDL 34.3 10/08/2024    CHOLHDL 27.1 08/06/2024       Lab Results   Component Value Date    GLUF 116 (H) 11/05/2024    GLUF 140 (H) 08/06/2024     Lab Results   Component Value Date    HGBA1C 6.0 (H) 11/05/2024     HGBA1C 6.0 (H) 10/08/2024    HGBA1C 6.4 (H) 08/06/2024        Lab Results   Component Value Date    HSCRP 2.32 11/05/2024    HSCRP 2.12 08/06/2024         CHON SCORES:        8/8/2024 11/6/2024   CHON SYMPTOM QUESTIONNAIRE   SUBSCALE SCORE TOTAL     Well-being subscale: Friendliness 0 0   Well-being subscale: Physical Well Being 3 3   Well-being subscale: Contentment 0 1   Well-being subscale: Relaxation 0 0   Symptom subscale: Hostility 4 0   Symptom subscale: Somatic Symptoms 5 5   Symptom subscale: Depression 1 0   Symptom subscale: Anxiety 2 1   --     OVERALL SCORE TOTAL     Total Score: Anxiety 2 1   Total Score: Depression 1 1   Total Score: Somatization 8 8   Total Score: Hostility 4 0   ---     QUESTIONNAIRE ANSWERS     Nervous no no   Weary no no   Irritable yes no   Cheerful yes yes   Tense, tensed up no no   Sad, blue yes no   Happy yes no   Frightened no no   Feeling clam yes yes   Feeling healthy yes yes   Loosing temper easily yes no   Feeling of not enough air no no   Feeling kind towards people yes yes   Feeling fit yes yes   Heavy arms or legs no no   Feeling confident yes yes   Feeling warm toward people yes yes   Shaky no no   No pain anywhere no no   Angry no no   Arms and legs feel strong yes yes   Appetite poor no no   Feeling peaceful yes yes   Feeling unworthy no no   Annoyed yes no   Feeling of rage no no   Cannot enjoy yourself no no   Tight head or neck yes no   Relaxed yes yes   Restless no no   Feeling friendly yes yes   Feeling of hate no no   Choking feeling no no   Afraid no no   Patient yes yes   Scared no no   Furious no no   Feeling charitable, forgiving yes yes   Feeling guilty no no   Feeling well yes yes   Feeling of pressure in head or body no yes   Worried yes no   Contented yes yes   Weak arms or legs no no   Feeling desperate, terrible no no   No aches anywhere no no   Thinking of death or dying no no   Hot tempered no no   Terrified no no   Feeling of courage yes yes    Enjoying yourself yes yes   Breathing difficult no no   Parts of the body feel numb or tingling yes yes   Takes a long time to fall asleep yes yes   Feeling hostile no no   Infuriated no no   Heart beating fast or pounding no no   Depressed no no   Jumpy no no   Feeling a failure no no   Not interested in things no no   Highly strung no no   Cannot relax no no   Panicky no no   Pressure on head no yes   Blaming yourself no no   Thoughts of ending your life no no   Frightening thoughts no no   Enraged no no   Irritated by other people yes no   Looking forward toward the future yes yes   Nauseated, sick to stomach no no   Feeling that life is bad no no   Upset bowels or stomach no no   Feeling inferior to others no no   Feeling useless no no   Muscle pains yes no   No unpleasant feelings in head or body no no   Headaches yes yes   Feel like attacking people no no   Shaking with anger no no   Mad no no   Feeling goodwill yes yes   Feel like crying no no   Cramps no no   Feeling that something bad will happen no no   Wound up, uptight no no   Get angry quickly no no   Self-confident yes yes   Resentful no no   Feeling of hopelessness no no   Head pains yes yes              SF-36 SCORES:        8/8/2024 11/6/2024   SF 36 HEALTH QUESTIONNAIRE   OVERALL TOTAL SCORE     Physical Functioning 90 90   Role limitations due to physical health 100 100   Role limitations due to emotional problems 100 100   Energy/fatigue 60 65   Emotional well-being 76 88   Social functioning 100 100   Pain 70 70   General health 55 65   --     QUESTIONNAIRE ANSWERS     In general, how would you say your health is? good good   Compared to one year ago, how would you rate your health in general now? about the same much better now than one year ago   VIGOROUS ACTIVITIES (running, lifting heavy objects, strenuous sport) yes, limited a little yes, limited a little   MODERATE ACTIVITES (moving a table, vacuuming, bowling or golf) no, not limited at  all no, not limited at all   Lifting or carrying groceries No not limited No not limited   Climbing SEVERAL flights of stairs No not limited No not limited   Climbing ONE flight of stairs no, not limited at all no, not limited at all   Bending or kneeling yes, limited a little yes, limited a little   Walking more than a mile no, not limited at all no, not limited at all   Walking 100 yards (150-200 paces) no, not limited at all no, not limited at all   Walking 100 yards (150-200 paces) No not limited No not limited   Bathing and dressing yourself no, not limited at all no, not limited at all   Cut down on the amount of time you spent on work or other activities no no   Accomplished less than you would have liked no no   Were limited in the kind of work or other activities no no   Had difficulty performing the work or other activities (i.e. took extra effort) no no   Cut down on the amount of time you spent on work or other activities no no   Accomplished less than you would like no no   Did not do work or other activities as carefully as usual no no   During the past four weeks, to what extent has your physical health or emotional problems interfered with your normal social activities with family, friends, neighbors or groups? not at all not at all   How much bodily pain have you had in the last four weeks? moderate moderate   During the past four weeks, how much did pain interfere with your normal work (including work both outside the home and housework)? not at all not at all   Did you feel full of pep? some of the time good bit of the time   Have you been a very nervous person? a little bit of the time none of the time   Have you felt so down in the dumps that nothing could cheer you up? none of the time none of the time   Have you felt calm and peaceful? good bit of the time most of the time   Did you have a lot of energy? good bit of the time good bit of the time   Have you felt downhearted and blue? a little  bit of the time a little bit of the time   Did you feel worn out? a little bit of the time a little bit of the time   Have you been a happy person? good bit of the time most of the time   Did you feel tired? some of the time some of the time   Has your health limited your social activities such as visiting relatives or friends? none of the time none of the time   I seem to get ill more easily than other people. not sure not sure   I am as healthy as anybody I know. mostly true mostly true   I expect my health to get worse. not sure mostly false   My health is excellent. not sure mostly true               PHQ-9:        4/12/2024     8:25 AM 8/8/2024     8:14 AM 11/6/2024     5:20 PM   PHQ-9 Depression Patient Health Questionnaire   Over the last two weeks how often have you been bothered by little interest or pleasure in doing things 0 0 0   Over the last two weeks how often have you been bothered by feeling down, depressed or hopeless 0 0 0   Over the last two weeks how often have you been bothered by trouble falling or staying asleep, or sleeping too much  1 1   Over the last two weeks how often have you been bothered by feeling tired or having little energy  1 0   Over the last two weeks how often have you been bothered by a poor appetite or overeating  1 0   Over the last two weeks how often have you been bothered by feeling bad about yourself - or that you are a failure or have let yourself or your family down  0 0   Over the last two weeks how often have you been bothered by trouble concentrating on things, such as reading the newspaper or watching television  1 1   Over the last two weeks how often have you been bothered by moving or speaking so slowly that other people could have noticed.  0 0   Over the last two weeks how often have you been bothered by thoughts that you would be better off dead, or of hurting yourself  0 0   If you checked off any problems, how difficult have these problems made it for you  to do your work, take care of things at home or get along with other people?  Somewhat difficult Not difficult at all   PHQ-9 Score  4 2                  EDUCATION SCORES:     PRE POST   Education Score 90 90

## 2024-11-07 NOTE — PROGRESS NOTES
Session: Exit     Cardiac Rehab Individual Treatment Plan - Discharge Assessment      Patient Name: Yuriy Jerome MRN: [id number]   : 1955   Age: 69 y.o.   Primary Diagnosis: PTCA/STENT  Date of Event: 24  EF: 70%  Risk Stratification: low  Referring Physician: TOSHIA   Exercise Assessment:     CPX/TM: Entry Results Exit Results    Date: 24 Date: 24   RHR 64 59   Max  142   Peak VO2 (CPX only) 23.1 24.9   Actual METS (CPX only) 6.60 7.1   Estimated METS 12.0 13.0     Anthropometrics Entry Results Exit Results   Height 73 inches 73 inches   Weight 249 lbs 240 lbs   BMI 33.0  31.8   Abdominal Girth 48.5 47.5   Body Composition 24.3% 18.3%     Angina with exercise?: No   ST Depression with Exercise?: No  Currently exercising at home? yes Walking; Frequency: currently 2 days per week but will be increasing since completing the program; Duration just under 60 minutes    Education:  Flexibility/Stretching; verbalizes understanding; Date: 10-14-24  After Phase II; verbalizes understanding; Date: 10-21-24  Body Composition; verbalizes understanding; Date: 10-28-24  Hydration; verbalizes understanding; Date: 24    Rehab Exercise Goals:  Attend Cardiac Rehab 3 times/week: Met  Home Aerobic Exercise: 2 additional days/week for 30-60 minutes: Met  Intensity of 12-15 on the Rate of Perceived Exertion (RPE) scale: Met  30% increase in entry estimated METS: 15.6 : Not Met: 13.0  Demonstrate proper pulse taking technique: Met    Comments: Mr. Yan attended rehab classes regularly and had significant improvement in aerobic fitness.      Exercise Discharge Plan:     Intervention/Recommendations:       Recommended Home Prescription:  THR Range: 107-130   Mode: Aerobic   Frequency: 5-6 times per week   Duration: 30-60 minutes each day   Other Recs: 3-5 minute warm up and cool down/stretches   Resistance Trainin-3 days per week on non-consecutive days       Comments:    I met with Mr. Yan for an exit  interview from the Phase II cardiac rehab program.  The entry and exit stress testing results were discussed as well as current and future exercise programs.     Patient's plan: Mr. Yan is planning to join the [facility] program which meets 3 days per week.  He said he is going to continue walking the other 2 days per week.     I reviewed exercise recommendations with Mr. Yan and encouraged him to continue exercising.  We had a detailed discussion about walking as an exercise modality.  Since he has some orthopedic limitations, other exercise modalities may be a better option to ensure optimal intensity.  I asked him to call if he has any questions in the future.  He stated understanding.      Desean Howard., CEP

## 2024-11-11 ENCOUNTER — CLINICAL SUPPORT (OUTPATIENT)
Dept: CARDIAC REHAB | Facility: CLINIC | Age: 69
End: 2024-11-11
Payer: MEDICARE

## 2024-11-11 DIAGNOSIS — I25.10 CORONARY ARTERY DISEASE, UNSPECIFIED VESSEL OR LESION TYPE, UNSPECIFIED WHETHER ANGINA PRESENT, UNSPECIFIED WHETHER NATIVE OR TRANSPLANTED HEART: ICD-10-CM

## 2024-11-11 DIAGNOSIS — Z95.5 STENTED CORONARY ARTERY: Primary | ICD-10-CM

## 2024-11-11 PROCEDURE — 99999 PR PBB SHADOW E&M-EST. PATIENT-LVL III: CPT | Mod: PBBFAC,,,

## 2024-11-11 PROCEDURE — 93798 PHYS/QHP OP CAR RHAB W/ECG: CPT | Mod: S$GLB,,, | Performed by: INTERNAL MEDICINE

## 2024-11-11 NOTE — PROGRESS NOTES
Session: Exit   Cardiac Rehab Individual Treatment Plan - Discharge Assessment      Patient Name: Yuriy Jerome MRN: [id number]   : 1955   Age: 69 y.o.   Diagnosis: PTCA    Psychosocial Assessment:   Outcome Survey Tools:    CHON SCORES:        2024   CHON SYMPTOM QUESTIONNAIRE   SUBSCALE SCORE TOTAL     Well-being subscale: Friendliness 0 0   Well-being subscale: Physical Well Being 3 3   Well-being subscale: Contentment 0 1   Well-being subscale: Relaxation 0 0   Symptom subscale: Hostility 4 0   Symptom subscale: Somatic Symptoms 5 5   Symptom subscale: Depression 1 0   Symptom subscale: Anxiety 2 1   --     OVERALL SCORE TOTAL     Total Score: Anxiety 2 1   Total Score: Depression 1 1   Total Score: Somatization 8 8   Total Score: Hostility 4 0   ---     QUESTIONNAIRE ANSWERS     Nervous no no   Weary no no   Irritable yes no   Cheerful yes yes   Tense, tensed up no no   Sad, blue yes no   Happy yes no   Frightened no no   Feeling clam yes yes   Feeling healthy yes yes   Loosing temper easily yes no   Feeling of not enough air no no   Feeling kind towards people yes yes   Feeling fit yes yes   Heavy arms or legs no no   Feeling confident yes yes   Feeling warm toward people yes yes   Shaky no no   No pain anywhere no no   Angry no no   Arms and legs feel strong yes yes   Appetite poor no no   Feeling peaceful yes yes   Feeling unworthy no no   Annoyed yes no   Feeling of rage no no   Cannot enjoy yourself no no   Tight head or neck yes no   Relaxed yes yes   Restless no no   Feeling friendly yes yes   Feeling of hate no no   Choking feeling no no   Afraid no no   Patient yes yes   Scared no no   Furious no no   Feeling charitable, forgiving yes yes   Feeling guilty no no   Feeling well yes yes   Feeling of pressure in head or body no yes   Worried yes no   Contented yes yes   Weak arms or legs no no   Feeling desperate, terrible no no   No aches anywhere no no   Thinking of death or  dying no no   Hot tempered no no   Terrified no no   Feeling of courage yes yes   Enjoying yourself yes yes   Breathing difficult no no   Parts of the body feel numb or tingling yes yes   Takes a long time to fall asleep yes yes   Feeling hostile no no   Infuriated no no   Heart beating fast or pounding no no   Depressed no no   Jumpy no no   Feeling a failure no no   Not interested in things no no   Highly strung no no   Cannot relax no no   Panicky no no   Pressure on head no yes   Blaming yourself no no   Thoughts of ending your life no no   Frightening thoughts no no   Enraged no no   Irritated by other people yes no   Looking forward toward the future yes yes   Nauseated, sick to stomach no no   Feeling that life is bad no no   Upset bowels or stomach no no   Feeling inferior to others no no   Feeling useless no no   Muscle pains yes no   No unpleasant feelings in head or body no no   Headaches yes yes   Feel like attacking people no no   Shaking with anger no no   Mad no no   Feeling goodwill yes yes   Feel like crying no no   Cramps no no   Feeling that something bad will happen no no   Wound up, uptight no no   Get angry quickly no no   Self-confident yes yes   Resentful no no   Feeling of hopelessness no no   Head pains yes yes              SF-36 SCORES:        8/8/2024 11/6/2024   SF 36 HEALTH QUESTIONNAIRE   OVERALL TOTAL SCORE     Physical Functioning 90 90   Role limitations due to physical health 100 100   Role limitations due to emotional problems 100 100   Energy/fatigue 60 65   Emotional well-being 76 88   Social functioning 100 100   Pain 70 70   General health 55 65   --     QUESTIONNAIRE ANSWERS     In general, how would you say your health is? good good   Compared to one year ago, how would you rate your health in general now? about the same much better now than one year ago   VIGOROUS ACTIVITIES (running, lifting heavy objects, strenuous sport) yes, limited a little yes, limited a little    MODERATE ACTIVITES (moving a table, vacuuming, bowling or golf) no, not limited at all no, not limited at all   Lifting or carrying groceries No not limited No not limited   Climbing SEVERAL flights of stairs No not limited No not limited   Climbing ONE flight of stairs no, not limited at all no, not limited at all   Bending or kneeling yes, limited a little yes, limited a little   Walking more than a mile no, not limited at all no, not limited at all   Walking 100 yards (150-200 paces) no, not limited at all no, not limited at all   Walking 100 yards (150-200 paces) No not limited No not limited   Bathing and dressing yourself no, not limited at all no, not limited at all   Cut down on the amount of time you spent on work or other activities no no   Accomplished less than you would have liked no no   Were limited in the kind of work or other activities no no   Had difficulty performing the work or other activities (i.e. took extra effort) no no   Cut down on the amount of time you spent on work or other activities no no   Accomplished less than you would like no no   Did not do work or other activities as carefully as usual no no   During the past four weeks, to what extent has your physical health or emotional problems interfered with your normal social activities with family, friends, neighbors or groups? not at all not at all   How much bodily pain have you had in the last four weeks? moderate moderate   During the past four weeks, how much did pain interfere with your normal work (including work both outside the home and housework)? not at all not at all   Did you feel full of pep? some of the time good bit of the time   Have you been a very nervous person? a little bit of the time none of the time   Have you felt so down in the dumps that nothing could cheer you up? none of the time none of the time   Have you felt calm and peaceful? good bit of the time most of the time   Did you have a lot of energy? good  bit of the time good bit of the time   Have you felt downhearted and blue? a little bit of the time a little bit of the time   Did you feel worn out? a little bit of the time a little bit of the time   Have you been a happy person? good bit of the time most of the time   Did you feel tired? some of the time some of the time   Has your health limited your social activities such as visiting relatives or friends? none of the time none of the time   I seem to get ill more easily than other people. not sure not sure   I am as healthy as anybody I know. mostly true mostly true   I expect my health to get worse. not sure mostly false   My health is excellent. not sure mostly true                 PHQ 9:      4/12/2024     8:25 AM 8/8/2024     8:14 AM 11/6/2024     5:20 PM   PHQ-9 Depression Patient Health Questionnaire   Over the last two weeks how often have you been bothered by little interest or pleasure in doing things 0 0 0   Over the last two weeks how often have you been bothered by feeling down, depressed or hopeless 0 0 0   Over the last two weeks how often have you been bothered by trouble falling or staying asleep, or sleeping too much  1 1   Over the last two weeks how often have you been bothered by feeling tired or having little energy  1 0   Over the last two weeks how often have you been bothered by a poor appetite or overeating  1 0   Over the last two weeks how often have you been bothered by feeling bad about yourself - or that you are a failure or have let yourself or your family down  0 0   Over the last two weeks how often have you been bothered by trouble concentrating on things, such as reading the newspaper or watching television  1 1   Over the last two weeks how often have you been bothered by moving or speaking so slowly that other people could have noticed.  0 0   Over the last two weeks how often have you been bothered by thoughts that you would be better off dead, or of hurting yourself  0 0    If you checked off any problems, how difficult have these problems made it for you to do your work, take care of things at home or get along with other people?  Somewhat difficult Not difficult at all   PHQ-9 Score  4 2            Family Support: children and spouse  Self Reported: Effective Coping Skills  Displays: happiness and calmness    Psychosocial Plan:   Goals:  Verbalizes coping mechanisms: Met  Maintain positive support system: Met  Maintain positive outlook: Met  Improve overall quality of life: Met    Comments on Goal Progression:  Patient has met all goals with improvement in stress reduction, improved scores on Jose, SF-36 and PHQ-9.    Interventions/Recommendations:  Discussed Results of Surveys: Yes  Notify MD: No  Program Referral: No  Pharmaceutical Intervention/Therapy: No  Physical Activity: Yes  Continue Patient Education: Yes  Other Needs: not applicable  Stage of Readiness to Change: Action    Education:  Signs and Symptoms of Depression; verbalizes understanding; Date: 9/20/24  Stress Management; verbalizes understanding; Date: 9/20/24  Depression; verbalizes understanding; Date: 10/11/24  Stress; verbalizes understanding; Date: 9/20/24  Insomnia; verbalizes understanding; Date: 9/20/24  Risk Factors; verbalizes understanding; Date: 9/6/24    Discussed screening tools and results, patient had improvement in all areas and reports he is feeling better since attending cardiac rehab.Patient has been instructed to seek attention via PCP/Psychiatry in the event that circumstances change. Patient verbalizes understanding.     Other Core Components/Risk Factors Assessment:   RISK FACTORS:  diabetes, hyperlipidemia, hypertension, obesity, positive family history, sedentary lifestyle    Learning Barriers: None    Education Level:  Post-College Graduate Degree    Pre-Test Score Post-Test Score   90 90     Medication Compliance: has been compliant with taking medications    Other Core  Components/Risk Factors Plan:   Goals:  Decrease cholesterol level: Met  Increase exercise tolerance: Met  Increase knowledge of CAD: Met  Decrease blood pressure: Not Met: 11/11/24  Weight loss: Met  Control diabetes by adjusting diet and exercise: Met    Comments on Goal Progression:  Patient had improvements in labs, exercise tolerance and A1C since starting cardiac rehab.     Interventions/Recommendations:  Individual Education/Counseling: Yes  Physician Referral: No    Education:    blood pressure meds, verbalizes understanding; Date: 9/27/24  cholesterol, verbalizes understanding; Date: 9/4/24  cholesterol meds, verbalizes understanding; Date: 9/27/24  fluid overload/CHF, verbalizes understanding; Date: 8/30/24  hypertension, verbalizes understanding; Date: 9/13/24  nutrition, verbalizes understanding; Date: 10/16/24  physical activity, verbalizes understanding; Date: 9/23/24  risk factors, verbalizes understanding; Date: 9/6/24  sodium, verbalizes understanding; Date: 9/25/24  stress, verbalizes understanding; Date: 9/20/24      Patient attended sessions regularly and participated in educational lectures. Patient worked hard at exercise and dietary lifestyle modifications which are evident in his lab findings. Positive reinforcement given for pt efforts. Patient verbalized understanding.          Other Core Components/Hypertension Assessment:   BP Range: 148-194/70-92  Patient reported symptoms: none    Medications:  Medication Prescribed? Adherent? Exception   Beta-blocker []Yes  [x]No  []Unknown []Yes  []No  []Unknown    ACEI/ARB [x]Yes  []No  []Unknown [x]Yes  []No  []Unknown    Calcium Channel Blocker [x]Yes  []No  []Unknown [x]Yes  []No  []Unknown    Diuretic []Yes  [x]No  []Unknown []Yes  []No  []Unknown        Other Core Components/Hypertension Plan:   Goals:  Blood Pressure <130/80: Not Met: Patient has had medication adjustments with some improvement in reading yet still not at goal.    Comments on  Goal Progression:  Patient continues to monitor his blood pressure at home and communicate with his provider to work toward BP at goal.     Interventions/Recommendations:  Med Card Reconciled: Yes  Encourage medication compliance  Encourage sodium reduction  Reduce alcohol consumption  Encourage weight loss  Recommend physical activity  Educate on contributory factors  Reduce stress, anxiety, anger, depression, and/or chronic pain  Encourage home blood pressure monitoring  Recommend daily weights    Comments on Goal Progression:  See monthly report in Epic for blood pressure trends  Information given to patient for [facility]: Yes  Pt has plans to join [facility] once he has surgery  on Friday and is cleared after to exercise.     Education:    Hypertension; verbalizes understanding; Date: 9/13/24  Coronary Artery Disease; verbalizes understanding; Date: 8/23/24  Congestive Heart Failure; verbalizes understanding; Date: 8/30/24  Risk Factors; verbalizes understanding; Date: 9/6/24  Medication I; verbalizes understanding; Date: 9/27/24  Stroke; verbalizes understanding; Date: 10/18/24  Stress; verbalizes understanding; Date: 9/20/24         Patient continues to monitor his blood pressure at home with communication with his providers to achieve better control.     Does the patient have Heart Failure? No    Other Core Components/Tobacco Cessation Assessment:   Smoking Status: Lifetime Non-smoker  Primary Tobacco Type: Cigar  Tobacco Usage: no  Smoking Cessation Barriers:  NA  Stage of Readiness to Change: Maintenance    Other Core Components/Tobacco Cessation Plan:   Goals:  Maintain non-smoking status: Met    Comments on Goal Progression:  Maintains nonsmoker status    Interventions/Recommendations:  Maintains non-smoking status    Education:    Heart and Lung Anatomy; verbalizes understanding; Date: 8/16/24  Coronary Artery Disease; verbalizes understanding; Date: 8/23/24  Risk Factors; verbalizes  understanding; Date: 9/6/24  Hypertension; verbalizes understanding; Date: 9/13/24  Medications I; verbalizes understanding; Date: 9/27/24  Stroke; verbalizes understanding; Date: 10/18/24  Benefits of Cardiac Rehab; verbalizes understanding; Date: 105/24           Comments:   Patient denies any issues with overwhelming stress, anxiety, depression at this time. Reports it has improved since attending cardiac rehab. He continues with dietary and exercise recommendations. He has a thyroidectomy scheduled on Friday and will resume his exercise once cleared after the surgery.  Patient has been instructed to follow up with Cardiologist for any further cardiac issues. Information on [facility] at [facility] given to patient.      Amauri Bledsoe RN  Cardiac Rehab Nurse

## 2024-11-11 NOTE — PROGRESS NOTES
Exit   Cardiac Rehab Individual Treatment Plan - Discharge Assessment      Patient Name: Yuriy Jerome MRN: [id number]   : 1955   Age: 69 y.o.   Primary Diagnosis: PTCA/stent    Nutrition Assessment:     Anthropometrics Pre Post   Height 73 inches 73 inches   Weight 249 lbs 240 lbs   BMI 33 31.8   Abdominal Girth 48.5 47.5   Body Composition 24.3 18.3       Labs:  Patient confirms he is taking Crestor 20mg for cholesterol control.    Lab Results   Component Value Date    CHOL 113 (L) 2024    CHOL 108 (L) 10/08/2024    CHOL 140 2024     Lab Results   Component Value Date    HDL 40 2024    HDL 37 (L) 10/08/2024    HDL 38 (L) 2024     Lab Results   Component Value Date    LDLCALC 50.8 (L) 2024    LDLCALC 45.4 (L) 10/08/2024    LDLCALC 59.2 (L) 2024     Lab Results   Component Value Date    TRIG 111 2024    TRIG 128 10/08/2024    TRIG 214 (H) 2024     Lab Results   Component Value Date    CHOLHDL 35.4 2024    CHOLHDL 34.3 10/08/2024    CHOLHDL 27.1 2024       Lab Results   Component Value Date    GLUF 116 (H) 2024     Lab Results   Component Value Date    HGBA1C 6.0 (H) 2024       Nutrition/Diet History:  Patient eats 2 meals daily.    Seasons food with hot sauce/fresh herbs/salt free blends.  Patient denies use of a salt shaker at the table on prepared foods.   Dines out 2 per week.  Chooses fried foods rarely   Chooses fish 3-4 time(s) per week.   Beverages:  water and coffee without sugar  Alcohol: glass of wine with dinner  Current Exercise: See Exercise Physiologist Note  Food Safety/Food Preparation: self  Living Arrangements/Family Support: Lives with spouse  Stage of Change Related to Diet Habits: Maintenance  Recent Changes to Diet: No  Food Diary: Completed      Nutrition Plan:   Goals:  2 gram sodium, Mediterranean diet: Met  Rehab weight loss goal of 10 lbs, 1 lb per week: In Progress    Comments of Goal Progression:  Pt lost 6%  body far and 9lbs thus far. He states he feels much better and notices he has more energy. He and wife are both very mindful of Mediterranean diet and limiting alcohol and red meat.    Interventions/Recommendations:  Reviewed Anthropometric Progress  Reviewed Pre and Post Labs  Dietitian Consult: No  Nutrition Recommendations Provided: Verbal, Reviewed  Discussed follow up plan for ongoing self-management support    Education:  Food Labels; verbalizes understanding; Date: 11/6/24  Sugar and Carbohydrates; verbalizes understanding; Date: 10/23/24  Mediterranean Diet; verbalizes understanding; Date: 10/16/24  Seafood; verbalizes understanding; Date: 10/30/24    Comments:   Discussed ways to continue to incorporate healthy snacks, eating on a schedule, and monitoring sodium intake for heart health.     Diabetes  Is the patient diabetic? Yes   Other Core Components/Diabetes Assessment:   Labs:  Lab Results   Component Value Date    GLUF 116 (H) 11/05/2024    GLUF 140 (H) 08/06/2024     Lab Results   Component Value Date    HGBA1C 6.0 (H) 11/05/2024    HGBA1C 6.0 (H) 10/08/2024    HGBA1C 6.4 (H) 08/06/2024      Lab Results   Component Value Date    ESTIMATEDAVG 126 11/05/2024    ESTIMATEDAVG 126 10/08/2024    ESTIMATEDAVG 137 (H) 08/06/2024       History of diabetes since 2023  Diabetes medications: metformin 1000mg BID  Blood Glucose Checks at Home: No  Endocrinologist or PCP following DM: Dr. Jerome PCP    Other Core Components/Diabetes Plan:   Goals:  Hgb A1c < 7%  Exercise Blood Glucose: 100-300 mg/dl    Interventions:  Reviewed and Discussed Labs  Medication Compliance  Med Card Reconciled  Low Sodium, Mediterranean, ADA Diet  Weight Management  Physical Activity  Increase Knowledge of Contributory Factors  Home Monitoring    Education:  Mediterranean Diet; verbalizes understanding; Date: 10/16/24  Sugar and Carbohydrates; verbalizes understanding; Date: 10/23/24    Comments:     A1c reduced to 6.0 with exercise  and weight loss. He is adherent with medications.     Em Herman MS, RDN/LDN

## 2024-11-12 ENCOUNTER — TELEPHONE (OUTPATIENT)
Dept: CARDIOLOGY | Facility: CLINIC | Age: 69
End: 2024-11-12
Payer: MEDICARE

## 2024-11-12 NOTE — TELEPHONE ENCOUNTER
"Pt dropped off at our clinic 11-  Cardiac clearance for from "[facility]".  Was  put on  desk and a copy was scanned into pt chart.    Nw  "

## 2024-11-14 ENCOUNTER — ANESTHESIA EVENT (OUTPATIENT)
Dept: SURGERY | Facility: HOSPITAL | Age: 69
End: 2024-11-14
Payer: MEDICARE

## 2024-11-14 ENCOUNTER — PATIENT MESSAGE (OUTPATIENT)
Dept: OTOLARYNGOLOGY | Facility: CLINIC | Age: 69
End: 2024-11-14
Payer: MEDICARE

## 2024-11-14 NOTE — ANESTHESIA PREPROCEDURE EVALUATION
11/14/2024  Yuriy Jerome is a 69 y.o., male presenting for b/l total thyroidectomy    Patient Active Problem List   Diagnosis    Obstructive sleep apnea    Insomnia    Hyperlipidemia    Essential hypertension    Benign prostatic hyperplasia    Obesity (BMI 35.0-39.9 without comorbidity)    Type 2 diabetes mellitus with other specified complication, without long-term current use of insulin    Subluxation of tarsal joint of left foot    Morbid (severe) obesity due to excess calories    Stented coronary artery    Thyroid nodule    Coronary atherosclerosis of native coronary artery     Current Outpatient Medications   Medication Instructions    alpha lipoic acid 600 mg Cap Take by mouth.    amLODIPine (NORVASC) 10 mg, Oral, Daily    aspirin (ECOTRIN) 81 mg, Daily    blood-glucose meter (ONETOUCH VERIO REFLECT START) kit Use as instructed    clopidogreL (PLAVIX) 75 mg, Oral, Daily    coQ10, ubiquinol, 200 mg Cap No dose, route, or frequency recorded.    finasteride (PROSCAR) 5 mg, Oral, Daily    fluticasone propionate (FLONASE) 50 mcg/actuation nasal spray fluticasone propionate 50 mcg/actuation nasal spray,suspension    lancets (ONETOUCH DELICA PLUS LANCET) 30 gauge Misc 1 lancet , Misc.(Non-Drug; Combo Route), 2 times daily    losartan-hydrochlorothiazide 100-12.5 mg (HYZAAR) 100-12.5 mg Tab 1 tablet, Oral    metFORMIN (GLUCOPHAGE) 1,000 mg, Oral, 2 times daily with meals    mirabegron (MYRBETRIQ) 50 mg, Oral, Daily    multivit with minerals/lutein (MULTIVITAMIN 50 PLUS ORAL) Take by mouth.    nitroGLYCERIN (NITROSTAT) 0.4 mg, Sublingual, Every 5 min PRN    omega-3 fatty acids/fish oil (FISH OIL-OMEGA-3 FATTY ACIDS) 300-1,000 mg capsule Daily    ONETOUCH VERIO TEST STRIPS Strp USE 1 TEST STRIP TWICE DAILY    rosuvastatin (CRESTOR) 20 mg, Oral    UNABLE TO FIND osteobiflex          Pre-op Assessment    I have  reviewed the Patient Summary Reports.     I have reviewed the Nursing Notes. I have reviewed the NPO Status.   I have reviewed the Medications.     Review of Systems  Anesthesia Hx:   History of prior surgery of interest to airway management or planning:  Previous anesthesia: General Left talonavicular arthrodesis, Left subtalar arthrodesis, Left posterior tibial tendon repair, Left transfer of the posterior tibial tendon to the talus, Transfer of the left flexor digitorum longus tendon to the navicular, Intraoperative fluoroscopy  (Left: Ankle) 5/19/22 with general anesthesia.  Procedure performed at an [facility].      Airway issues documented on chart review include laryngeal mask airway used     Denies Family Hx of Anesthesia complications.   Personal Hx of Anesthesia complications               Unintended Unpleasent Intraoperative Awareness      Social:  Non-Smoker, Alcohol Use       Hematology/Oncology:  Hematology Normal   Oncology Normal                                   EENT/Dental:   THYROID NODULE          Cardiovascular:     Hypertension   CAD       Denies Angina.     hyperlipidemia       Functional Capacity good / => 4 METS   Coronary Artery Disease:      S/P Percutaneous Coronary Intervention (PCI)   S/P Drug Eluting Stent (PRAVEENA), drug eluding stent placed 2024.                           Pulmonary:       Denies Shortness of breath.  Denies Recent URI. Sleep Apnea                Renal/:  Renal/ Normal                 Hepatic/GI:  Hepatic/GI Normal                    Musculoskeletal:  Musculoskeletal Normal                Neurological:  Neurology Normal                                      Endocrine:  Diabetes         Obesity / BMI > 30  Psych:  Psychiatric Normal                    Physical Exam  General: Alert, Oriented and Cooperative    Airway:  Mallampati: II   Mouth Opening: Normal  TM Distance: Normal  Tongue: Normal  Neck ROM: Normal ROM    Dental:  Intact          Anesthesia Assessment:  Preoperative EQUATION    Planned Procedure: Procedure(s) (LRB):  THYROIDECTOMY, TOTAL (Bilateral)  Requested Anesthesia Type:General  Surgeon: Vlad Avelar MD  Service: ENT  Known or anticipated Date of Surgery:11/15/2024    Surgeon notes: reviewed    Electronic QUestionnaire Assessment completed via nurse interview with patient.        Triage considerations:     The patient has no apparent active cardiac condition (No unstable coronary Syndrome such as severe unstable angina or recent [<1 month] myocardial infarction, decompensated CHF, severe valvular   disease or significant arrhythmia)    Previous anesthesia records:GETA, LMA General, and No problems    Airway:  Mallampati: I   Mouth Opening: Normal  TM Distance: Normal  Neck ROM: Normal ROM    Last PCP note: within 3 months , within [facility]   Subspecialty notes: Cardiology: General, ENT    Other important co-morbidities: DM2, HLD, HTN, Obesity, and LACHO      Tests already available:  Available tests,  within 3 months , within [facility] .     10/8/24  URINE, CBC, A1C, LIPID PANEL, CMP    8/6/24  EKG    6/20/24  STRESS ECHO            Instructions given. (See in Nurse's note)    Optimization:  Anesthesia Preop Clinic Assessment NOT Indicated           Sub-specialist consult indicated:   [facility] CARDIOLOGY WITH MEDICATION INSTRUCTION       Plan:    Testing:  None Needed        Consultation: PATIENT'S CARDIOLOGIST     Patient  has previously scheduled Medical Appointment: 10/30, 11/1, 11/4, 11/5, 11/6, 11/8, 11/11, 11/13    Navigation:              Consults scheduled.    Plan to have thyroidectomy due to concerns about atypical cells.  Okay to hold Plavix 3-5 days before the procedure and restart post.  However he can not stop aspirin preprocedure.  Needs to continue aspirin perioperatively.   Created proceed with planned surgery from cardiac standpoint  Raji Noble MD  Cardiology     SURGEON NOTIFIED OF RECOMMENDATION TO REMAIN ON ASA. OKAY TO PROCEED.              Anesthesia Plan  Type of Anesthesia, risks & benefits discussed:    Anesthesia Type: Gen ETT  Intra-op Monitoring Plan: Standard ASA Monitors  Post Op Pain Control Plan: multimodal analgesia  Induction:  IV  Airway Plan: Video, Post-Induction  Informed Consent: Informed consent signed with the Patient and all parties understand the risks and agree with anesthesia plan.  All questions answered.   ASA Score: 3  Day of Surgery Review of History & Physical: H&P Update referred to the surgeon/provider.    Ready For Surgery From Anesthesia Perspective.     .

## 2024-11-15 ENCOUNTER — ANESTHESIA (OUTPATIENT)
Dept: SURGERY | Facility: HOSPITAL | Age: 69
End: 2024-11-15
Payer: MEDICARE

## 2024-11-15 ENCOUNTER — HOSPITAL ENCOUNTER (OUTPATIENT)
Facility: HOSPITAL | Age: 69
Discharge: HOME | End: 2024-11-16
Attending: OTOLARYNGOLOGY | Admitting: OTOLARYNGOLOGY
Payer: MEDICARE

## 2024-11-15 DIAGNOSIS — E04.1 THYROID NODULE: Primary | ICD-10-CM

## 2024-11-15 LAB — PTH-INTACT SERPL-MCNC: 41.9 PG/ML (ref 9–77)

## 2024-11-15 PROCEDURE — 94761 N-INVAS EAR/PLS OXIMETRY MLT: CPT

## 2024-11-15 PROCEDURE — 94799 UNLISTED PULMONARY SVC/PX: CPT

## 2024-11-15 PROCEDURE — 25000003 PHARM REV CODE 250: Performed by: STUDENT IN AN ORGANIZED HEALTH CARE EDUCATION/TRAINING PROGRAM

## 2024-11-15 PROCEDURE — 37000009 HC ANESTHESIA EA ADD 15 MINS: Performed by: OTOLARYNGOLOGY

## 2024-11-15 PROCEDURE — 71000015 HC POSTOP RECOV 1ST HR: Performed by: OTOLARYNGOLOGY

## 2024-11-15 PROCEDURE — 63600175 PHARM REV CODE 636 W HCPCS

## 2024-11-15 PROCEDURE — 71000033 HC RECOVERY, INTIAL HOUR: Performed by: OTOLARYNGOLOGY

## 2024-11-15 PROCEDURE — 71000016 HC POSTOP RECOV ADDL HR: Performed by: OTOLARYNGOLOGY

## 2024-11-15 PROCEDURE — 63600175 PHARM REV CODE 636 W HCPCS: Performed by: OTOLARYNGOLOGY

## 2024-11-15 PROCEDURE — 88305 TISSUE EXAM BY PATHOLOGIST: CPT | Performed by: STUDENT IN AN ORGANIZED HEALTH CARE EDUCATION/TRAINING PROGRAM

## 2024-11-15 PROCEDURE — 63600175 PHARM REV CODE 636 W HCPCS: Performed by: STUDENT IN AN ORGANIZED HEALTH CARE EDUCATION/TRAINING PROGRAM

## 2024-11-15 PROCEDURE — 27201423 OPTIME MED/SURG SUP & DEVICES STERILE SUPPLY: Performed by: OTOLARYNGOLOGY

## 2024-11-15 PROCEDURE — 60240 REMOVAL OF THYROID: CPT | Mod: ,,, | Performed by: OTOLARYNGOLOGY

## 2024-11-15 PROCEDURE — C1729 CATH, DRAINAGE: HCPCS | Performed by: OTOLARYNGOLOGY

## 2024-11-15 PROCEDURE — 88307 TISSUE EXAM BY PATHOLOGIST: CPT | Performed by: STUDENT IN AN ORGANIZED HEALTH CARE EDUCATION/TRAINING PROGRAM

## 2024-11-15 PROCEDURE — 36000707: Performed by: OTOLARYNGOLOGY

## 2024-11-15 PROCEDURE — 37000008 HC ANESTHESIA 1ST 15 MINUTES: Performed by: OTOLARYNGOLOGY

## 2024-11-15 PROCEDURE — 36000706: Performed by: OTOLARYNGOLOGY

## 2024-11-15 PROCEDURE — 83970 ASSAY OF PARATHORMONE: CPT | Performed by: STUDENT IN AN ORGANIZED HEALTH CARE EDUCATION/TRAINING PROGRAM

## 2024-11-15 RX ORDER — LIDOCAINE HYDROCHLORIDE AND EPINEPHRINE 10; 10 UG/ML; MG/ML
INJECTION, SOLUTION INFILTRATION; PERINEURAL
Status: DISCONTINUED | OUTPATIENT
Start: 2024-11-15 | End: 2024-11-15 | Stop reason: HOSPADM

## 2024-11-15 RX ORDER — ACETAMINOPHEN 325 MG/1
650 TABLET ORAL EVERY 4 HOURS PRN
Status: DISCONTINUED | OUTPATIENT
Start: 2024-11-15 | End: 2024-11-16 | Stop reason: HOSPADM

## 2024-11-15 RX ORDER — HYDROCODONE BITARTRATE AND ACETAMINOPHEN 5; 325 MG/1; MG/1
1 TABLET ORAL EVERY 4 HOURS PRN
Status: DISCONTINUED | OUTPATIENT
Start: 2024-11-15 | End: 2024-11-16 | Stop reason: HOSPADM

## 2024-11-15 RX ORDER — LOSARTAN POTASSIUM AND HYDROCHLOROTHIAZIDE 12.5; 1 MG/1; MG/1
1 TABLET ORAL DAILY
Status: DISCONTINUED | OUTPATIENT
Start: 2024-11-15 | End: 2024-11-16 | Stop reason: HOSPADM

## 2024-11-15 RX ORDER — ACETAMINOPHEN 325 MG/1
650 TABLET ORAL EVERY 6 HOURS PRN
Status: DISCONTINUED | OUTPATIENT
Start: 2024-11-15 | End: 2024-11-16 | Stop reason: HOSPADM

## 2024-11-15 RX ORDER — SODIUM CHLORIDE 9 MG/ML
INJECTION, SOLUTION INTRAVENOUS CONTINUOUS
Status: DISCONTINUED | OUTPATIENT
Start: 2024-11-15 | End: 2024-11-15

## 2024-11-15 RX ORDER — GLUCAGON 1 MG
1 KIT INJECTION
Status: DISCONTINUED | OUTPATIENT
Start: 2024-11-15 | End: 2024-11-15 | Stop reason: HOSPADM

## 2024-11-15 RX ORDER — LIDOCAINE HYDROCHLORIDE 10 MG/ML
1 INJECTION, SOLUTION EPIDURAL; INFILTRATION; INTRACAUDAL; PERINEURAL ONCE AS NEEDED
Status: DISCONTINUED | OUTPATIENT
Start: 2024-11-15 | End: 2024-11-15

## 2024-11-15 RX ORDER — CEFAZOLIN SODIUM 1 G/3ML
INJECTION, POWDER, FOR SOLUTION INTRAMUSCULAR; INTRAVENOUS
Status: DISCONTINUED | OUTPATIENT
Start: 2024-11-15 | End: 2024-11-15

## 2024-11-15 RX ORDER — ONDANSETRON 8 MG/1
8 TABLET, ORALLY DISINTEGRATING ORAL EVERY 8 HOURS PRN
Status: DISCONTINUED | OUTPATIENT
Start: 2024-11-15 | End: 2024-11-16 | Stop reason: HOSPADM

## 2024-11-15 RX ORDER — KETOROLAC TROMETHAMINE 30 MG/ML
INJECTION, SOLUTION INTRAMUSCULAR; INTRAVENOUS
Status: DISCONTINUED | OUTPATIENT
Start: 2024-11-15 | End: 2024-11-15

## 2024-11-15 RX ORDER — HYDROMORPHONE HYDROCHLORIDE 1 MG/ML
0.2 INJECTION, SOLUTION INTRAMUSCULAR; INTRAVENOUS; SUBCUTANEOUS EVERY 5 MIN PRN
Status: DISCONTINUED | OUTPATIENT
Start: 2024-11-15 | End: 2024-11-15 | Stop reason: HOSPADM

## 2024-11-15 RX ORDER — CALCIUM CARBONATE 200(500)MG
1000 TABLET,CHEWABLE ORAL 2 TIMES DAILY
Status: DISCONTINUED | OUTPATIENT
Start: 2024-11-15 | End: 2024-11-16 | Stop reason: HOSPADM

## 2024-11-15 RX ORDER — PROPOFOL 10 MG/ML
VIAL (ML) INTRAVENOUS
Status: DISCONTINUED | OUTPATIENT
Start: 2024-11-15 | End: 2024-11-15

## 2024-11-15 RX ORDER — SODIUM CHLORIDE 0.9 % (FLUSH) 0.9 %
10 SYRINGE (ML) INJECTION
Status: DISCONTINUED | OUTPATIENT
Start: 2024-11-15 | End: 2024-11-15 | Stop reason: HOSPADM

## 2024-11-15 RX ORDER — HALOPERIDOL 5 MG/ML
0.5 INJECTION INTRAMUSCULAR EVERY 10 MIN PRN
Status: DISCONTINUED | OUTPATIENT
Start: 2024-11-15 | End: 2024-11-15 | Stop reason: HOSPADM

## 2024-11-15 RX ORDER — CEFAZOLIN 2 G/1
2 INJECTION, POWDER, FOR SOLUTION INTRAMUSCULAR; INTRAVENOUS
Status: COMPLETED | OUTPATIENT
Start: 2024-11-15 | End: 2024-11-16

## 2024-11-15 RX ORDER — DEXAMETHASONE SODIUM PHOSPHATE 4 MG/ML
INJECTION, SOLUTION INTRA-ARTICULAR; INTRALESIONAL; INTRAMUSCULAR; INTRAVENOUS; SOFT TISSUE
Status: DISCONTINUED | OUTPATIENT
Start: 2024-11-15 | End: 2024-11-15

## 2024-11-15 RX ORDER — ONDANSETRON HYDROCHLORIDE 2 MG/ML
INJECTION, SOLUTION INTRAVENOUS
Status: DISCONTINUED | OUTPATIENT
Start: 2024-11-15 | End: 2024-11-15

## 2024-11-15 RX ORDER — ACETAMINOPHEN 10 MG/ML
1000 INJECTION, SOLUTION INTRAVENOUS ONCE
Status: COMPLETED | OUTPATIENT
Start: 2024-11-15 | End: 2024-11-15

## 2024-11-15 RX ORDER — FENTANYL CITRATE 50 UG/ML
INJECTION, SOLUTION INTRAMUSCULAR; INTRAVENOUS
Status: DISCONTINUED | OUTPATIENT
Start: 2024-11-15 | End: 2024-11-15

## 2024-11-15 RX ORDER — PROCHLORPERAZINE EDISYLATE 5 MG/ML
5 INJECTION INTRAMUSCULAR; INTRAVENOUS EVERY 6 HOURS PRN
Status: DISCONTINUED | OUTPATIENT
Start: 2024-11-15 | End: 2024-11-16 | Stop reason: HOSPADM

## 2024-11-15 RX ORDER — ENOXAPARIN SODIUM 100 MG/ML
40 INJECTION SUBCUTANEOUS EVERY 24 HOURS
Status: DISCONTINUED | OUTPATIENT
Start: 2024-11-15 | End: 2024-11-16 | Stop reason: HOSPADM

## 2024-11-15 RX ORDER — AMLODIPINE BESYLATE 5 MG/1
10 TABLET ORAL DAILY
Status: DISCONTINUED | OUTPATIENT
Start: 2024-11-15 | End: 2024-11-16 | Stop reason: HOSPADM

## 2024-11-15 RX ORDER — LIDOCAINE HYDROCHLORIDE 40 MG/ML
SOLUTION TOPICAL
Status: DISCONTINUED | OUTPATIENT
Start: 2024-11-15 | End: 2024-11-15

## 2024-11-15 RX ORDER — TALC
6 POWDER (GRAM) TOPICAL NIGHTLY PRN
Status: DISCONTINUED | OUTPATIENT
Start: 2024-11-15 | End: 2024-11-16 | Stop reason: HOSPADM

## 2024-11-15 RX ORDER — HYDROMORPHONE HYDROCHLORIDE 1 MG/ML
1 INJECTION, SOLUTION INTRAMUSCULAR; INTRAVENOUS; SUBCUTANEOUS EVERY 4 HOURS PRN
Status: DISCONTINUED | OUTPATIENT
Start: 2024-11-15 | End: 2024-11-16 | Stop reason: HOSPADM

## 2024-11-15 RX ORDER — AMOXICILLIN 250 MG
1 CAPSULE ORAL 2 TIMES DAILY
Status: DISCONTINUED | OUTPATIENT
Start: 2024-11-15 | End: 2024-11-16 | Stop reason: HOSPADM

## 2024-11-15 RX ORDER — CEPHALEXIN 500 MG/1
500 CAPSULE ORAL EVERY 6 HOURS
OUTPATIENT
Start: 2024-11-15

## 2024-11-15 RX ORDER — HYDROMORPHONE HYDROCHLORIDE 1 MG/ML
INJECTION, SOLUTION INTRAMUSCULAR; INTRAVENOUS; SUBCUTANEOUS
Status: COMPLETED
Start: 2024-11-15 | End: 2024-11-15

## 2024-11-15 RX ORDER — SUCCINYLCHOLINE CHLORIDE 20 MG/ML
INJECTION INTRAMUSCULAR; INTRAVENOUS
Status: DISCONTINUED | OUTPATIENT
Start: 2024-11-15 | End: 2024-11-15

## 2024-11-15 RX ADMIN — DEXAMETHASONE SODIUM PHOSPHATE 4 MG: 4 INJECTION, SOLUTION INTRAMUSCULAR; INTRAVENOUS at 07:11

## 2024-11-15 RX ADMIN — CALCIUM CARBONATE (ANTACID) CHEW TAB 500 MG 1000 MG: 500 CHEW TAB at 10:11

## 2024-11-15 RX ADMIN — SENNOSIDES AND DOCUSATE SODIUM 1 TABLET: 50; 8.6 TABLET ORAL at 09:11

## 2024-11-15 RX ADMIN — HYDROMORPHONE HYDROCHLORIDE 0.2 MG: 1 INJECTION, SOLUTION INTRAMUSCULAR; INTRAVENOUS; SUBCUTANEOUS at 10:11

## 2024-11-15 RX ADMIN — HYDROMORPHONE HYDROCHLORIDE 1 MG: 1 INJECTION, SOLUTION INTRAMUSCULAR; INTRAVENOUS; SUBCUTANEOUS at 04:11

## 2024-11-15 RX ADMIN — SODIUM CHLORIDE, SODIUM LACTATE, POTASSIUM CHLORIDE, AND CALCIUM CHLORIDE: .6; .31; .03; .02 INJECTION, SOLUTION INTRAVENOUS at 07:11

## 2024-11-15 RX ADMIN — KETOROLAC TROMETHAMINE 30 MG: 30 INJECTION, SOLUTION INTRAMUSCULAR; INTRAVENOUS at 09:11

## 2024-11-15 RX ADMIN — HYDROCODONE BITARTRATE AND ACETAMINOPHEN 1 TABLET: 5; 325 TABLET ORAL at 10:11

## 2024-11-15 RX ADMIN — CEFAZOLIN 2 G: 2 INJECTION, POWDER, FOR SOLUTION INTRAMUSCULAR; INTRAVENOUS at 04:11

## 2024-11-15 RX ADMIN — CALCIUM CARBONATE (ANTACID) CHEW TAB 500 MG 1000 MG: 500 CHEW TAB at 09:11

## 2024-11-15 RX ADMIN — FENTANYL CITRATE 100 MCG: 50 INJECTION, SOLUTION INTRAMUSCULAR; INTRAVENOUS at 07:11

## 2024-11-15 RX ADMIN — SUCCINYLCHOLINE 200 MG: 20 INJECTION, SOLUTION INTRAMUSCULAR; INTRAVENOUS at 07:11

## 2024-11-15 RX ADMIN — ACETAMINOPHEN 1000 MG: 10 INJECTION, SOLUTION INTRAVENOUS at 10:11

## 2024-11-15 RX ADMIN — CEFAZOLIN 2 G: 330 INJECTION, POWDER, FOR SOLUTION INTRAMUSCULAR; INTRAVENOUS at 07:11

## 2024-11-15 RX ADMIN — LOSARTAN POTASSIUM AND HYDROCHLOROTHIAZIDE 1 TABLET: 100; 12.5 TABLET, FILM COATED ORAL at 11:11

## 2024-11-15 RX ADMIN — LIDOCAINE HYDROCHLORIDE 3 ML: 40 SOLUTION TOPICAL at 07:11

## 2024-11-15 RX ADMIN — PROPOFOL 250 MG: 10 INJECTION, EMULSION INTRAVENOUS at 07:11

## 2024-11-15 RX ADMIN — ENOXAPARIN SODIUM 40 MG: 40 INJECTION SUBCUTANEOUS at 05:11

## 2024-11-15 RX ADMIN — ONDANSETRON 4 MG: 2 INJECTION INTRAMUSCULAR; INTRAVENOUS at 09:11

## 2024-11-15 RX ADMIN — SENNOSIDES AND DOCUSATE SODIUM 1 TABLET: 50; 8.6 TABLET ORAL at 10:11

## 2024-11-15 RX ADMIN — AMLODIPINE BESYLATE 10 MG: 10 TABLET ORAL at 10:11

## 2024-11-15 NOTE — ANESTHESIA POSTPROCEDURE EVALUATION
Anesthesia Post Evaluation    Patient: Yuriy Jerome    Procedure(s) Performed: Procedure(s) (LRB):  THYROIDECTOMY, TOTAL (Bilateral)    Final Anesthesia Type: general      Patient location during evaluation: PACU  Patient participation: Yes- Able to Participate  Level of consciousness: awake and alert  Post-procedure vital signs: reviewed and stable  Pain management: adequate  Airway patency: patent    PONV status at discharge: No PONV  Anesthetic complications: no      Cardiovascular status: hemodynamically stable  Respiratory status: unassisted and spontaneous ventilation  Hydration status: euvolemic  Follow-up not needed.              Vitals Value Taken Time   /73 11/15/24 1206   Temp 36.7 °C (98.1 °F) 11/15/24 0938   Pulse 59 11/15/24 1227   Resp 9 11/15/24 1227   SpO2 93 % 11/15/24 1227   Vitals shown include unfiled device data.      Event Time   Out of Recovery 10:00:00         Pain/Marcela Score: Pain Rating Prior to Med Admin: 5 (11/15/2024 10:50 AM)  Marcela Score: 10 (11/15/2024 10:00 AM)

## 2024-11-15 NOTE — BRIEF OP NOTE
[facility] - Surgery ([facility])  Brief Operative Note    SUMMARY     Surgery Date: 11/15/2024     Surgeons and Role:     * Vlad Avelar MD - Primary     * Jv Greer MD - Resident - Assisting        Pre-op Diagnosis:  Thyroid nodule [E04.1]    Post-op Diagnosis:  Post-Op Diagnosis Codes:     * Thyroid nodule [E04.1]    Procedure(s) (LRB):  THYROIDECTOMY, TOTAL (Bilateral)    Anesthesia: General    Implants:  * No implants in log *    Operative Findings: total thyroid,  right emir x1    Estimated Blood Loss: * No values recorded between 11/15/2024  7:38 AM and 11/15/2024  9:28 AM *    Estimated Blood Loss has not been documented. EBL = 50.         Specimens:   Specimen (24h ago, onward)       Start     Ordered    11/15/24 0911  Specimen to Pathology, Surgery ENT  Once        Comments: Pre-op Diagnosis: Thyroid nodule [E04.1]Procedure(s):THYROIDECTOMY, TOTAL Number of specimens: 2Name of specimens: 1. Right gentry thyroidectomy - Permanent2. Pre tracheal lymph node - Permanent3. Left gentry thyroidectomy - permanent     References:    Click here for ordering Quick Tip   Question Answer Comment   Procedure Type: ENT    Release to patient Immediate        11/15/24 0960                    [id number]

## 2024-11-15 NOTE — ADDENDUM NOTE
Addendum  created 11/15/24 1233 by Salome Krishnan MD    Flowsheet accepted, Intraprocedure Flowsheets edited

## 2024-11-15 NOTE — H&P
CC: Thyroid follow up        HPI   68 y.o. male presents for evaluation of multiple thyroid nodules.  He reports a history of a right-sided thyroid nodule dating back to 2017.  He presents an ultrasound from that time.  In comparing the dimensions of the right-sided thyroid nodule, it appears that it is stable over time.  Recent FNA of isthmus nodule and right thyroid nodule revealed FLUS.  Biopsy of the right thyroid nodule in 2017 was benign.  No compressive symptoms.  No family history.  No radiation exposure.     Recent genetic testing revealed high risk of malignancy in both nodules.     Review of Systems   Constitutional: Negative for fatigue and unexpected weight change.   HENT: Per HPI.  Eyes: Negative for visual disturbance.   Respiratory: Negative for shortness of breath, hemoptysis   Cardiovascular: Negative for chest pain and palpitations.   Musculoskeletal: Negative for decreased ROM, back pain.   Skin: Negative for rash, sunburn, itching.   Neurological: Negative for dizziness and seizures.   Hematological: Negative for adenopathy. Does not bruise/bleed easily.   Endocrine: Negative for rapid weight loss/weight gain, heat/cold intolerance.      Past Medical History   Problem List       Patient Active Problem List   Diagnosis    Obstructive sleep apnea    Insomnia    Hyperlipidemia    Essential hypertension    Benign prostatic hyperplasia    Obesity (BMI 35.0-39.9 without comorbidity)    Type 2 diabetes mellitus with other specified complication, without long-term current use of insulin    Subluxation of tarsal joint of left foot    Morbid (severe) obesity due to excess calories    Stented coronary artery    Thyroid nodule                  Past Surgical History         Past Surgical History:   Procedure Laterality Date    ADENOIDECTOMY   2001     UPPP surgery    APPENDECTOMY   2005    BICEPS TENDON REPAIR Left 12/28/2021    CORONARY ANGIOGRAPHY N/A 6/4/2024     Procedure: ANGIOGRAM, CORONARY ARTERY;   Surgeon: Raji Noble MD;  Location: [facility] CATH LAB/EP;  Service: Cardiology;  Laterality: N/A;    EYE SURGERY   2017     Cararact surgery    FOOT SURGERY Left 05/2017     subtalar implant, 6 weeks later removed due to failure    FOOT SURGERY Left 05/19/2022    HERNIA REPAIR         DOUBLE age 8    IVUS, CORONARY   6/4/2024     Procedure: IVUS, Coronary;  Surgeon: Raji Noble MD;  Location: [facility] CATH LAB/EP;  Service: Cardiology;;    JOINT REPLACEMENT   2009     Bilateral knee replacement    KNEE SURGERY Bilateral 05/2011     TKR, had ligament replacement in right , meniscus repair both knees    LAMINECTOMY   1989     spinal    LEFT HEART CATHETERIZATION Left 6/4/2024     Procedure: Left heart cath;  Surgeon: Raji Noble MD;  Location: [facility] CATH LAB/EP;  Service: Cardiology;  Laterality: Left;    PERCUTANEOUS CORONARY INTERVENTION, ARTERY N/A 6/4/2024     Procedure: Percutaneous coronary intervention;  Surgeon: Raji Noble MD;  Location: [facility] CATH LAB/EP;  Service: Cardiology;  Laterality: N/A;    PTCA, SINGLE VESSEL   6/4/2024     Procedure: PTCA, Single Vessel;  Surgeon: Raji Noble MD;  Location: [facility] CATH LAB/EP;  Service: Cardiology;;    SPINE SURGERY   1991     Triple laminectomy    TONSILLECTOMY   2001     UPPP surgery    UVULOPALATOPHARYNGOPLASTY   2007    VASECTOMY   1985            Family History          Family History   Problem Relation Name Age of Onset    Arthritis Mother Adenike      Asthma Father Pat      Heart disease Father Pat      Cancer Maternal Grandfather Daron      Cancer Maternal Grandmother Erin      Drug abuse Brother Anshul      Drug abuse Brother Thai      Drug abuse Brother Kapil      Drug abuse Brother Mehul                 Social History   .  Social History            Socioeconomic History    Marital status:    Tobacco Use    Smoking status: Never    Smokeless tobacco: Never   Substance and Sexual Activity    Alcohol use: Yes        Alcohol/week: 11.0 standard drinks of alcohol       Types: 7 Glasses of wine, 4 Cans of beer per week    Drug use: No    Sexual activity: Yes       Partners: Female       Birth control/protection: Other-see comments       Comment: Vasectomy      Social Determinants of Health           Financial Resource Strain: Low Risk  (6/11/2024)     Overall Financial Resource Strain (CARDIA)      Difficulty of Paying Living Expenses: Not hard at all   Food Insecurity: No Food Insecurity (6/11/2024)     Hunger Vital Sign      Worried About Running Out of Food in the Last Year: Never true      Ran Out of Food in the Last Year: Never true   Transportation Needs: No Transportation Needs (5/9/2024)     PRAPARE - Transportation      Lack of Transportation (Medical): No      Lack of Transportation (Non-Medical): No   Physical Activity: Sufficiently Active (6/11/2024)     Exercise Vital Sign      Days of Exercise per Week: 3 days      Minutes of Exercise per Session: 60 min   Stress: Stress Concern Present (6/11/2024)     [employer] - Occupational Stress Questionnaire      Feeling of Stress : Very much   Housing Stability: Unknown (6/11/2024)     Housing Stability Vital Sign      Unable to Pay for Housing in the Last Year: No            Allergies        Review of patient's allergies indicates:   Allergen Reactions    Hay fever and allergy relief Other (See Comments)               Physical Exam          Vitals:     08/12/24 1350   BP: 126/79   Pulse: 73            Body mass index is 33.01 kg/m².        General: AOx3, NAD   Respiratory:  Symmetric chest rise, normal effort  Neck: No scars.  No cervical lymphadenopathy, thyromegaly or thyroid nodules.  Normal range of motion.    Face: House Brackmann I bilaterally.      US reviewed.     Assessment/Plan  Problem List Items Addressed This Visit                  Endocrine     Thyroid nodule - Primary       High-risk of malignancy on genetic testing.  Case is  complicated by stent placement in May of this year requiring 12 months of dual antiplatelet therapy.  Will discuss the safe as possible date that he may temporarily come off of these draw this for surgery with his cardiologist and will plan to coordinate TURP under the same anesthetic feasible.  Repeat ultrasound since it has been 4 months since his last study.           Relevant Orders     US Soft Tissue Head Neck     ----      I have seen and examined the patient in the pre-op area. There have been no significant interval changes to the history or physical examination as noted above. Plan is to proceed to the OR for the above stated procedure.

## 2024-11-15 NOTE — TRANSFER OF CARE
Anesthesia Transfer of Care Note    Patient: Yuriy Jerome    Procedure(s) Performed: Procedure(s) (LRB):  THYROIDECTOMY, TOTAL (Bilateral)    Patient location: PACU    Transport from OR: Transported from OR on 6-10 L/min O2 by face mask with adequate spontaneous ventilation    Post pain: pain needs to be addressed    Post assessment: no apparent anesthetic complications    Post vital signs: stable    Level of consciousness: awake    Nausea/Vomiting: no nausea/vomiting    Complications: none    Transfer of care protocol was followed      Last vitals: Visit Vitals  BP (!) 148/75   Pulse 65   Temp 36.7 °C (98.1 °F) (Temporal)   Resp 18   SpO2 97%

## 2024-11-15 NOTE — NURSING TRANSFER
Nursing Transfer Note      11/15/2024   2:47 PM    Nurse giving handoff: GRACE Ward  Nurse receiving handoff: GRACE Clemens    Transfer To: [id number]    Transfer via bed    Transported by Patient transport      Order for Tele Monitor? No    Additional Lines: JOHANN drain    Medication sent: IV Robaxin      Patient belongings transferred with patient: Yes C-pap, belongings bag, back pack    Chart send with patient: Yes    Notified: spouse    Patient reassessed at: 1448

## 2024-11-15 NOTE — OP NOTE
DATE OF PROCEDURE: 11/15/2024     PREOPERATIVE DIAGNOSES:   Thyroid nodule with elevated risk of thyroid malignancy on genetic to    POSTOPERATIVE DIAGNOSES:   Same    SURGEON:  Surgeons and Role:     * Vlad Avelar MD - Primary     * Jv Greer MD - Resident - Assisting      PROCEDURES PERFORMED:   Total thyroidectomy    ANESTHESIA: General      INDICATIONS FOR PROCEDURE:   Yuriy Jerome is a 69 y.o. man who recently evaluated for thyroid nodules.  Fine-needle aspiration revealed atypia.  Genetic testing revealed an increased risk of malignancy.  Given the above, I recommended that we proceed to the operating room for the aforementioned procedures.    He was apprised of the risks, benefits and alternatives to surgery.  In spite of the risk inherent to surgery,he provided informed consent for the aforementioned procedures.     PROCEDURE IN DETAIL:  The patient was taken to the operating room and placed on the operating table in the supine position.  General endotracheal anesthesia was induced by the anesthesia team.  He was intubated with a NIMS endotracheal tube and intraoperative nerve monitoring was employed.    To begin, an approximately 6 cm incision was marked in a natural skin crease 2 fingerbreadths above the sternal notch.  The intended incision was injected with several cc of 1% lidocaine with epinephrine.  The neck was prepped and draped in the standard sterile fashion.      To begin, the skin was incised with a 15 blade.  Sharp dissection proceeded through the underlying subcutaneous tissue and platysma.  Superiorly and inferiorly-based subplatysmal flaps were elevated from the level of the thyroid notch to the level of the sternal notch.  The midline was identified and divided with the Bovie.  The strap musculature was then sharply elevated off the underlying thyroid gland.  The thyroid isthmus was bluntly dissected away from the underlying trachea and divided with the Bovie.  The  right side was addressed 1st.    The cricothyroid space was developed in the superior pole vessels were isolated, clipped and divided.  The thyroid was then rotated from lateral to medial.  The recurrent laryngeal nerve was identified in the right tracheoesophageal groove.  With the nerve in full view, the inferior thyroid pedicle was isolated, clipped and divided.  The inferior parathyroid was identified and preserved.  The nerve was followed up to its entry into the cricothyroid joint.  With the nerve in full view, the remaining soft tissue attachments between the right gentry thyroid and trachea were cauterized and divided.      An identical procedure was carried out on the left side.  The left gentry thyroid was sent to pathology for permanent analysis after it was removed.  Again, the nerve was identified and preserved.  It was a prominent pretracheal lymph node which was removed and sent to pathology for permanent analysis.  There was no ruy evidence of malignancy otherwise.    Hemostasis was then achieved in the wound with electrocautery.  At the conclusion of the procedure, the bilateral recurrent laryngeal nerves were stimulated with 1 milliampere of current with the nerve stimulator was then excellent response on the monitor bilaterally indicating maintenance of the integrity of the nerves.    The wound was then packed with fibrillar and closed over a closed suction drain which was secured with silk suture.  The wound was closed with absorbable suture and Dermabond.      Once the wound was closed, he was handed back to anesthesia.  He was awakened, extubated and transferred to recovery in satisfactory condition.    There were no intraoperative complications.  I was present for and participated in the entire procedure as dictated above.       ESTIMATED BLOOD LOSS: 20 mL    SPECIMENS:   Specimen (24h ago, onward)       Start     Ordered    11/15/24 0977  Specimen to Pathology, Surgery ENT  Once         Comments: Pre-op Diagnosis: Thyroid nodule [E04.1]Procedure(s):THYROIDECTOMY, TOTAL Number of specimens: 2Name of specimens: 1. Right gentry thyroidectomy - Permanent2. Pre tracheal lymph node - Permanent3. Left gentry thyroidectomy - permanent     References:    Click here for ordering Quick Tip   Question Answer Comment   Procedure Type: ENT    Release to patient Immediate        11/15/24 0986

## 2024-11-15 NOTE — ANESTHESIA PROCEDURE NOTES
Intubation    Date/Time: 11/15/2024 7:21 AM    Performed by: Salome Krishnan MD  Authorized by: Salome Krishnan MD    Intubation:     Induction:  Intravenous    Intubated:  Postinduction    Mask Ventilation:  Easy mask    Attempts:  1    Attempted By:  Staff anesthesiologist    Method of Intubation:  Video laryngoscopy    Blade:  Cari 3    Laryngeal View Grade: Grade I - full view of cords      Difficult Airway Encountered?: No      Complications:  None    Airway Device:  EMG ETT (NIMS)    Airway Device Size:  7.0    Style/Cuff Inflation:  Cuffed    Inflation Amount (mL):  8    Tube secured:  22    Secured at:  The lips    Placement Verified By:  Capnometry    Complicating Factors:  None    Findings Post-Intubation:  BS equal bilateral

## 2024-11-16 VITALS
OXYGEN SATURATION: 95 % | RESPIRATION RATE: 18 BRPM | WEIGHT: 240.06 LBS | HEART RATE: 57 BPM | HEIGHT: 73 IN | BODY MASS INDEX: 31.82 KG/M2 | SYSTOLIC BLOOD PRESSURE: 148 MMHG | TEMPERATURE: 98 F | DIASTOLIC BLOOD PRESSURE: 77 MMHG

## 2024-11-16 LAB
ALBUMIN SERPL BCP-MCNC: 4 G/DL (ref 3.5–5.2)
ALP SERPL-CCNC: 53 U/L (ref 40–150)
ALT SERPL W/O P-5'-P-CCNC: 16 U/L (ref 10–44)
ANION GAP SERPL CALC-SCNC: 16 MMOL/L (ref 8–16)
AST SERPL-CCNC: 12 U/L (ref 10–40)
BASOPHILS # BLD AUTO: 0.02 K/UL (ref 0–0.2)
BASOPHILS NFR BLD: 0.2 % (ref 0–1.9)
BILIRUB SERPL-MCNC: 0.7 MG/DL (ref 0.1–1)
BUN SERPL-MCNC: 15 MG/DL (ref 8–23)
CALCIUM SERPL-MCNC: 9.6 MG/DL (ref 8.7–10.5)
CHLORIDE SERPL-SCNC: 99 MMOL/L (ref 95–110)
CO2 SERPL-SCNC: 22 MMOL/L (ref 23–29)
CREAT SERPL-MCNC: 0.9 MG/DL (ref 0.5–1.4)
DIFFERENTIAL METHOD BLD: ABNORMAL
EOSINOPHIL # BLD AUTO: 0 K/UL (ref 0–0.5)
EOSINOPHIL NFR BLD: 0.1 % (ref 0–8)
ERYTHROCYTE [DISTWIDTH] IN BLOOD BY AUTOMATED COUNT: 12.1 % (ref 11.5–14.5)
EST. GFR  (NO RACE VARIABLE): >60 ML/MIN/1.73 M^2
GLUCOSE SERPL-MCNC: 128 MG/DL (ref 70–110)
HCT VFR BLD AUTO: 42.2 % (ref 40–54)
HGB BLD-MCNC: 14.6 G/DL (ref 14–18)
IMM GRANULOCYTES # BLD AUTO: 0.04 K/UL (ref 0–0.04)
IMM GRANULOCYTES NFR BLD AUTO: 0.4 % (ref 0–0.5)
LYMPHOCYTES # BLD AUTO: 0.8 K/UL (ref 1–4.8)
LYMPHOCYTES NFR BLD: 7 % (ref 18–48)
MCH RBC QN AUTO: 31.7 PG (ref 27–31)
MCHC RBC AUTO-ENTMCNC: 34.6 G/DL (ref 32–36)
MCV RBC AUTO: 92 FL (ref 82–98)
MONOCYTES # BLD AUTO: 0.9 K/UL (ref 0.3–1)
MONOCYTES NFR BLD: 8.5 % (ref 4–15)
NEUTROPHILS # BLD AUTO: 9.1 K/UL (ref 1.8–7.7)
NEUTROPHILS NFR BLD: 83.8 % (ref 38–73)
NRBC BLD-RTO: 0 /100 WBC
PLATELET # BLD AUTO: 236 K/UL (ref 150–450)
PMV BLD AUTO: 10.7 FL (ref 9.2–12.9)
POTASSIUM SERPL-SCNC: 3.8 MMOL/L (ref 3.5–5.1)
PROT SERPL-MCNC: 6.7 G/DL (ref 6–8.4)
RBC # BLD AUTO: 4.61 M/UL (ref 4.6–6.2)
SODIUM SERPL-SCNC: 137 MMOL/L (ref 136–145)
WBC # BLD AUTO: 10.86 K/UL (ref 3.9–12.7)

## 2024-11-16 PROCEDURE — 25000003 PHARM REV CODE 250: Performed by: STUDENT IN AN ORGANIZED HEALTH CARE EDUCATION/TRAINING PROGRAM

## 2024-11-16 PROCEDURE — 63600175 PHARM REV CODE 636 W HCPCS: Performed by: STUDENT IN AN ORGANIZED HEALTH CARE EDUCATION/TRAINING PROGRAM

## 2024-11-16 PROCEDURE — 80053 COMPREHEN METABOLIC PANEL: CPT | Performed by: STUDENT IN AN ORGANIZED HEALTH CARE EDUCATION/TRAINING PROGRAM

## 2024-11-16 PROCEDURE — 36415 COLL VENOUS BLD VENIPUNCTURE: CPT | Performed by: STUDENT IN AN ORGANIZED HEALTH CARE EDUCATION/TRAINING PROGRAM

## 2024-11-16 PROCEDURE — 85025 COMPLETE CBC W/AUTO DIFF WBC: CPT | Performed by: STUDENT IN AN ORGANIZED HEALTH CARE EDUCATION/TRAINING PROGRAM

## 2024-11-16 RX ORDER — LEVOTHYROXINE SODIUM 175 UG/1
175 TABLET ORAL
Qty: 30 TABLET | Refills: 11 | Status: SHIPPED | OUTPATIENT
Start: 2024-11-17 | End: 2024-11-19 | Stop reason: SDUPTHER

## 2024-11-16 RX ORDER — ONDANSETRON 4 MG/1
4 TABLET, ORALLY DISINTEGRATING ORAL EVERY 8 HOURS PRN
Qty: 12 TABLET | Refills: 0 | Status: SHIPPED | OUTPATIENT
Start: 2024-11-16

## 2024-11-16 RX ORDER — HYDROCODONE BITARTRATE AND ACETAMINOPHEN 5; 325 MG/1; MG/1
1 TABLET ORAL EVERY 6 HOURS PRN
Qty: 20 TABLET | Refills: 0 | Status: SHIPPED | OUTPATIENT
Start: 2024-11-16 | End: 2024-11-19 | Stop reason: SDUPTHER

## 2024-11-16 RX ORDER — CEPHALEXIN 500 MG/1
500 CAPSULE ORAL EVERY 8 HOURS
Qty: 21 CAPSULE | Refills: 0 | Status: SHIPPED | OUTPATIENT
Start: 2024-11-16 | End: 2024-11-23

## 2024-11-16 RX ORDER — CALCIUM CARBONATE 200(500)MG
TABLET,CHEWABLE ORAL
Qty: 70 TABLET | Refills: 0 | Status: SHIPPED | OUTPATIENT
Start: 2024-11-16 | End: 2024-12-07

## 2024-11-16 RX ADMIN — HYDROCODONE BITARTRATE AND ACETAMINOPHEN 1 TABLET: 5; 325 TABLET ORAL at 01:11

## 2024-11-16 RX ADMIN — LOSARTAN POTASSIUM AND HYDROCHLOROTHIAZIDE 1 TABLET: 100; 12.5 TABLET, FILM COATED ORAL at 10:11

## 2024-11-16 RX ADMIN — SENNOSIDES AND DOCUSATE SODIUM 1 TABLET: 50; 8.6 TABLET ORAL at 10:11

## 2024-11-16 RX ADMIN — LEVOTHYROXINE SODIUM 175 MCG: 125 TABLET ORAL at 05:11

## 2024-11-16 RX ADMIN — CALCIUM CARBONATE (ANTACID) CHEW TAB 500 MG 1000 MG: 500 CHEW TAB at 10:11

## 2024-11-16 RX ADMIN — CEFAZOLIN 2 G: 2 INJECTION, POWDER, FOR SOLUTION INTRAMUSCULAR; INTRAVENOUS at 12:11

## 2024-11-16 RX ADMIN — AMLODIPINE BESYLATE 10 MG: 10 TABLET ORAL at 10:11

## 2024-11-16 NOTE — DISCHARGE SUMMARY
Otorhinolaryngology-Head & Neck Surgery  [facility] - Surgery  Discharge Summary      Patient Name: Yuriy Jerome  MRN: [id number]  Admission Date: 11/15/2024  Discharge Date and Time: No discharge date for patient encounter.  Hospital Length of Stay: 1 days   Attending Physician: Vlad Avelar MD   Discharging Provider: Jv Greer MD      Reason for Hospitalization: Elective inpatient surgery    Diagnoses:   1. Thyroid nodule        Procedure(s):  THYROIDECTOMY, TOTAL    Hospital Course:   Please see the preoperative H&P and other available documentation for full details related to history prior to this admission.  Briefly, Yuriy Jerome was admitted following scheduled elective surgery. Following a complete preoperative discussion of the risks and benefits of surgery with signed informed consent, the patient was taken to the operating room on 11/15/2024 and underwent the above stated procedures.  The patient tolerated surgery well and there were no complications.  Please see the operative report for full intraoperative findings and details.  Postoperatively, the patient did well and was transferred from the PACU to the floor in stable condition where they had a stable and uncomplicated hospital course.  Labs and vital signs remained stable and appropriate throughout course.  Diet was advanced as tolerated and the patient's pain was controlled on oral pain medications without problem.  Currently, the patient is doing well at 1 Day Post-Op and is stable and appropriate for discharge home at this time.      Physical Exam  NCAT, A&Ox3  NAD  Breathing well on RA, no stridor or stertor  Tongue soft and midline, MMM  OP patent  Anterior neck soft, no crepitus - neck incision CDI w skin glue, JOHANN x1 w min ss output  Euphonic      Disposition: Home or Self Care    Discharge Instructions:      Diet Adult Regular     Lifting restrictions     No dressing needed     Notify your health care provider if you experience  any of the following:  temperature >100.4     Notify your health care provider if you experience any of the following:  persistent nausea and vomiting or diarrhea     Notify your health care provider if you experience any of the following:  severe uncontrolled pain     Notify your health care provider if you experience any of the following:  redness, tenderness, or signs of infection (pain, swelling, redness, odor or green/yellow discharge around incision site)     Notify your health care provider if you experience any of the following:  difficulty breathing or increased cough     Notify your health care provider if you experience any of the following:  severe persistent headache     Notify your health care provider if you experience any of the following:  worsening rash     Notify your health care provider if you experience any of the following:  persistent dizziness, light-headedness, or visual disturbances     Notify your health care provider if you experience any of the following:  increased confusion or weakness       Reconciled Medications:      Medication List        START taking these medications      calcium carbonate 200 mg calcium (500 mg) chewable tablet  Commonly known as: TUMS  Take 2 tablets (1,000 mg total) by mouth 2 (two) times daily for 14 days, THEN 2 tablets (1,000 mg total) once daily for 7 days.  Start taking on: November 16, 2024     cephALEXin 500 MG capsule  Commonly known as: KEFLEX  Take 1 capsule (500 mg total) by mouth every 8 (eight) hours. for 7 days     HYDROcodone-acetaminophen 5-325 mg per tablet  Commonly known as: NORCO  Take 1 tablet by mouth every 6 (six) hours as needed for Pain.     levothyroxine 175 MCG tablet  Commonly known as: SYNTHROID, LEVOTHROID  Take 1 tablet (175 mcg total) by mouth before breakfast.  Start taking on: November 17, 2024     ondansetron 4 MG Tbdl  Commonly known as: ZOFRAN-ODT  Take 1 tablet (4 mg total) by mouth every 8 (eight) hours as needed  (nausea).            CONTINUE taking these medications      alpha lipoic acid 600 mg Cap  Take by mouth.     amLODIPine 10 MG tablet  Commonly known as: NORVASC  Take 1 tablet (10 mg total) by mouth once daily.     aspirin 81 MG EC tablet  Commonly known as: ECOTRIN  Take 81 mg by mouth once daily.     blood-glucose meter kit  Commonly known as: ONETOUCH VERIO REFLECT START  Use as instructed     clopidogreL 75 mg tablet  Commonly known as: PLAVIX  Take 1 tablet (75 mg total) by mouth once daily.     coQ10 (ubiquinol) 200 mg Cap     finasteride 5 mg tablet  Commonly known as: PROSCAR  Take 1 tablet (5 mg total) by mouth once daily.     fish oil-omega-3 fatty acids 300-1,000 mg capsule  Take by mouth once daily.     fluticasone propionate 50 mcg/actuation nasal spray  Commonly known as: FLONASE  fluticasone propionate 50 mcg/actuation nasal spray,suspension     lancets 30 gauge Misc  Commonly known as: ONETOUCH DELICA PLUS LANCET  1 lancet  by Misc.(Non-Drug; Combo Route) route 2 (two) times a day.     losartan-hydrochlorothiazide 100-12.5 mg 100-12.5 mg Tab  Commonly known as: HYZAAR  TAKE 1 TABLET BY MOUTH EVERY DAY     metFORMIN 1000 MG tablet  Commonly known as: GLUCOPHAGE  TAKE 1 TABLET(1000 MG) BY MOUTH TWICE DAILY WITH MEALS     mirabegron 50 mg Tb24  Commonly known as: MYRBETRIQ  Take 1 tablet (50 mg total) by mouth once daily.     MULTIVITAMIN 50 PLUS ORAL  Take by mouth.     nitroGLYCERIN 0.4 MG SL tablet  Commonly known as: NITROSTAT  Place 1 tablet (0.4 mg total) under the tongue every 5 (five) minutes as needed for Chest pain.     ONETOUCH VERIO TEST STRIPS Strp  Generic drug: blood sugar diagnostic  USE 1 TEST STRIP TWICE DAILY     rosuvastatin 20 MG tablet  Commonly known as: CRESTOR  TAKE 1 TABLET(20 MG) BY MOUTH EVERY DAY     UNABLE TO FIND  osteobiflex              Follow Up:    Follow-up Information       Henna Ji PA-C Follow up in 1 week(s).    Specialty: Otolaryngology  Contact  information:  [address]  [facility] [address]  [phone]                             Discharged Condition: Good      Jv Greer MD  Otorhinolaryngology-Head & Neck Surgery  [facility]  11/16/2024

## 2024-11-16 NOTE — NURSING
Pt arrived to unit via bed, alert and calm. Wife at bedside. VSS. No c/o of n/v. Pt complained of 7/10 pain. Pain medicine administered. JOHANN drain in place, draining sanguinous fluid. Incision approximated, clean, dry. Pt/wife oriented to room/unit.

## 2024-11-16 NOTE — PLAN OF CARE
Problem: Adult Inpatient Plan of Care  Goal: Plan of Care Review  Outcome: Met  Goal: Patient-Specific Goal (Individualized)  Outcome: Met  Goal: Absence of Hospital-Acquired Illness or Injury  Outcome: Met  Goal: Optimal Comfort and Wellbeing  Outcome: Met  Goal: Readiness for Transition of Care  Outcome: Met     Problem: Diabetes Comorbidity  Goal: Blood Glucose Level Within Targeted Range  Outcome: Met     Problem: Wound  Goal: Optimal Coping  Outcome: Met  Goal: Optimal Functional Ability  Outcome: Met  Goal: Absence of Infection Signs and Symptoms  Outcome: Met  Goal: Improved Oral Intake  Outcome: Met  Goal: Optimal Pain Control and Function  Outcome: Met  Goal: Skin Health and Integrity  Outcome: Met  Goal: Optimal Wound Healing  Outcome: Met      Pt tolerated meds and meals, family at bedside, IV site wnl, incision to neck with dermabond C/D/I , JOHANN drain site C/D/I, JOHANN with 10 mls sanginous drainage prior to D/C , IV dcd intact, D/C instructions given , pt V/U , pt received bedside delivery meds , vss, no distress, wife at bedside, pt dcd via w/c with staff and family

## 2024-11-16 NOTE — PLAN OF CARE
Assessment completed with patient at bedside---patient alert and oriented. Patient denied DME, HH, dialysis and coumadin. Patient takes meds as prescribed and can afford with insurance. Patient wife will transport patient home at discharge.     [facility] - Surgery  Initial Discharge Assessment       Primary Care Provider: Althea Jerome MD    Admission Diagnosis: Thyroid nodule [E04.1]  Thyroid mass [E07.9]    Admission Date: 11/15/2024  Expected Discharge Date: 11/16/2024    Transition of Care Barriers: None    Payor: [facility] MCARE / Plan: [employer] MEDICARE ADVANTAGE / Product Type: Medicare Advantage /     Extended Emergency Contact Information  Primary Emergency Contact: Leigha Jerome  Address: [address] [place] [address] [place] [address] [facility]  Home Phone: [phone]  Work Phone: [phone]  Mobile Phone: [phone]  Relation: Spouse    Discharge Plan A: Home with family  Discharge Plan B: Home with family      [id number] - [place] - [address] AT [facility]  [address] [address]  Phone: [phone] Fax: [phone]      Initial Assessment (most recent)       Adult Discharge Assessment - 11/16/24 0959          Discharge Assessment    Assessment Type Discharge Planning Assessment     Confirmed/corrected address, phone number and insurance Yes     Confirmed Demographics Correct on Facesheet     Source of Information patient     Does patient/caregiver understand observation status Yes     Communicated NAATLIIA with patient/caregiver Yes     People in Home spouse     Do you expect to return to your current living situation? Yes     Do you have help at home or someone to help you manage your care at home? Yes     Who are your caregiver(s) and their phone number(s)? Leigha Jerome (Spouse)  [phone]     Prior to hospitilization cognitive status: Alert/Oriented     Current cognitive  status: Alert/Oriented     Walking or Climbing Stairs Difficulty no     Dressing/Bathing Difficulty no     Equipment Currently Used at Home none     Readmission within 30 days? No     Patient currently being followed by outpatient case management? No     Do you currently have service(s) that help you manage your care at home? No     Do you take prescription medications? Yes     Do you have prescription coverage? Yes     Coverage [facility] Medicare Advantage     Do you have any problems affording any of your prescribed medications? No     Is the patient taking medications as prescribed? yes     Who is going to help you get home at discharge? Leigha Jerome (Spouse)  [phone]     How do you get to doctors appointments? car, drives self     Are you on dialysis? No     Do you take coumadin? No     Discharge Plan A Home with family     Discharge Plan B Home with family     DME Needed Upon Discharge  none     Discharge Plan discussed with: Patient     Transition of Care Barriers None        OTHER    Name(s) of People in Home Leigha Jerome (Spouse)  [phone]

## 2024-11-16 NOTE — PLAN OF CARE
Problem: Adult Inpatient Plan of Care  Goal: Plan of Care Review  Outcome: Progressing  Goal: Patient-Specific Goal (Individualized)  Outcome: Progressing  Goal: Absence of Hospital-Acquired Illness or Injury  Outcome: Progressing  Goal: Optimal Comfort and Wellbeing  Outcome: Progressing  Goal: Readiness for Transition of Care  Outcome: Progressing     Pt is progressing towards plan of care goals. Pt reported generalized discomfort to throat and incisional area. PRN medications given. Pt ambulates in room and to bathroom without difficulty. Pt is tolerating diet without signs/symptoms of N/V. Explained plan of care, pt verbalized understanding. No injury during shift, Side rails up x 2, call light by bedside.

## 2024-11-16 NOTE — PLAN OF CARE
Case Management Final Discharge Note      Discharge Disposition: home with family    New DME ordered / company name: none    Relevant SDOH / Transition of Care Barriers:  none    Primary Caretaker and contact information: self    Scheduled followup appointment: Post Op    Referrals placed: none    Transportation:  Patient wife at bedside to transport patient home at discharge.         Patient and family educated on discharge services and updated on DC plan. Bedside RN notified, patient clear to discharge from Case Management Perspective.       [facility] - [facility]  Discharge Final Note    Primary Care Provider: Althea Jerome MD    Expected Discharge Date: 11/16/2024    Final Discharge Note (most recent)       Final Note - 11/16/24 1037          Final Note    Assessment Type Final Discharge Note     Anticipated Discharge Disposition Home or Self Care     Hospital Resources/Appts/Education Provided Provided patient/caregiver with written discharge plan information;Appointments scheduled and added to AVS        Post-Acute Status    Discharge Delays None known at this time                     Important Message from Medicare             Contact Info       Henna Ji PA-C   Specialty: Otolaryngology    [address]  [facility] [address]   Phone: [phone]       Next Steps: Follow up in 1 week(s)

## 2024-11-16 NOTE — DISCHARGE INSTRUCTIONS
Restart you aspirin and plavix on Sunday  Take your synthroid as directed, on an empty stomach in the morning  Take your medications as prescribed  Return to clinic next week for drain removal. We will call you for an appointment  Return to ED with any swelling to neck or difficulty breathing    DO NOT CALL [facility] ON CALL FOR POSTOPERATIVE PROBLEMS. CALL THE  [phone] AND ASK FOR ENT ON CALL.

## 2024-11-19 ENCOUNTER — OFFICE VISIT (OUTPATIENT)
Dept: OTOLARYNGOLOGY | Facility: CLINIC | Age: 69
End: 2024-11-19
Payer: MEDICARE

## 2024-11-19 DIAGNOSIS — Z98.890 POST-OPERATIVE STATE: ICD-10-CM

## 2024-11-19 DIAGNOSIS — E04.1 THYROID NODULE: ICD-10-CM

## 2024-11-19 DIAGNOSIS — E89.0 S/P TOTAL THYROIDECTOMY: ICD-10-CM

## 2024-11-19 DIAGNOSIS — Z48.89 ENCOUNTER FOR POSTOPERATIVE WOUND CHECK: ICD-10-CM

## 2024-11-19 DIAGNOSIS — Z48.03 ENCOUNTER FOR CHANGE OR REMOVAL OF DRAINS: Primary | ICD-10-CM

## 2024-11-19 PROCEDURE — 3066F NEPHROPATHY DOC TX: CPT | Mod: CPTII,S$GLB,, | Performed by: PHYSICIAN ASSISTANT

## 2024-11-19 PROCEDURE — 99999 PR PBB SHADOW E&M-EST. PATIENT-LVL III: CPT | Mod: PBBFAC,,, | Performed by: PHYSICIAN ASSISTANT

## 2024-11-19 PROCEDURE — 1125F AMNT PAIN NOTED PAIN PRSNT: CPT | Mod: CPTII,S$GLB,, | Performed by: PHYSICIAN ASSISTANT

## 2024-11-19 PROCEDURE — 1159F MED LIST DOCD IN RCRD: CPT | Mod: CPTII,S$GLB,, | Performed by: PHYSICIAN ASSISTANT

## 2024-11-19 PROCEDURE — 99024 POSTOP FOLLOW-UP VISIT: CPT | Mod: S$GLB,,, | Performed by: PHYSICIAN ASSISTANT

## 2024-11-19 PROCEDURE — 3061F NEG MICROALBUMINURIA REV: CPT | Mod: CPTII,S$GLB,, | Performed by: PHYSICIAN ASSISTANT

## 2024-11-19 PROCEDURE — 1160F RVW MEDS BY RX/DR IN RCRD: CPT | Mod: CPTII,S$GLB,, | Performed by: PHYSICIAN ASSISTANT

## 2024-11-19 PROCEDURE — 3044F HG A1C LEVEL LT 7.0%: CPT | Mod: CPTII,S$GLB,, | Performed by: PHYSICIAN ASSISTANT

## 2024-11-19 RX ORDER — HYDROCODONE BITARTRATE AND ACETAMINOPHEN 5; 325 MG/1; MG/1
1 TABLET ORAL EVERY 6 HOURS PRN
Qty: 15 TABLET | Refills: 0 | Status: SHIPPED | OUTPATIENT
Start: 2024-11-19

## 2024-11-19 RX ORDER — LEVOTHYROXINE SODIUM 175 UG/1
175 TABLET ORAL
Qty: 30 TABLET | Refills: 11 | Status: SHIPPED | OUTPATIENT
Start: 2024-11-19 | End: 2025-11-19

## 2024-11-19 NOTE — PROGRESS NOTES
HEAD AND NECK SURGICAL ONCOLOGY CLINIC NOTE    CC: Follow-up for drain removal    TREATMENT HISTORY:  1. THYROIDECTOMY, TOTAL (Bilateral), November 15,  2024  - Dr Avelar    PATHOLOGY in process.    INTERVAL HISTORY:  Yuriy Jerome returns to clinic today without complaints. His voice is normal. He denies perioral or digital paresthesias or dysesthesias. There is no redness or drainage from the wound. He has no fever or chills. He is eating a regular diet.  Denies sore throat, dysphagia, shortness of breath.  Post op pain improving - requesting refill of pain medication. 25cc output in last 24 hours.      PHYSICAL EXAM:  JOHANN drain in place with scant serosanguinous drainage. Dermabond noted to incision. Incision CDI without redness, swelling, drainage, bleeding. Nontender. There is some bruising to the middle aspect of surgical incision. Normal ROM of the neck.      PROCEDURE:  Drain and suture removed without difficulty, band aid placed.       PLAN:  Follow up in clinic for post op appt in 2 weeks for wound check.  Pathology pending.   Pain medication refilled. Synthroid prescription moved to preferred pharmacy.  All questions answered.

## 2024-11-20 ENCOUNTER — TELEPHONE (OUTPATIENT)
Dept: CARDIOLOGY | Facility: CLINIC | Age: 69
End: 2024-11-20
Payer: MEDICARE

## 2024-11-20 ENCOUNTER — PATIENT MESSAGE (OUTPATIENT)
Dept: OTOLARYNGOLOGY | Facility: CLINIC | Age: 69
End: 2024-11-20
Payer: MEDICARE

## 2024-11-20 NOTE — TELEPHONE ENCOUNTER
"Reached out to Mr. Jerome, and left him V/Message ,Letting him   Know that I fax  back "[facility] [place] Form to  [phone]  &  [phone].  Fitness Form was also scanned into EPIC/ MEDIA.    Nw  "

## 2024-11-26 DIAGNOSIS — I10 ESSENTIAL HYPERTENSION: ICD-10-CM

## 2024-11-26 RX ORDER — ROSUVASTATIN CALCIUM 20 MG/1
20 TABLET, COATED ORAL
Qty: 90 TABLET | Refills: 3 | Status: SHIPPED | OUTPATIENT
Start: 2024-11-26

## 2024-11-26 RX ORDER — AMLODIPINE BESYLATE 5 MG/1
5 TABLET ORAL
Qty: 90 TABLET | Refills: 3 | OUTPATIENT
Start: 2024-11-26

## 2024-11-26 NOTE — TELEPHONE ENCOUNTER
No care due was identified.  Health [facility] Embedded Care Due Messages. Reference number: [id number].   11/26/2024 5:39:37 AM CST

## 2024-11-26 NOTE — TELEPHONE ENCOUNTER
Refill Decision Note   Yuriy Jerome  is requesting a refill authorization.  Brief Assessment and Rationale for Refill:  Quick Discontinue  Approve     Medication Therapy Plan:  Norvasc- dosage increased to 10 mg; QDC      Comments:     Note composed:11:05 AM 11/26/2024

## 2024-12-01 DIAGNOSIS — E11.9 CONTROLLED TYPE 2 DIABETES MELLITUS WITHOUT COMPLICATION, WITHOUT LONG-TERM CURRENT USE OF INSULIN: ICD-10-CM

## 2024-12-01 LAB
FINAL PATHOLOGIC DIAGNOSIS: NORMAL
GROSS: NORMAL
Lab: NORMAL
MICROSCOPIC EXAM: NORMAL

## 2024-12-01 NOTE — TELEPHONE ENCOUNTER
No care due was identified.  [facility] Embedded Care Due Messages. Reference number: [id number].   12/01/2024 12:20:07 PM CST

## 2024-12-02 RX ORDER — METFORMIN HYDROCHLORIDE 1000 MG/1
1000 TABLET ORAL 2 TIMES DAILY WITH MEALS
Qty: 180 TABLET | Refills: 1 | Status: SHIPPED | OUTPATIENT
Start: 2024-12-02

## 2024-12-02 NOTE — TELEPHONE ENCOUNTER
Refill Decision Note   Yuriy Jerome  is requesting a refill authorization.  Brief Assessment and Rationale for Refill:  Approve     Medication Therapy Plan:         Comments:     Note composed:7:44 AM 12/02/2024

## 2024-12-03 ENCOUNTER — PATIENT MESSAGE (OUTPATIENT)
Dept: OTOLARYNGOLOGY | Facility: CLINIC | Age: 69
End: 2024-12-03

## 2024-12-03 ENCOUNTER — OFFICE VISIT (OUTPATIENT)
Dept: OTOLARYNGOLOGY | Facility: CLINIC | Age: 69
End: 2024-12-03
Payer: MEDICARE

## 2024-12-03 ENCOUNTER — TELEPHONE (OUTPATIENT)
Dept: OTOLARYNGOLOGY | Facility: CLINIC | Age: 69
End: 2024-12-03
Payer: MEDICARE

## 2024-12-03 VITALS
BODY MASS INDEX: 31.82 KG/M2 | HEART RATE: 67 BPM | WEIGHT: 241.19 LBS | DIASTOLIC BLOOD PRESSURE: 78 MMHG | SYSTOLIC BLOOD PRESSURE: 125 MMHG

## 2024-12-03 DIAGNOSIS — Z48.89 ENCOUNTER FOR POSTOPERATIVE WOUND CHECK: ICD-10-CM

## 2024-12-03 DIAGNOSIS — E89.0 S/P TOTAL THYROIDECTOMY: ICD-10-CM

## 2024-12-03 DIAGNOSIS — C73 PAPILLARY THYROID CARCINOMA: Primary | ICD-10-CM

## 2024-12-03 PROCEDURE — 3078F DIAST BP <80 MM HG: CPT | Mod: CPTII,S$GLB,, | Performed by: PHYSICIAN ASSISTANT

## 2024-12-03 PROCEDURE — 99024 POSTOP FOLLOW-UP VISIT: CPT | Mod: S$GLB,,, | Performed by: PHYSICIAN ASSISTANT

## 2024-12-03 PROCEDURE — 99999 PR PBB SHADOW E&M-EST. PATIENT-LVL V: CPT | Mod: PBBFAC,,, | Performed by: PHYSICIAN ASSISTANT

## 2024-12-03 PROCEDURE — 1160F RVW MEDS BY RX/DR IN RCRD: CPT | Mod: CPTII,S$GLB,, | Performed by: PHYSICIAN ASSISTANT

## 2024-12-03 PROCEDURE — 3044F HG A1C LEVEL LT 7.0%: CPT | Mod: CPTII,S$GLB,, | Performed by: PHYSICIAN ASSISTANT

## 2024-12-03 PROCEDURE — 1159F MED LIST DOCD IN RCRD: CPT | Mod: CPTII,S$GLB,, | Performed by: PHYSICIAN ASSISTANT

## 2024-12-03 PROCEDURE — 3074F SYST BP LT 130 MM HG: CPT | Mod: CPTII,S$GLB,, | Performed by: PHYSICIAN ASSISTANT

## 2024-12-03 PROCEDURE — 3061F NEG MICROALBUMINURIA REV: CPT | Mod: CPTII,S$GLB,, | Performed by: PHYSICIAN ASSISTANT

## 2024-12-03 PROCEDURE — 1126F AMNT PAIN NOTED NONE PRSNT: CPT | Mod: CPTII,S$GLB,, | Performed by: PHYSICIAN ASSISTANT

## 2024-12-03 PROCEDURE — 3066F NEPHROPATHY DOC TX: CPT | Mod: CPTII,S$GLB,, | Performed by: PHYSICIAN ASSISTANT

## 2024-12-03 NOTE — PATIENT INSTRUCTIONS
Apply SPF 50+ sunscreen to the incision site to promote the best cosmetic result - sun will make the scar darker and more visible.    Two weeks following surgery, you can apply ScarAway to the incision site. You can cut this to size, removed it with bathing, wash it and reuse it. See instructions on package.  Remove ScarAway strip at night and apply Aquaphor and gently massage the wound.   If not ScarAway, you can use Mederma or BioCorneum (this one has SPF in it) or other silicone strips designed for scar treatment. The brand does not matter.  You can do this indefinitely or until the scar is satisfactory.

## 2024-12-03 NOTE — PROGRESS NOTES
HEAD AND NECK SURGICAL ONCOLOGY CLINIC NOTE    CC: Follow-up for wound check    TREATMENT HISTORY:  1. THYROIDECTOMY, TOTAL (Bilateral), November 15,  2024  - Dr Avelar     PATHOLOGY:  Final Pathologic Diagnosis Part 1  Thyroid, right, hemithyroidectomy:  Papillary thyroid carcinoma, infiltrative follicular and classic subtype, multifocal  Pathologic stage classification: [id number]  See synoptic report    Part 2  Lymph node, pretracheal, excision:  Benign simple cyst, negative for malignancy    Part 3  Thyroid, left, hemithyroidectomy:  Papillary thyroid carcinoma, infiltrative follicular, multifocal  Pathologic stage classification: [id number]  See synoptic report       INTERVAL HISTORY:  Yuriy Jerome returns to clinic today for follow up. He feels that his voice has not returned to baseline. He denies perioral or digital paresthesias or dysesthesias. There is no redness or drainage from the wound. He has no fever or chills. He is eating a regular diet.  Denies sore throat, dysphagia, shortness of breath.  Still has some soreness over the surgical site.     PHYSICAL EXAM:  Well healed surgical incision - CDI without redness, swelling, drainage, bleeding. Nontender. Normal ROM of the neck.      PLAN:  Pathology discussed. Referral to endocrinology for BELCHER placed.   Scar care discussed.   All questions answered.   Discussed with and examined by Dr Avelar.

## 2024-12-09 ENCOUNTER — PATIENT MESSAGE (OUTPATIENT)
Dept: SURGERY | Facility: CLINIC | Age: 69
End: 2024-12-09
Payer: MEDICARE

## 2024-12-09 DIAGNOSIS — C73 PAPILLARY CARCINOMA OF THYROID: ICD-10-CM

## 2024-12-09 DIAGNOSIS — C73 MALIGNANT NEOPLASM OF THYROID GLAND: Primary | ICD-10-CM

## 2024-12-13 ENCOUNTER — LAB VISIT (OUTPATIENT)
Dept: LAB | Facility: HOSPITAL | Age: 69
End: 2024-12-13
Attending: STUDENT IN AN ORGANIZED HEALTH CARE EDUCATION/TRAINING PROGRAM
Payer: MEDICARE

## 2024-12-13 ENCOUNTER — OFFICE VISIT (OUTPATIENT)
Dept: ENDOCRINOLOGY | Facility: CLINIC | Age: 69
End: 2024-12-13
Payer: MEDICARE

## 2024-12-13 VITALS — WEIGHT: 241.88 LBS | BODY MASS INDEX: 32.06 KG/M2 | HEIGHT: 73 IN

## 2024-12-13 DIAGNOSIS — C73 PAPILLARY CARCINOMA OF THYROID: ICD-10-CM

## 2024-12-13 DIAGNOSIS — C73 PAPILLARY THYROID CARCINOMA: ICD-10-CM

## 2024-12-13 DIAGNOSIS — E11.69 TYPE 2 DIABETES MELLITUS WITH OTHER SPECIFIED COMPLICATION, WITHOUT LONG-TERM CURRENT USE OF INSULIN: ICD-10-CM

## 2024-12-13 DIAGNOSIS — E89.0 POSTOPERATIVE HYPOTHYROIDISM: Primary | ICD-10-CM

## 2024-12-13 LAB
ALBUMIN SERPL BCP-MCNC: 4.1 G/DL (ref 3.5–5.2)
ANION GAP SERPL CALC-SCNC: 12 MMOL/L (ref 8–16)
BUN SERPL-MCNC: 20 MG/DL (ref 8–23)
CALCIUM SERPL-MCNC: 9.5 MG/DL (ref 8.7–10.5)
CHLORIDE SERPL-SCNC: 106 MMOL/L (ref 95–110)
CO2 SERPL-SCNC: 23 MMOL/L (ref 23–29)
CREAT SERPL-MCNC: 0.9 MG/DL (ref 0.5–1.4)
EST. GFR  (NO RACE VARIABLE): >60 ML/MIN/1.73 M^2
GLUCOSE SERPL-MCNC: 115 MG/DL (ref 70–110)
PHOSPHATE SERPL-MCNC: 3.9 MG/DL (ref 2.7–4.5)
POTASSIUM SERPL-SCNC: 4 MMOL/L (ref 3.5–5.1)
SODIUM SERPL-SCNC: 141 MMOL/L (ref 136–145)
T4 FREE SERPL-MCNC: 1.19 NG/DL (ref 0.71–1.51)
TSH SERPL DL<=0.005 MIU/L-ACNC: 0.04 UIU/ML (ref 0.4–4)

## 2024-12-13 PROCEDURE — 84439 ASSAY OF FREE THYROXINE: CPT | Performed by: STUDENT IN AN ORGANIZED HEALTH CARE EDUCATION/TRAINING PROGRAM

## 2024-12-13 PROCEDURE — 36415 COLL VENOUS BLD VENIPUNCTURE: CPT | Performed by: STUDENT IN AN ORGANIZED HEALTH CARE EDUCATION/TRAINING PROGRAM

## 2024-12-13 PROCEDURE — 86800 THYROGLOBULIN ANTIBODY: CPT | Performed by: STUDENT IN AN ORGANIZED HEALTH CARE EDUCATION/TRAINING PROGRAM

## 2024-12-13 PROCEDURE — 99999 PR PBB SHADOW E&M-EST. PATIENT-LVL IV: CPT | Mod: PBBFAC,,, | Performed by: STUDENT IN AN ORGANIZED HEALTH CARE EDUCATION/TRAINING PROGRAM

## 2024-12-13 PROCEDURE — 84443 ASSAY THYROID STIM HORMONE: CPT | Performed by: STUDENT IN AN ORGANIZED HEALTH CARE EDUCATION/TRAINING PROGRAM

## 2024-12-13 PROCEDURE — 80069 RENAL FUNCTION PANEL: CPT | Performed by: STUDENT IN AN ORGANIZED HEALTH CARE EDUCATION/TRAINING PROGRAM

## 2024-12-13 NOTE — ASSESSMENT & PLAN NOTE
Multifocal PTC (4 foci per pathology report) with largest tumor 3.3 cm  No angioinvasion or lymphatic invasion but really close to margins (<1 mm)  As par patient, some tissue was left to preserve recurrent laryngeal nerve function although this was not specified on operative not  He has molecular markers positive for BRAF V600E and NRAS Q61R    Given above, we are leaning towards BELCHER therapy but he has not had a TSH or Tg checked post op so will wait for that first

## 2024-12-13 NOTE — PROGRESS NOTES
Subjective:      Patient ID: Yuriy Jerome is a 69 y.o. male.    Chief Complaint:  Thyroid cancer    History of Present Illness  This is a 69 y.o. male. with a past medical history of Type 2 diabetes mellitus, CAD s/p PCI, HTN, BPH, here for evaluation of PTC s/p total thyroidectomy.      Papillary thyroid carcinoma  Postoperative hypothyroidism  First noted to have thyroid nodules in 2017, reports FNA at that time 'indeterminate'    No family history of thyroid cancer or head/neck irradiation. No hypothyroidism or Hashimoto's prior to thyroid surgery.      Thyroid FNA dated: 5/21/24  1. Thyroid nodule, right, fine needle aspiration:   [facility] System Thyroid Cytology Category: Atypia Undetermined Significance (AUS)     2. Thyroid nodule, isthmus, fine needle aspiration:   Gilson System Thyroid Cytology Category: Atypia Undetermined Significance (AUS)       S/p total thyroidectomy with or without CN neck dissection:    Pathology (11/15/24)  Procedure: total thyroidectomy  Tumor focality:  Multifocal  Tumor site(s):  Right lobe  Tumor size(s):  3.3 x 2.6 x 2.4 cm; 2.8 x 1.5 x 1.0 cm;  Histologic type(s): papillary thyroid carcinoma, infiltrative follicular (largest lesion); PTC classic subtype    Margins:    -Distance of invasive carcinoma from closest margin (of largest lesion):  Less than 1 mm from anterior and posterior capsular surfaces  Angioinvasion:  Not identified  Lymphatic invasion:  Not identified  Perineural invasion:  Not identified  Mitotic rate:  Less than 1 per 2 mm2  Extrathyroidal extension:  Not identified  Regional lymph nodes:  No lymph nodes submitted or found  Pathologic stage classification: [id number]  Ancillary studies:  None    Synoptic report for left hemithyroidectomy:  Procedure:  Left hemithyroidectomy  Tumor focality:  Multifocal  Tumor site(s):  Left lobe  Tumor size(s):  1.0 x 0.6 x 0.6 cm; 0.6 x 0.5 x 0.4 cm  Histologic type:  Papillary thyroid carcinoma, infiltrative  "follicular  Margins:    -Distance of invasive carcinoma from closest margin:  Less than 1 mm from anterior capsular surface (first lesion)  Angioinvasion:  Not identified  Lymphatic invasion:  Not identified  Perineural invasion:  Not identified  Mitotic rate:  Less than 1 per 2 mm2  Extrathyroidal extension:  Not identified  Regional lymph nodes:  No lymph nodes submitted or found    Pathologic stage classification: [id number]  Ancillary studies:  None       No BELCHER or post-treatment WBS     No post op thyroglobulin level:    Currently on levothyroxine 175 mcg daily  Feels the same overall, some fatigue but reports has sleep issues      Review of Systems  As above    Social and family history reviewed  Current medications and allergies reviewed    Objective:   Ht 6' 1" (1.854 m)   Wt 109.7 kg (241 lb 14.4 oz)   BMI 31.91 kg/m²   Physical Exam  Alert, oriented    BP Readings from Last 1 Encounters:   12/03/24 125/78      Wt Readings from Last 1 Encounters:   12/13/24 1414 109.7 kg (241 lb 14.4 oz)     Body mass index is 31.91 kg/m².    Lab Review:   Lab Results   Component Value Date    HGBA1C 6.0 (H) 11/05/2024     Lab Results   Component Value Date    CHOL 113 (L) 11/05/2024    HDL 40 11/05/2024    LDLCALC 50.8 (L) 11/05/2024    TRIG 111 11/05/2024    CHOLHDL 35.4 11/05/2024     Lab Results   Component Value Date     11/16/2024    K 3.8 11/16/2024    CL 99 11/16/2024    CO2 22 (L) 11/16/2024     (H) 11/16/2024    BUN 15 11/16/2024    CREATININE 0.9 11/16/2024    CALCIUM 9.6 11/16/2024    PROT 6.7 11/16/2024    ALBUMIN 4.0 11/16/2024    BILITOT 0.7 11/16/2024    ALKPHOS 53 11/16/2024    AST 12 11/16/2024    ALT 16 11/16/2024    ANIONGAP 16 11/16/2024    ESTGFRAFRICA >60.0 04/13/2022    EGFRNONAA >60.0 04/13/2022    TSH 1.124 04/08/2024       All pertinent labs reviewed    Assessment and Plan     Papillary thyroid carcinoma  Multifocal PTC (4 foci per pathology report) with largest tumor 3.3 cm  No " angioinvasion or lymphatic invasion but really close to margins (<1 mm)  As par patient, some tissue was left to preserve recurrent laryngeal nerve function although this was not specified on operative not  He has molecular markers positive for BRAF V600E and NRAS Q61R    Given above, we are leaning towards BELCHER therapy but he has not had a TSH or Tg checked post op so will wait for that first    Postoperative hypothyroidism  Will aim for TSH goal around 0.4 for now  Will check TSH and adjust levothyroxine as needed    Type 2 diabetes mellitus with other specified complication, without long-term current use of insulin  A1c controlled on metformin        Suraj Bridges MD  Endocrinology    61 minutes of total time spent on the encounter, which includes face to face time and non-face to face time preparing to see the patient (e.g., review of tests), obtaining and/or reviewing separately obtained history, documenting clinical information in the electronic or other health record, independently interpreting results (not separately reported) and communicating results to the patient/family/caregiver, or care coordination (not separately reported).

## 2024-12-15 ENCOUNTER — PATIENT MESSAGE (OUTPATIENT)
Dept: ENDOCRINOLOGY | Facility: CLINIC | Age: 69
End: 2024-12-15
Payer: MEDICARE

## 2024-12-16 DIAGNOSIS — E89.0 POSTOPERATIVE HYPOTHYROIDISM: ICD-10-CM

## 2024-12-16 DIAGNOSIS — C73 PAPILLARY THYROID CARCINOMA: Primary | ICD-10-CM

## 2024-12-16 LAB
THRYOGLOBULIN INTERPRETATION: ABNORMAL
THYROGLOB AB SERPL-ACNC: <1.8 IU/ML
THYROGLOB SERPL-MCNC: 2.8 NG/ML

## 2024-12-16 RX ORDER — LEVOTHYROXINE SODIUM 150 UG/1
150 TABLET ORAL
Qty: 30 TABLET | Refills: 11 | Status: SHIPPED | OUTPATIENT
Start: 2024-12-16 | End: 2025-12-16

## 2025-01-19 ENCOUNTER — PATIENT MESSAGE (OUTPATIENT)
Dept: ENDOCRINOLOGY | Facility: CLINIC | Age: 70
End: 2025-01-19
Payer: MEDICARE

## 2025-01-27 ENCOUNTER — CLINICAL SUPPORT (OUTPATIENT)
Dept: ENDOCRINOLOGY | Facility: CLINIC | Age: 70
End: 2025-01-27
Payer: MEDICARE

## 2025-01-27 DIAGNOSIS — C73 PAPILLARY THYROID CARCINOMA: Primary | ICD-10-CM

## 2025-01-27 NOTE — PROGRESS NOTES
Patient arrives in clinic for Thyrogen injection per Dr. Bridges.  Administered 0.9 mg of Thyrogen to right dorsal gluteal using 25 gauge.  Patient tolerated well.

## 2025-01-28 ENCOUNTER — CLINICAL SUPPORT (OUTPATIENT)
Dept: ENDOCRINOLOGY | Facility: CLINIC | Age: 70
End: 2025-01-28
Payer: MEDICARE

## 2025-01-28 ENCOUNTER — LAB VISIT (OUTPATIENT)
Dept: LAB | Facility: HOSPITAL | Age: 70
End: 2025-01-28
Attending: STUDENT IN AN ORGANIZED HEALTH CARE EDUCATION/TRAINING PROGRAM
Payer: MEDICARE

## 2025-01-28 DIAGNOSIS — C73 PAPILLARY CARCINOMA OF THYROID: ICD-10-CM

## 2025-01-28 DIAGNOSIS — C73 PAPILLARY THYROID CARCINOMA: Primary | ICD-10-CM

## 2025-01-28 LAB
T4 FREE SERPL-MCNC: 1.24 NG/DL (ref 0.71–1.51)
TSH SERPL DL<=0.005 MIU/L-ACNC: 28.27 UIU/ML (ref 0.4–4)

## 2025-01-28 PROCEDURE — 84443 ASSAY THYROID STIM HORMONE: CPT | Performed by: STUDENT IN AN ORGANIZED HEALTH CARE EDUCATION/TRAINING PROGRAM

## 2025-01-28 PROCEDURE — 84432 ASSAY OF THYROGLOBULIN: CPT | Performed by: STUDENT IN AN ORGANIZED HEALTH CARE EDUCATION/TRAINING PROGRAM

## 2025-01-28 PROCEDURE — 84439 ASSAY OF FREE THYROXINE: CPT | Performed by: STUDENT IN AN ORGANIZED HEALTH CARE EDUCATION/TRAINING PROGRAM

## 2025-01-28 NOTE — PROGRESS NOTES
Patient arrives in clinic for Thyrogen injection per Dr. Bridges.  Administered 0.9 mg of Thyrogen to left dorsal using 25 gauge, patient tolerated well.

## 2025-01-29 ENCOUNTER — HOSPITAL ENCOUNTER (OUTPATIENT)
Dept: RADIOLOGY | Facility: HOSPITAL | Age: 70
Discharge: HOME | End: 2025-01-29
Attending: STUDENT IN AN ORGANIZED HEALTH CARE EDUCATION/TRAINING PROGRAM
Payer: MEDICARE

## 2025-01-29 DIAGNOSIS — C73 PAPILLARY CARCINOMA OF THYROID: ICD-10-CM

## 2025-01-29 PROCEDURE — 79005 NUCLEAR RX ORAL ADMIN: CPT | Mod: 26,,, | Performed by: NUCLEAR MEDICINE

## 2025-01-29 PROCEDURE — 79005 NUCLEAR RX ORAL ADMIN: CPT | Mod: TC

## 2025-01-29 PROCEDURE — A9530 I131 IODIDE SOL, RX: HCPCS | Mod: TB | Performed by: STUDENT IN AN ORGANIZED HEALTH CARE EDUCATION/TRAINING PROGRAM

## 2025-01-29 RX ADMIN — SODIUM IODIDE I 131 105.3 MILLICURIE: 1 SOLUTION ORAL at 03:01

## 2025-01-30 LAB
THRYOGLOBULIN INTERPRETATION: ABNORMAL
THYROGLOB AB SERPL-ACNC: <1.8 IU/ML
THYROGLOB SERPL-MCNC: 0.8 NG/ML

## 2025-01-31 ENCOUNTER — PATIENT MESSAGE (OUTPATIENT)
Dept: ENDOCRINOLOGY | Facility: CLINIC | Age: 70
End: 2025-01-31
Payer: MEDICARE

## 2025-02-05 ENCOUNTER — HOSPITAL ENCOUNTER (OUTPATIENT)
Dept: RADIOLOGY | Facility: HOSPITAL | Age: 70
Discharge: HOME | End: 2025-02-05
Attending: STUDENT IN AN ORGANIZED HEALTH CARE EDUCATION/TRAINING PROGRAM
Payer: MEDICARE

## 2025-02-05 DIAGNOSIS — C73 PAPILLARY CARCINOMA OF THYROID: ICD-10-CM

## 2025-02-05 PROCEDURE — 78018 THYROID MET IMAGING BODY: CPT | Mod: TC

## 2025-02-05 PROCEDURE — 78018 THYROID MET IMAGING BODY: CPT | Mod: 26,,, | Performed by: NUCLEAR MEDICINE

## 2025-02-06 ENCOUNTER — PATIENT MESSAGE (OUTPATIENT)
Dept: ENDOCRINOLOGY | Facility: CLINIC | Age: 70
End: 2025-02-06
Payer: MEDICARE

## 2025-02-06 ENCOUNTER — TELEPHONE (OUTPATIENT)
Dept: ENDOCRINOLOGY | Facility: CLINIC | Age: 70
End: 2025-02-06
Payer: MEDICARE

## 2025-02-06 DIAGNOSIS — C73 PAPILLARY THYROID CARCINOMA: Primary | ICD-10-CM

## 2025-02-07 ENCOUNTER — TELEPHONE (OUTPATIENT)
Dept: ENDOCRINOLOGY | Facility: CLINIC | Age: 70
End: 2025-02-07
Payer: MEDICARE

## 2025-02-27 ENCOUNTER — OFFICE VISIT (OUTPATIENT)
Dept: SLEEP MEDICINE | Facility: CLINIC | Age: 70
End: 2025-02-27
Attending: PSYCHIATRY & NEUROLOGY
Payer: MEDICARE

## 2025-02-27 VITALS
HEART RATE: 65 BPM | SYSTOLIC BLOOD PRESSURE: 140 MMHG | WEIGHT: 239.38 LBS | BODY MASS INDEX: 31.59 KG/M2 | DIASTOLIC BLOOD PRESSURE: 81 MMHG

## 2025-02-27 DIAGNOSIS — G47.33 OBSTRUCTIVE SLEEP APNEA: Primary | ICD-10-CM

## 2025-02-27 DIAGNOSIS — I25.118 ATHEROSCLEROSIS OF NATIVE CORONARY ARTERY OF NATIVE HEART WITH STABLE ANGINA PECTORIS: ICD-10-CM

## 2025-02-27 PROCEDURE — 3077F SYST BP >= 140 MM HG: CPT | Mod: CPTII,S$GLB,, | Performed by: NURSE PRACTITIONER

## 2025-02-27 PROCEDURE — 3008F BODY MASS INDEX DOCD: CPT | Mod: CPTII,S$GLB,, | Performed by: NURSE PRACTITIONER

## 2025-02-27 PROCEDURE — 1125F AMNT PAIN NOTED PAIN PRSNT: CPT | Mod: CPTII,S$GLB,, | Performed by: NURSE PRACTITIONER

## 2025-02-27 PROCEDURE — 1101F PT FALLS ASSESS-DOCD LE1/YR: CPT | Mod: CPTII,S$GLB,, | Performed by: NURSE PRACTITIONER

## 2025-02-27 PROCEDURE — 3288F FALL RISK ASSESSMENT DOCD: CPT | Mod: CPTII,S$GLB,, | Performed by: NURSE PRACTITIONER

## 2025-02-27 PROCEDURE — 1159F MED LIST DOCD IN RCRD: CPT | Mod: CPTII,S$GLB,, | Performed by: NURSE PRACTITIONER

## 2025-02-27 PROCEDURE — 3079F DIAST BP 80-89 MM HG: CPT | Mod: CPTII,S$GLB,, | Performed by: NURSE PRACTITIONER

## 2025-02-27 PROCEDURE — 99999 PR PBB SHADOW E&M-EST. PATIENT-LVL III: CPT | Mod: PBBFAC,,, | Performed by: NURSE PRACTITIONER

## 2025-02-27 PROCEDURE — 99214 OFFICE O/P EST MOD 30 MIN: CPT | Mod: S$GLB,,, | Performed by: NURSE PRACTITIONER

## 2025-02-27 NOTE — PROGRESS NOTES
Cc: LACHO    He continues to sleep qhs with apap 12-16cm. Can't sleep w/o it. Still mouth opening and wife hears noises and his lips flubber/air escaping even with use of chin strap. Using nose mask. Has tried FFM in past which would be ideal for him but they always leaked. Still interested in Inspire, especially since he's lost weight. Traveling often with machine is also cumbersome and finding distillled water. He attributes ongoing sinusitis with use of cpap.   CAD, had stent LAD since seen and thyroidectomy      Interrogation DS1: AHI 3.4, 30davg 7.3hr/night. mask fit 53%. 0% PB, 90% tile 12.5cm    BASELINE SLEEP STUDY 2004 RDI 76.8; O2 lizett 86%, titrated to 12cm. Wt 224.6 lbs     Hx  UPPP, septoplasty, sinus surgery--no requal study after surgery        IMPRESSION:   1. LACHO.  Continued adherence, AHI<5, benefits from therapy, mouth opening/noises bothersome and traveling with machine cumbersome and contributing to chronic sinusitis, interested in alternative treatment option Inspire      PLAN:  1.Continue Auto CPAP 12-16, [facility] D[facility]  2 Discussed effectiveness of therapy  3. See pcp and cards as advised/continue meds   REQUAL PSG/assess AHI. SPLIT PSG (assess mask fit/etc..) and also refer to Dr Lane (ensure had study resulted before this appt) to discuss Inspire. Discussed CPAP is gold standard and post treatment AHI often residual mild LACHO, sometimes <5

## 2025-03-13 NOTE — PROGRESS NOTES
Pharmacy Medication Reconciliation      ~Medication reconciliation updated and complete per patient   ~Allergies have been verified and updated     ~Is dispense history available: yes   ~Patient home pharmacy :  [facility] [phone]      ~Anticoagulants (rivaroxaban, apixaban, edoxaban, dabigatran, warfarin, enoxaparin) taken in the last 14 days? No      Patient is on Doxy 100mg QD for acne last dose 2/27/25    Patient had a 5 day course of Nitrofurantoin 100mg BID started 2/11/25 completed 2/15/25            This 64 y.o. male returns today for management of obstructive sleep apnea and difficulty with insomnia. Annual visit. He continues to use aPAP therapy nightly. Having rainout despite hose snuggie. Keeps it cold in room. Wants new machine, his is turning off and on, unreliable. Now retired. Sleep is consolidated. Denies recurrent snoring. Continued improvement of symptoms overall. Getting regular supplies     Sleep initiation difficulty due to ongoing work stress, rumination; persistent thoughts, mild anxiousness  Stopped ambien, trazadone typically 1 50mg helps sleep onset--only takes prn now     Interrogation- AHI 1.5, 0% periodic, avg 7:36h/n. dreamwear mask. 30/30d>4h. 90% tile 13cm    BASELINE SLEEP STUDY 2004 RDI 76.8; O2 lizett 86%, titrated to 12cm. Wt 224.6 lbs     Hx  UPPP, septoplasty, sinus surgery    ROS: In addition to sleep symptoms,otherwise a balance review of systems is negative.         PHYSICAL EXAM:   W,D, obese, well groomed  BP (!) 141/64   Pulse 72   Ht 6' (1.829 m)   Wt 117 kg (258 lb)   BMI 34.99 kg/m²         IMPRESSION:   1. LACHO.  Continued adherence, AHI<5    Medical co-morbidities: Obesity, HTN, allergic rhinitis     2. Insomnia NEC. Underlying stress-induced night time rumination - managed with trazadone    PLAN:  1.Continue Auto CPAP 12-18 until GET new machine apap 12-16 and rtc b/t [id number] fter setup adherence  [facility] D[facility]. Continue nightly use. Get climate hose  2. continue Trazadone 50mg 1-2 qhs PRN  3 Discussed effectiveness of therapy, and potential ramifications of untreated LACHO, including heart disease, hypertension, cognitive difficulties, stroke, and diabetes

## 2025-03-17 ENCOUNTER — HOSPITAL ENCOUNTER (OUTPATIENT)
Dept: SLEEP MEDICINE | Facility: HOSPITAL | Age: 70
Discharge: HOME | End: 2025-03-17
Attending: STUDENT IN AN ORGANIZED HEALTH CARE EDUCATION/TRAINING PROGRAM
Payer: MEDICARE

## 2025-03-17 ENCOUNTER — TELEPHONE (OUTPATIENT)
Dept: SLEEP MEDICINE | Facility: OTHER | Age: 70
End: 2025-03-17
Payer: MEDICARE

## 2025-03-17 DIAGNOSIS — G47.33 OBSTRUCTIVE SLEEP APNEA: ICD-10-CM

## 2025-03-17 PROCEDURE — 95811 POLYSOM 6/>YRS CPAP 4/> PARM: CPT

## 2025-03-18 NOTE — PROGRESS NOTES
Patient was educated about the purpose of the wires and what data was being collected. Patient was informed that the technician may need to enter the room during the night to fix leads or make adjustments to the equipment.         Medium Ld FFM used        Follow up instructions were provided to the patient.

## 2025-03-26 ENCOUNTER — RESULTS FOLLOW-UP (OUTPATIENT)
Dept: SLEEP MEDICINE | Facility: CLINIC | Age: 70
End: 2025-03-26

## 2025-03-27 ENCOUNTER — TELEPHONE (OUTPATIENT)
Dept: CARDIAC REHAB | Facility: CLINIC | Age: 70
End: 2025-03-27
Payer: MEDICARE

## 2025-03-27 ENCOUNTER — PATIENT MESSAGE (OUTPATIENT)
Dept: CARDIAC REHAB | Facility: CLINIC | Age: 70
End: 2025-03-27
Payer: MEDICARE

## 2025-03-27 DIAGNOSIS — I25.10 ATHEROSCLEROSIS OF NATIVE CORONARY ARTERY OF NATIVE HEART WITHOUT ANGINA PECTORIS: Primary | ICD-10-CM

## 2025-03-27 DIAGNOSIS — Z95.5 STENTED CORONARY ARTERY: ICD-10-CM

## 2025-03-27 DIAGNOSIS — G47.33 OSA (OBSTRUCTIVE SLEEP APNEA): Primary | ICD-10-CM

## 2025-04-02 DIAGNOSIS — G47.33 OSA (OBSTRUCTIVE SLEEP APNEA): Primary | ICD-10-CM

## 2025-04-03 ENCOUNTER — OFFICE VISIT (OUTPATIENT)
Dept: CARDIOLOGY | Facility: CLINIC | Age: 70
End: 2025-04-03
Payer: MEDICARE

## 2025-04-03 VITALS
SYSTOLIC BLOOD PRESSURE: 119 MMHG | OXYGEN SATURATION: 96 % | WEIGHT: 239.44 LBS | HEART RATE: 66 BPM | DIASTOLIC BLOOD PRESSURE: 69 MMHG | BODY MASS INDEX: 31.73 KG/M2 | HEIGHT: 73 IN

## 2025-04-03 DIAGNOSIS — I10 ESSENTIAL HYPERTENSION: ICD-10-CM

## 2025-04-03 DIAGNOSIS — E66.01 MORBID (SEVERE) OBESITY DUE TO EXCESS CALORIES: ICD-10-CM

## 2025-04-03 DIAGNOSIS — G47.33 OBSTRUCTIVE SLEEP APNEA: ICD-10-CM

## 2025-04-03 DIAGNOSIS — I25.118 ATHEROSCLEROSIS OF NATIVE CORONARY ARTERY OF NATIVE HEART WITH STABLE ANGINA PECTORIS: ICD-10-CM

## 2025-04-03 DIAGNOSIS — E78.2 MIXED HYPERLIPIDEMIA: Primary | ICD-10-CM

## 2025-04-03 DIAGNOSIS — Z95.5 STENTED CORONARY ARTERY: ICD-10-CM

## 2025-04-03 PROCEDURE — G2211 COMPLEX E/M VISIT ADD ON: HCPCS | Mod: S$GLB,,, | Performed by: INTERNAL MEDICINE

## 2025-04-03 PROCEDURE — 99999 PR PBB SHADOW E&M-EST. PATIENT-LVL IV: CPT | Mod: PBBFAC,,, | Performed by: INTERNAL MEDICINE

## 2025-04-03 PROCEDURE — 3078F DIAST BP <80 MM HG: CPT | Mod: CPTII,S$GLB,, | Performed by: INTERNAL MEDICINE

## 2025-04-03 PROCEDURE — 3074F SYST BP LT 130 MM HG: CPT | Mod: CPTII,S$GLB,, | Performed by: INTERNAL MEDICINE

## 2025-04-03 PROCEDURE — 1101F PT FALLS ASSESS-DOCD LE1/YR: CPT | Mod: CPTII,S$GLB,, | Performed by: INTERNAL MEDICINE

## 2025-04-03 PROCEDURE — 99214 OFFICE O/P EST MOD 30 MIN: CPT | Mod: S$GLB,,, | Performed by: INTERNAL MEDICINE

## 2025-04-03 PROCEDURE — 3008F BODY MASS INDEX DOCD: CPT | Mod: CPTII,S$GLB,, | Performed by: INTERNAL MEDICINE

## 2025-04-03 PROCEDURE — 1126F AMNT PAIN NOTED NONE PRSNT: CPT | Mod: CPTII,S$GLB,, | Performed by: INTERNAL MEDICINE

## 2025-04-03 PROCEDURE — 3288F FALL RISK ASSESSMENT DOCD: CPT | Mod: CPTII,S$GLB,, | Performed by: INTERNAL MEDICINE

## 2025-04-03 PROCEDURE — 1159F MED LIST DOCD IN RCRD: CPT | Mod: CPTII,S$GLB,, | Performed by: INTERNAL MEDICINE

## 2025-04-03 NOTE — PROGRESS NOTES
Subjective:   @Patient ID:  Yuriy Jerome is a 69 y.o. male who presents for evaluation of chest pain      HPI:   April 2025: F/U. He has been doing well. He was very happy with cardiac rehab. Recently he torn his biceps and getting PT. post thyroidectomy thyroid CA. patent getting prostate surgery.  Overall doing well from cardiac standpoint.  Lipids and hypertension well-controlled.    October 2024:  Follow up.  He is in cardiac rehab.  No recurrent exertional angina.  He is doing well.  BP  is well-controlled at home.  No significant palpitation.  Wearing 30 days event monitor due to concerns about 1 episode of tachycardia during cardiac rehab.  Pending thyroidectomy.     June 12, 2024:  follow up.  Stress test abnormal for ischemia prompted left heart catheterization 99% stenosis in the prox/mid LAD segment required 2 PRAVEENA with excellent results post.  He is doing okay.  Chest pain has improved.  He does have occasional atypical left-sided pain that is concerning for some element of pericarditis.  Worse when he lays flat.  EKG today sinus rhythm without any ischemic changes        May 13, 2024:  Pleasant 68 years old gentleman here for Initial visit with me for chest pain and risk stratification.  Overall he is doing well.  Plan to have a prostate surgery.  For the last 2 weeks he noticed 3 episodes of chest tightness toward the left side.  It was all brought in by exertion.  He did not have similar symptoms before.  No significant shortness of breath, lower extremity edema, or PND.  BP is well-controlled as well as his lipids.    PMH significant for type 2 diabetes mellitus, HLP, HTN, and LACHO    SH: No tobacco abuse    FH:  Family history of valvular heart disease    Prior cardiovascular  Hx  --------------------------------  Stress echo June 2024       Left Ventricle: The left ventricle is normal in size. There is normal systolic function.    Right Ventricle: Systolic function is normal.    Stress Protocol:  The patient exercised for 7 minutes 22 seconds on a Garrison protocol, corresponding to a functional capacity of 9METS, achieving a peak heart rate of 141 bpm, which is 93% of the age predicted maximum heart rate. The patient experienced no angina during the test. The patient reported no symptoms during the stress test. Patient reported mild chest discomfort during recovery. The test was stopped because the patient requested it and was experiencing back pain.    Baseline ECG: The Baseline ECG reveals sinus rhythm. The baseline ECG has non-specific ST-T wave changes. The initial ECG portion of the study with significant artifacts.    Stress ECG: There is of upward-sloping ST segment depression noted during stress. During stress, rare PVCs are noted. There is normal blood pressure response with stress.    ECG Conclusion: The ECG portion of the study is abnormal but not diagnostic due to resting ST-T abnormalities and significant artifact.    Post-stress Echo: The left ventricle systolic function is hyperdynamic. Right ventricle systolic function is normal.    Post-stress Impression: The study is normal, negative with no echocardiographic evidence of stress induced ischemia and not determinate due to indeterminate ECG.    [facility] June 1, 2024  Access: right radial with a 5-6 slender sheath  LM:  PATRICIA  3 flow.  0% stenosis  LAD: PATRICIA  3 flow.  Mid 99% stenosis, prox to mid segment diffuse calcified  50-60% stenosis  LCx:  PATRICIA  3 flow.  30-40% stenosis mid segment  RCA: PATRICIA  3 flow.  Mid RCA 20% stenosis.  Right dominant  LVgram: LVEDP 26 mm Hg     Intervention:    Status post successful PTCA and stent to prox/mid LAD with 4.0 x 20, 4.0 x 20 PRAVEENA in overlapping fashion post dilated with a 4.5 NC balloon at high pressure.  IVUS guided with excellent results.  We had to place the 2nd more proximal stent due to severe disease under IVUS with heavily calcification and significant plaque burden, the  placed initial stent  landing  in the severe diseased segments.      Closure device: Vasc band  Patient tolerated procedure well, no complications        Assessment:    Severe single-vessel coronary artery disease as above   Status post successful PCI with 2 PRAVEENA with excellent results.  IVUS guided intervention with the excellent results     Plan:    Aspirin and Plavix for 12 months   High-intensity statin with LDL goal lower than 70   Maximize medical therapy  - EKG May 2024 showed sinus rhythm with single PVCs.  No acute ischemic changes    - EKG May 13, 2022.  Sinus rhythm without any ischemic changes           Patient Active Problem List    Diagnosis Date Noted    Papillary thyroid carcinoma 12/13/2024    Postoperative hypothyroidism 12/13/2024    Coronary atherosclerosis of native coronary artery 08/14/2024    Thyroid nodule 06/20/2024    Stented coronary artery 06/12/2024      Prox/mid LAD with 2 PRAVEENA 4.0 post dilated with 4.5 NC balloon      Morbid (severe) obesity due to excess calories 09/11/2023    Subluxation of tarsal joint of left foot 05/19/2022    Hyperlipidemia 12/15/2021    Essential hypertension 12/15/2021    Benign prostatic hyperplasia 12/15/2021    Obesity (BMI 35.0-39.9 without comorbidity) 12/15/2021    Type 2 diabetes mellitus with other specified complication, without long-term current use of insulin     Insomnia 08/24/2018    Obstructive sleep apnea 05/26/2016                    LAST HbA1c  Lab Results   Component Value Date    HGBA1C 6.0 (H) 11/05/2024       Lipid panel  Lab Results   Component Value Date    CHOL 113 (L) 11/05/2024    CHOL 108 (L) 10/08/2024    CHOL 140 08/06/2024     Lab Results   Component Value Date    HDL 40 11/05/2024    HDL 37 (L) 10/08/2024    HDL 38 (L) 08/06/2024     Lab Results   Component Value Date    LDLCALC 50.8 (L) 11/05/2024    LDLCALC 45.4 (L) 10/08/2024    LDLCALC 59.2 (L) 08/06/2024     Lab Results   Component Value Date    TRIG 111 11/05/2024    TRIG 128 10/08/2024    TRIG 214 (H)  08/06/2024     Lab Results   Component Value Date    CHOLHDL 35.4 11/05/2024    CHOLHDL 34.3 10/08/2024    CHOLHDL 27.1 08/06/2024            Review of Systems   Constitutional: Negative for chills and fever.   HENT:  Negative for hearing loss and nosebleeds.    Eyes:  Negative for blurred vision.   Cardiovascular:         As in HPI   Respiratory:  Negative for hemoptysis and shortness of breath.    Hematologic/Lymphatic: Negative for bleeding problem.   Skin:  Negative for itching.   Musculoskeletal:  Negative for falls.   Gastrointestinal:  Negative for abdominal pain and hematochezia.   Genitourinary:  Negative for hematuria.   Neurological:  Negative for dizziness and loss of balance.   Psychiatric/Behavioral:  Negative for altered mental status and depression.        Objective:   Physical Exam  Constitutional:       Appearance: He is well-developed. He is obese.   HENT:      Head: Normocephalic and atraumatic.   Eyes:      Conjunctiva/sclera: Conjunctivae normal.   Neck:      Vascular: No carotid bruit or JVD.   Cardiovascular:      Rate and Rhythm: Normal rate and regular rhythm.      Pulses:           Carotid pulses are 2+ on the right side and 2+ on the left side.       Radial pulses are 2+ on the right side and 2+ on the left side.      Heart sounds: Normal heart sounds. No murmur heard.     No friction rub. No gallop.   Pulmonary:      Effort: Pulmonary effort is normal. No respiratory distress.      Breath sounds: Normal breath sounds. No stridor. No wheezing.   Abdominal:      General: Abdomen is flat.      Palpations: Abdomen is soft.   Musculoskeletal:      Cervical back: Neck supple.   Skin:     General: Skin is warm and dry.   Neurological:      Mental Status: He is alert and oriented to person, place, and time.   Psychiatric:         Behavior: Behavior normal.         Assessment:     1. Mixed hyperlipidemia    2. Morbid (severe) obesity due to excess calories    3. Essential hypertension    4.  Atherosclerosis of native coronary artery of native heart with stable angina pectoris    5. Stented coronary artery    6. Obstructive sleep apnea          Plan:    1. Coronary artery disease   Status successful PCI to prox/mid LAD for 99% stenosis.  Two PRAVEENA.   Stable cardiac symptoms  Continue medical therapy.  Aspirin and Plavix.   Okay to proceed with prostate surgery after May 29th 2025.  Okay to hold aspirin and Plavix for 3-5 days before the procedure and restart post      2. Hyperlipidemia   Continue high-intensity statin.  LDL at goal    3. Hypertension  BP well-controlled.  Continue current regimen  Continue cardiac rehab   Nitro p.r.n.    Continue CPAP for LACHO    Six-months follow-up or sooner p.r.n.    Visit today included increased complexity associated with the care of the episodic problem CAD, hypertension, hyperlipidemia, preop, obstructive sleep apnea addressed and managing the longitudinal care of the patient due to the serious and/or complex managed problems     Pertinent cardiac images and EKG reviewed independently.    Continue with current medical plan and lifestyle changes.  Return sooner for concerns or questions. If symptoms persist go to the ED  I have reviewed all pertinent data including patient's medical history in detail and updated the computerized patient record.     No orders of the defined types were placed in this encounter.      Follow up as scheduled.     He expressed verbal understanding and agreed with the plan    Patient's Medications   New Prescriptions    No medications on file   Previous Medications    ALPHA LIPOIC ACID 600 MG CAP    Take by mouth.    AMLODIPINE (NORVASC) 10 MG TABLET    Take 1 tablet (10 mg total) by mouth once daily.    ASPIRIN (ECOTRIN) 81 MG EC TABLET    Take 81 mg by mouth once daily.    CLOPIDOGREL (PLAVIX) 75 MG TABLET    Take 1 tablet (75 mg total) by mouth once daily.    COQ10, UBIQUINOL, 200 MG CAP        FINASTERIDE (PROSCAR) 5 MG TABLET    Take 1  tablet (5 mg total) by mouth once daily.    FLUTICASONE PROPIONATE (FLONASE) 50 MCG/ACTUATION NASAL SPRAY    fluticasone propionate 50 mcg/actuation nasal spray,suspension    LEVOTHYROXINE (SYNTHROID) 150 MCG TABLET    Take 1 tablet (150 mcg total) by mouth before breakfast.    LOSARTAN-HYDROCHLOROTHIAZIDE 100-12.5 MG (HYZAAR) 100-12.5 MG TAB    TAKE 1 TABLET BY MOUTH EVERY DAY    METFORMIN (GLUCOPHAGE) 1000 MG TABLET    TAKE 1 TABLET(1000 MG) BY MOUTH TWICE DAILY WITH MEALS    MIRABEGRON (MYRBETRIQ) 50 MG TB24    Take 1 tablet (50 mg total) by mouth once daily.    MULTIVIT WITH MINERALS/LUTEIN (MULTIVITAMIN 50 PLUS ORAL)    Take by mouth.    NITROGLYCERIN (NITROSTAT) 0.4 MG SL TABLET    Place 1 tablet (0.4 mg total) under the tongue every 5 (five) minutes as needed for Chest pain.    OMEGA-3 FATTY ACIDS/FISH OIL (FISH OIL-OMEGA-3 FATTY ACIDS) 300-1,000 MG CAPSULE    Take by mouth once daily.    ROSUVASTATIN (CRESTOR) 20 MG TABLET    TAKE 1 TABLET(20 MG) BY MOUTH EVERY DAY   Modified Medications    No medications on file   Discontinued Medications    No medications on file

## 2025-04-07 ENCOUNTER — OFFICE VISIT (OUTPATIENT)
Dept: PRIMARY CARE CLINIC | Facility: CLINIC | Age: 70
End: 2025-04-07
Payer: MEDICARE

## 2025-04-07 VITALS
DIASTOLIC BLOOD PRESSURE: 75 MMHG | HEART RATE: 67 BPM | OXYGEN SATURATION: 96 % | BODY MASS INDEX: 32.08 KG/M2 | SYSTOLIC BLOOD PRESSURE: 135 MMHG | WEIGHT: 243.19 LBS

## 2025-04-07 DIAGNOSIS — Z79.899 OTHER LONG TERM (CURRENT) DRUG THERAPY: ICD-10-CM

## 2025-04-07 DIAGNOSIS — I10 ESSENTIAL HYPERTENSION: ICD-10-CM

## 2025-04-07 DIAGNOSIS — E78.2 MIXED HYPERLIPIDEMIA: Primary | ICD-10-CM

## 2025-04-07 DIAGNOSIS — E11.69 TYPE 2 DIABETES MELLITUS WITH OTHER SPECIFIED COMPLICATION, WITHOUT LONG-TERM CURRENT USE OF INSULIN: ICD-10-CM

## 2025-04-07 DIAGNOSIS — N40.0 BENIGN PROSTATIC HYPERPLASIA, UNSPECIFIED WHETHER LOWER URINARY TRACT SYMPTOMS PRESENT: ICD-10-CM

## 2025-04-07 DIAGNOSIS — C73 PAPILLARY CARCINOMA OF THYROID: ICD-10-CM

## 2025-04-07 DIAGNOSIS — G47.33 OBSTRUCTIVE SLEEP APNEA: ICD-10-CM

## 2025-04-07 DIAGNOSIS — Z12.5 PROSTATE CANCER SCREENING: ICD-10-CM

## 2025-04-07 PROCEDURE — 3075F SYST BP GE 130 - 139MM HG: CPT | Mod: CPTII,S$GLB,, | Performed by: STUDENT IN AN ORGANIZED HEALTH CARE EDUCATION/TRAINING PROGRAM

## 2025-04-07 PROCEDURE — 1160F RVW MEDS BY RX/DR IN RCRD: CPT | Mod: CPTII,S$GLB,, | Performed by: STUDENT IN AN ORGANIZED HEALTH CARE EDUCATION/TRAINING PROGRAM

## 2025-04-07 PROCEDURE — 1159F MED LIST DOCD IN RCRD: CPT | Mod: CPTII,S$GLB,, | Performed by: STUDENT IN AN ORGANIZED HEALTH CARE EDUCATION/TRAINING PROGRAM

## 2025-04-07 PROCEDURE — G2211 COMPLEX E/M VISIT ADD ON: HCPCS | Mod: S$GLB,,, | Performed by: STUDENT IN AN ORGANIZED HEALTH CARE EDUCATION/TRAINING PROGRAM

## 2025-04-07 PROCEDURE — 99999 PR PBB SHADOW E&M-EST. PATIENT-LVL IV: CPT | Mod: PBBFAC,,, | Performed by: STUDENT IN AN ORGANIZED HEALTH CARE EDUCATION/TRAINING PROGRAM

## 2025-04-07 PROCEDURE — 3078F DIAST BP <80 MM HG: CPT | Mod: CPTII,S$GLB,, | Performed by: STUDENT IN AN ORGANIZED HEALTH CARE EDUCATION/TRAINING PROGRAM

## 2025-04-07 PROCEDURE — 3008F BODY MASS INDEX DOCD: CPT | Mod: CPTII,S$GLB,, | Performed by: STUDENT IN AN ORGANIZED HEALTH CARE EDUCATION/TRAINING PROGRAM

## 2025-04-07 PROCEDURE — 3044F HG A1C LEVEL LT 7.0%: CPT | Mod: CPTII,S$GLB,, | Performed by: STUDENT IN AN ORGANIZED HEALTH CARE EDUCATION/TRAINING PROGRAM

## 2025-04-07 PROCEDURE — 99214 OFFICE O/P EST MOD 30 MIN: CPT | Mod: S$GLB,,, | Performed by: STUDENT IN AN ORGANIZED HEALTH CARE EDUCATION/TRAINING PROGRAM

## 2025-04-07 PROCEDURE — 3066F NEPHROPATHY DOC TX: CPT | Mod: CPTII,S$GLB,, | Performed by: STUDENT IN AN ORGANIZED HEALTH CARE EDUCATION/TRAINING PROGRAM

## 2025-04-07 PROCEDURE — 3061F NEG MICROALBUMINURIA REV: CPT | Mod: CPTII,S$GLB,, | Performed by: STUDENT IN AN ORGANIZED HEALTH CARE EDUCATION/TRAINING PROGRAM

## 2025-04-07 NOTE — PROGRESS NOTES
SUBJECTIVE     Chief Complaint   Patient presents with    Follow-up    Establish Care       HPI  Yuriy Jerome is a 69 y.o. male with medical diagnoses as listed in the medical history and problem list that presents for annual exam. Pt is establishing care with me today.    Pt is UTD on age appropriate CA screening.    Family, social, surgical Hx reviewed     LACHO: Working with sleep medicine for bipap adjustments    Type 2 diabetes -  Medications:  metformin 1000mg bid  Tolerating medications without issues.  -due for foot exam and diabetic eye exam  Statin:  Crestor 20 mg daily  Ace/Arb:  losartan  Complications:  Denies diabetic neuropathy, vision problems, increased thirst, frequency of urination    last A1c's were   Lab Results   Component Value Date    HGBA1C 5.8 (H) 04/17/2025    HGBA1C 6.0 (H) 11/05/2024    HGBA1C 6.0 (H) 10/08/2024     Lab Results   Component Value Date    GLUF 116 (H) 11/05/2024    LDLCALC 50.8 (L) 11/05/2024    CREATININE 0.8 04/17/2025         Papillary thyroid carcinoma  Postoperative hypothyroidism, on synthroid 150 mcg daily  First noted to have thyroid nodules in 2017, reports FNA at that time 'indeterminate'     No family history of thyroid cancer or head/neck irradiation. No hypothyroidism or Hashimoto's prior to thyroid surgery.        Thyroid FNA dated: 5/21/24  1. Thyroid nodule, right, fine needle aspiration:   [facility] Thyroid Cytology Category: Atypia Undetermined Significance (AUS)     2. Thyroid nodule, isthmus, fine needle aspiration:   [facility] Thyroid Cytology Category: Atypia Undetermined Significance (AUS)         S/p total thyroidectomy with or without CN neck dissection    BPH-finasteride 5 mg daily    HTN -   Currently prescribed losartan-hctz 100-12.5mg and amlodipine 10 mg daily.  Patient endorses taking medication as directed.  Denies side effects or concerns while taking medication.  Patient not currently checking BP at home.  Denies headaches,  vision changes, CP, palpitations, or other concerning symptoms.  Lab Results   Component Value Date    MICALBCREAT 5.7 04/17/2025     BP Readings from Last 3 Encounters:   04/07/25 135/75   04/03/25 119/69   02/27/25 (!) 140/81         Health Maintenance         Date Due Completion Date    Foot Exam Never done ---    TETANUS VACCINE Never done ---    Diabetic Eye Exam 01/27/2024 1/27/2023    COVID-19 Vaccine (7 - 2024-25 season) 12/10/2024 10/15/2024    Hemoglobin A1c 10/17/2025 4/17/2025    Colorectal Cancer Screening 12/21/2025 6/26/2023    High Dose Statin 04/03/2026 4/3/2025    PROSTATE-SPECIFIC ANTIGEN 04/17/2026 4/17/2025    Diabetes Urine Screening 04/17/2026 4/17/2025    Lipid Panel 04/17/2026 4/17/2025    Override on 8/1/2021: Done              PAST MEDICAL HISTORY:  Past Medical History:   Diagnosis Date    Allergy     Asthma 2000    Cataract 2018    Cataracts have been corrected.    Diabetes mellitus 2022    prediabetic    Hearing loss 2023    Hypertension 2014?    Migraine headache 1985?    Obesity 2014?    Osteoarthritis 1990?    Seasonal allergies 1981?    Sleep apnea 1990?    Staphylococcal infection     left ankle, 2017       PAST SURGICAL HISTORY:  Past Surgical History:   Procedure Laterality Date    ADENOIDECTOMY  2001    UPPP surgery    APPENDECTOMY  2005    BICEPS TENDON REPAIR Left 12/28/2021    BICEPS TENDON REPAIR Right 2025    CARDIAC SURGERY  6/4/24    Angiogram, balloon angioplasty, stents placed in LAD    CORONARY ANGIOGRAPHY N/A 06/04/2024    Procedure: ANGIOGRAM, CORONARY ARTERY;  Surgeon: Raji Noble MD;  Location: [facility] CATH LAB/EP;  Service: Cardiology;  Laterality: N/A;    EYE SURGERY  2017    Cararact surgery    FOOT SURGERY Left 05/2017    subtalar implant, 6 weeks later removed due to failure    FOOT SURGERY Left 05/19/2022    HERNIA REPAIR  1963?    DOUBLE age 8    IVUS, CORONARY  06/04/2024    Procedure: IVUS, Coronary;  Surgeon: Raji Noble MD;  Location: [facility]  CATH LAB/EP;  Service: Cardiology;;    JOINT REPLACEMENT  2009    Bilateral knee replacement    KNEE SURGERY Bilateral 05/2011    TKR, had ligament replacement in right , meniscus repair both knees    LAMINECTOMY  1989    spinal    LEFT HEART CATHETERIZATION Left 06/04/2024    Procedure: Left heart cath;  Surgeon: Raji Noble MD;  Location: [facility] CATH LAB/EP;  Service: Cardiology;  Laterality: Left;    PERCUTANEOUS CORONARY INTERVENTION, ARTERY N/A 06/04/2024    Procedure: Percutaneous coronary intervention;  Surgeon: Raji Noble MD;  Location: [facility] CATH LAB/EP;  Service: Cardiology;  Laterality: N/A;    PTCA, SINGLE VESSEL  06/04/2024    Procedure: PTCA, Single Vessel;  Surgeon: Raji Noble MD;  Location: [facility] CATH LAB/EP;  Service: Cardiology;;    SINUS SURGERY  2012?    Balloon sinuplasty    SPINE SURGERY  1991    Triple laminectomy    THYROIDECTOMY Bilateral 11/15/2024    Procedure: THYROIDECTOMY, TOTAL;  Surgeon: Vlad Avelar MD;  Location: [address];  Service: ENT;  Laterality: Bilateral;  Nerve monitoring booked at 7am on 11/15 (CL 10/21).    TONSILLECTOMY  2001    UPPP surgery    UVULOPALATOPHARYNGOPLASTY  2007    VASECTOMY  1985       SOCIAL HISTORY:  Social History[1]    FAMILY HISTORY:  Family History   Problem Relation Name Age of Onset    Arthritis Mother Adenike     Stroke Mother Adenike         Suspected mini-strokes    Osteoarthritis Mother Adenike     Migraines Mother Adenike     Asthma Father Pat     Heart disease Father Pat     Heart failure Father Pat         Mitral valve prolapse, replacement    Cancer Maternal Grandfather Daron         Lung cancer    Cancer Maternal Grandmother Erin         Breast cancer    Drug abuse Brother Anshul     Drug abuse Brother Brucedavid     Drug abuse Brother Kapil     Heart failure Brother Kapil         Mitral valve prolapse, currently scheduled for replacement    Drug abuse Brother Mehul        ALLERGIES AND MEDICATIONS: updated and  reviewed.  Review of patient's allergies indicates:   Allergen Reactions    Hay fever and allergy relief Other (See Comments)     Current Medications[2]    ROS  Review of Systems   Constitutional:  Negative for fever and weight loss.   Respiratory:  Negative for cough and shortness of breath.    Cardiovascular:  Negative for chest pain and palpitations.   Gastrointestinal:  Negative for abdominal pain, constipation, diarrhea, nausea and vomiting.   Genitourinary:  Negative for dysuria.   Musculoskeletal:  Negative for back pain and joint pain.   Skin:  Negative for rash.   Neurological:  Negative for dizziness, weakness and headaches.   Psychiatric/Behavioral:  Negative for depression. The patient is not nervous/anxious.            OBJECTIVE     Physical Exam  Vitals:    04/07/25 1326   BP: 135/75   Pulse:     Body mass index is 32.08 kg/m².  Weight: 110.3 kg (243 lb 2.7 oz)         Physical Exam  HENT:      Head: Normocephalic and atraumatic.      Nose: Nose normal.      Mouth/Throat:      Mouth: Mucous membranes are moist.      Pharynx: Oropharynx is clear.   Eyes:      Extraocular Movements: Extraocular movements intact.      Conjunctiva/sclera: Conjunctivae normal.      Pupils: Pupils are equal, round, and reactive to light.   Pulmonary:      Effort: Pulmonary effort is normal.   Musculoskeletal:         General: No swelling. Normal range of motion.      Cervical back: Normal range of motion.      Right lower leg: No edema.      Left lower leg: No edema.   Skin:     General: Skin is warm.      Findings: No lesion or rash.   Neurological:      General: No focal deficit present.      Mental Status: He is alert and oriented to person, place, and time.      Motor: No weakness.               ASSESSMENT     69 y.o. male with     1. Mixed hyperlipidemia    2. Essential hypertension    3. Type 2 diabetes mellitus with other specified complication, without long-term current use of insulin    4. Obstructive sleep apnea     5. Other long term (current) drug therapy    6. Benign prostatic hyperplasia, unspecified whether lower urinary tract symptoms present    7. Prostate cancer screening    8. Papillary carcinoma of thyroid        PLAN:     1. Mixed hyperlipidemia  Stable on medications, continue regimen    2. Essential hypertension  Stable on medications, continue regimen    3. Type 2 diabetes mellitus with other specified complication, without long-term current use of insulin  -     Microalbumin/Creatinine Ratio, Urine; Future; Expected date: 04/07/2025  Stable on medications, continue regimen    4. Obstructive sleep apnea  Continue working with sleep medicine    5. Other long term (current) drug therapy  -     Hemoglobin A1C; Future; Expected date: 04/07/2025  -     TSH; Future; Expected date: 04/07/2025  -     Lipid Panel; Future; Expected date: 04/07/2025  -     Comprehensive Metabolic Panel; Future; Expected date: 04/07/2025  -     CBC Auto Differential; Future; Expected date: 04/07/2025  -     Microalbumin/Creatinine Ratio, Urine; Future; Expected date: 04/07/2025    6. Benign prostatic hyperplasia, unspecified whether lower urinary tract symptoms present  Stable on medications, continue regimen    7. Prostate cancer screening  -     PSA, Screening; Future; Expected date: 04/07/2025    8. Papillary carcinoma of thyroid  Stable on medications, continue regimen      This is a patient with a chronic and complex diagnoses whose overall, ongoing care is being managed and monitored by me and our Internal Medicine clinic.   As such, since 2024,  is the appropriate add-on code to accompany the other E/M billing for this visit.     Discussed age and gender appropriate screenings at this visit and encouraged a healthy diet low in simple carbohydrates, and increased physical activity.  Counseled on medically appropriate vaccines based on age and current health condition.  Screening test reviewed and discussed with patient.      RTC  in 6 months    Soraida Lowry MD  [facility]                             [1]   Social History  Socioeconomic History    Marital status:    Tobacco Use    Smoking status: Never    Smokeless tobacco: Never   Substance and Sexual Activity    Alcohol use: Yes     Alcohol/week: 11.0 standard drinks of alcohol     Types: 7 Glasses of wine, 4 Cans of beer per week    Drug use: No    Sexual activity: Yes     Partners: Female     Birth control/protection: Other-see comments     Comment: Vasectomy     Social Drivers of Health     Financial Resource Strain: Low Risk  (3/27/2025)    Overall Financial Resource Strain (CARDIA)     Difficulty of Paying Living Expenses: Not hard at all   Food Insecurity: No Food Insecurity (3/27/2025)    Hunger Vital Sign     Worried About Running Out of Food in the Last Year: Never true     Ran Out of Food in the Last Year: Never true   Transportation Needs: No Transportation Needs (3/27/2025)    PRAPARE - Transportation     Lack of Transportation (Medical): No     Lack of Transportation (Non-Medical): No   Physical Activity: Sufficiently Active (3/27/2025)    Exercise Vital Sign     Days of Exercise per Week: 3 days     Minutes of Exercise per Session: 60 min   Stress: No Stress Concern Present (3/27/2025)    [employer] [place] [employer] - Occupational Stress Questionnaire     Feeling of Stress : Only a little   Housing Stability: Low Risk  (3/27/2025)    Housing Stability Vital Sign     Unable to Pay for Housing in the Last Year: No     Number of Times Moved in the Last Year: 0     Homeless in the Last Year: No   [2]   Current Outpatient Medications   Medication Sig Dispense Refill    alpha lipoic acid 600 mg Cap Take by mouth.      amLODIPine (NORVASC) 10 MG tablet Take 1 tablet (10 mg total) by mouth once daily. 90 tablet 3    aspirin (ECOTRIN) 81 MG EC tablet Take 81 mg by mouth once daily.      clopidogreL (PLAVIX) 75 mg tablet Take 1 tablet (75 mg  total) by mouth once daily. 90 tablet 3    coQ10, ubiquinol, 200 mg Cap       finasteride (PROSCAR) 5 mg tablet Take 1 tablet (5 mg total) by mouth once daily. 90 tablet 3    fluticasone propionate (FLONASE) 50 mcg/actuation nasal spray fluticasone propionate 50 mcg/actuation nasal spray,suspension      levothyroxine (SYNTHROID) 150 MCG tablet Take 1 tablet (150 mcg total) by mouth before breakfast. 30 tablet 11    losartan-hydrochlorothiazide 100-12.5 mg (HYZAAR) 100-12.5 mg Tab TAKE 1 TABLET BY MOUTH EVERY DAY 90 tablet 2    metFORMIN (GLUCOPHAGE) 1000 MG tablet TAKE 1 TABLET(1000 MG) BY MOUTH TWICE DAILY WITH MEALS 180 tablet 1    mirabegron (MYRBETRIQ) 50 mg Tb24 Take 1 tablet (50 mg total) by mouth once daily. 90 tablet 3    multivit with minerals/lutein (MULTIVITAMIN 50 PLUS ORAL) Take by mouth.      nitroGLYCERIN (NITROSTAT) 0.4 MG SL tablet Place 1 tablet (0.4 mg total) under the tongue every 5 (five) minutes as needed for Chest pain. 20 tablet 12    omega-3 fatty acids/fish oil (FISH OIL-OMEGA-3 FATTY ACIDS) 300-1,000 mg capsule Take by mouth once daily.      rosuvastatin (CRESTOR) 20 MG tablet TAKE 1 TABLET(20 MG) BY MOUTH EVERY DAY 90 tablet 3     Current Facility-Administered Medications   Medication Dose Route Frequency Provider Last Rate Last Admin    sodium chloride 0.9% flush 10 mL  10 mL Intravenous Raji Rahman MD

## 2025-04-17 ENCOUNTER — LAB VISIT (OUTPATIENT)
Dept: LAB | Facility: HOSPITAL | Age: 70
End: 2025-04-17
Attending: STUDENT IN AN ORGANIZED HEALTH CARE EDUCATION/TRAINING PROGRAM
Payer: MEDICARE

## 2025-04-17 DIAGNOSIS — Z79.899 OTHER LONG TERM (CURRENT) DRUG THERAPY: ICD-10-CM

## 2025-04-17 DIAGNOSIS — Z12.5 PROSTATE CANCER SCREENING: ICD-10-CM

## 2025-04-17 DIAGNOSIS — E11.69 TYPE 2 DIABETES MELLITUS WITH OTHER SPECIFIED COMPLICATION, WITHOUT LONG-TERM CURRENT USE OF INSULIN: ICD-10-CM

## 2025-04-17 LAB
ABSOLUTE EOSINOPHIL (OHS): 0.19 K/UL
ABSOLUTE MONOCYTE (OHS): 0.59 K/UL (ref 0.3–1)
ABSOLUTE NEUTROPHIL COUNT (OHS): 2.88 K/UL (ref 1.8–7.7)
ALBUMIN SERPL BCP-MCNC: 4 G/DL (ref 3.5–5.2)
ALBUMIN/CREAT UR: 5.7 UG/MG
ALP SERPL-CCNC: 61 UNIT/L (ref 40–150)
ALT SERPL W/O P-5'-P-CCNC: 18 UNIT/L (ref 10–44)
ANION GAP (OHS): 9 MMOL/L (ref 8–16)
AST SERPL-CCNC: 16 UNIT/L (ref 11–45)
BASOPHILS # BLD AUTO: 0.05 K/UL
BASOPHILS NFR BLD AUTO: 1 %
BILIRUB SERPL-MCNC: 0.7 MG/DL (ref 0.1–1)
BUN SERPL-MCNC: 17 MG/DL (ref 8–23)
CALCIUM SERPL-MCNC: 9 MG/DL (ref 8.7–10.5)
CHLORIDE SERPL-SCNC: 110 MMOL/L (ref 95–110)
CHOLEST SERPL-MCNC: 106 MG/DL (ref 120–199)
CHOLEST/HDLC SERPL: 2.9 {RATIO} (ref 2–5)
CO2 SERPL-SCNC: 22 MMOL/L (ref 23–29)
CREAT SERPL-MCNC: 0.8 MG/DL (ref 0.5–1.4)
CREAT UR-MCNC: 194 MG/DL (ref 23–375)
EAG (OHS): 120 MG/DL (ref 68–131)
ERYTHROCYTE [DISTWIDTH] IN BLOOD BY AUTOMATED COUNT: 13 % (ref 11.5–14.5)
GFR SERPLBLD CREATININE-BSD FMLA CKD-EPI: >60 ML/MIN/1.73/M2
GLUCOSE SERPL-MCNC: 120 MG/DL (ref 70–110)
HBA1C MFR BLD: 5.8 % (ref 4–5.6)
HCT VFR BLD AUTO: 41.8 % (ref 40–54)
HDLC SERPL-MCNC: 37 MG/DL (ref 40–75)
HDLC SERPL: 34.9 % (ref 20–50)
HGB BLD-MCNC: 14.3 GM/DL (ref 14–18)
IMM GRANULOCYTES # BLD AUTO: 0.01 K/UL (ref 0–0.04)
IMM GRANULOCYTES NFR BLD AUTO: 0.2 % (ref 0–0.5)
LDLC SERPL CALC-MCNC: 52 MG/DL (ref 63–159)
LYMPHOCYTES # BLD AUTO: 1.24 K/UL (ref 1–4.8)
MCH RBC QN AUTO: 32.1 PG (ref 27–31)
MCHC RBC AUTO-ENTMCNC: 34.2 G/DL (ref 32–36)
MCV RBC AUTO: 94 FL (ref 82–98)
MICROALBUMIN UR-MCNC: 11 UG/ML (ref ?–5000)
NONHDLC SERPL-MCNC: 69 MG/DL
NUCLEATED RBC (/100WBC) (OHS): 0 /100 WBC
PLATELET # BLD AUTO: 228 K/UL (ref 150–450)
PMV BLD AUTO: 10.4 FL (ref 9.2–12.9)
POTASSIUM SERPL-SCNC: 4.3 MMOL/L (ref 3.5–5.1)
PROT SERPL-MCNC: 6.8 GM/DL (ref 6–8.4)
PSA SERPL-MCNC: 4.89 NG/ML
RBC # BLD AUTO: 4.46 M/UL (ref 4.6–6.2)
RELATIVE EOSINOPHIL (OHS): 3.8 %
RELATIVE LYMPHOCYTE (OHS): 25 % (ref 18–48)
RELATIVE MONOCYTE (OHS): 11.9 % (ref 4–15)
RELATIVE NEUTROPHIL (OHS): 58.1 % (ref 38–73)
SODIUM SERPL-SCNC: 141 MMOL/L (ref 136–145)
T4 FREE SERPL-MCNC: 1.31 NG/DL (ref 0.71–1.51)
TRIGL SERPL-MCNC: 85 MG/DL (ref 30–150)
TSH SERPL-ACNC: 0.19 UIU/ML (ref 0.4–4)
WBC # BLD AUTO: 4.96 K/UL (ref 3.9–12.7)

## 2025-04-17 PROCEDURE — 84075 ASSAY ALKALINE PHOSPHATASE: CPT

## 2025-04-17 PROCEDURE — 85025 COMPLETE CBC W/AUTO DIFF WBC: CPT

## 2025-04-17 PROCEDURE — 84439 ASSAY OF FREE THYROXINE: CPT

## 2025-04-17 PROCEDURE — 36415 COLL VENOUS BLD VENIPUNCTURE: CPT

## 2025-04-17 PROCEDURE — 82465 ASSAY BLD/SERUM CHOLESTEROL: CPT

## 2025-04-17 PROCEDURE — 84153 ASSAY OF PSA TOTAL: CPT

## 2025-04-17 PROCEDURE — 83036 HEMOGLOBIN GLYCOSYLATED A1C: CPT

## 2025-04-17 PROCEDURE — 82570 ASSAY OF URINE CREATININE: CPT

## 2025-04-17 PROCEDURE — 84443 ASSAY THYROID STIM HORMONE: CPT

## 2025-04-23 ENCOUNTER — RESULTS FOLLOW-UP (OUTPATIENT)
Dept: PRIMARY CARE CLINIC | Facility: CLINIC | Age: 70
End: 2025-04-23

## 2025-05-06 ENCOUNTER — LAB VISIT (OUTPATIENT)
Dept: LAB | Facility: HOSPITAL | Age: 70
End: 2025-05-06
Attending: INTERNAL MEDICINE
Payer: MEDICARE

## 2025-05-06 DIAGNOSIS — E11.9 TYPE 2 DIABETES MELLITUS WITHOUT COMPLICATION: ICD-10-CM

## 2025-05-06 DIAGNOSIS — C73 PAPILLARY THYROID CARCINOMA: ICD-10-CM

## 2025-05-06 LAB — TSH SERPL-ACNC: 0.63 UIU/ML (ref 0.4–4)

## 2025-05-06 PROCEDURE — 84443 ASSAY THYROID STIM HORMONE: CPT

## 2025-05-06 PROCEDURE — 84432 ASSAY OF THYROGLOBULIN: CPT

## 2025-05-06 PROCEDURE — 36415 COLL VENOUS BLD VENIPUNCTURE: CPT

## 2025-05-07 ENCOUNTER — PATIENT MESSAGE (OUTPATIENT)
Dept: ENDOCRINOLOGY | Facility: CLINIC | Age: 70
End: 2025-05-07
Payer: MEDICARE

## 2025-05-07 DIAGNOSIS — C73 PAPILLARY THYROID CARCINOMA: Primary | ICD-10-CM

## 2025-05-07 LAB
ENDOCRINOLOGIST REVIEW: NORMAL
THYROGLOB AB SERPL IA-ACNC: <1.8 IU/ML
THYROGLOB SERPL-MCNC: 17 NG/ML

## 2025-05-12 ENCOUNTER — TELEPHONE (OUTPATIENT)
Dept: ENDOCRINOLOGY | Facility: CLINIC | Age: 70
End: 2025-05-12
Payer: MEDICARE

## 2025-05-12 RX ORDER — CLOPIDOGREL BISULFATE 75 MG/1
TABLET ORAL
Qty: 90 TABLET | Refills: 3 | Status: SHIPPED | OUTPATIENT
Start: 2025-05-12

## 2025-05-15 ENCOUNTER — OFFICE VISIT (OUTPATIENT)
Dept: OTOLARYNGOLOGY | Facility: CLINIC | Age: 70
End: 2025-05-15
Payer: MEDICARE

## 2025-05-15 VITALS
BODY MASS INDEX: 31.81 KG/M2 | WEIGHT: 240 LBS | SYSTOLIC BLOOD PRESSURE: 139 MMHG | DIASTOLIC BLOOD PRESSURE: 84 MMHG | HEART RATE: 65 BPM | HEIGHT: 73 IN

## 2025-05-15 DIAGNOSIS — G47.33 OSA (OBSTRUCTIVE SLEEP APNEA): Primary | ICD-10-CM

## 2025-05-15 DIAGNOSIS — J33.8 OTHER POLYP OF SINUS: ICD-10-CM

## 2025-05-15 DIAGNOSIS — J33.9 NASAL POLYPS: ICD-10-CM

## 2025-05-15 DIAGNOSIS — G47.01 INSOMNIA DUE TO MEDICAL CONDITION: ICD-10-CM

## 2025-05-15 PROCEDURE — 3066F NEPHROPATHY DOC TX: CPT | Mod: CPTII,S$GLB,, | Performed by: STUDENT IN AN ORGANIZED HEALTH CARE EDUCATION/TRAINING PROGRAM

## 2025-05-15 PROCEDURE — 3079F DIAST BP 80-89 MM HG: CPT | Mod: CPTII,S$GLB,, | Performed by: STUDENT IN AN ORGANIZED HEALTH CARE EDUCATION/TRAINING PROGRAM

## 2025-05-15 PROCEDURE — 3008F BODY MASS INDEX DOCD: CPT | Mod: CPTII,S$GLB,, | Performed by: STUDENT IN AN ORGANIZED HEALTH CARE EDUCATION/TRAINING PROGRAM

## 2025-05-15 PROCEDURE — 1159F MED LIST DOCD IN RCRD: CPT | Mod: CPTII,S$GLB,, | Performed by: STUDENT IN AN ORGANIZED HEALTH CARE EDUCATION/TRAINING PROGRAM

## 2025-05-15 PROCEDURE — 3075F SYST BP GE 130 - 139MM HG: CPT | Mod: CPTII,S$GLB,, | Performed by: STUDENT IN AN ORGANIZED HEALTH CARE EDUCATION/TRAINING PROGRAM

## 2025-05-15 PROCEDURE — 3044F HG A1C LEVEL LT 7.0%: CPT | Mod: CPTII,S$GLB,, | Performed by: STUDENT IN AN ORGANIZED HEALTH CARE EDUCATION/TRAINING PROGRAM

## 2025-05-15 PROCEDURE — 1126F AMNT PAIN NOTED NONE PRSNT: CPT | Mod: CPTII,S$GLB,, | Performed by: STUDENT IN AN ORGANIZED HEALTH CARE EDUCATION/TRAINING PROGRAM

## 2025-05-15 PROCEDURE — 99215 OFFICE O/P EST HI 40 MIN: CPT | Mod: S$GLB,,, | Performed by: STUDENT IN AN ORGANIZED HEALTH CARE EDUCATION/TRAINING PROGRAM

## 2025-05-15 PROCEDURE — 3061F NEG MICROALBUMINURIA REV: CPT | Mod: CPTII,S$GLB,, | Performed by: STUDENT IN AN ORGANIZED HEALTH CARE EDUCATION/TRAINING PROGRAM

## 2025-05-15 PROCEDURE — 1101F PT FALLS ASSESS-DOCD LE1/YR: CPT | Mod: CPTII,S$GLB,, | Performed by: STUDENT IN AN ORGANIZED HEALTH CARE EDUCATION/TRAINING PROGRAM

## 2025-05-15 PROCEDURE — 3288F FALL RISK ASSESSMENT DOCD: CPT | Mod: CPTII,S$GLB,, | Performed by: STUDENT IN AN ORGANIZED HEALTH CARE EDUCATION/TRAINING PROGRAM

## 2025-05-15 NOTE — PROGRESS NOTES
Ear, Nose, & Throat  Otolaryngology - Head & Neck Surgery        Subjective:     Chief Complaint: LACHO    Yuriy Jerome is a 69 y.o. male who was referred to me by Dr Lowry in consultation for LACHO.    Long-time history of LACHO with CPAP/BiPAP issues. Has recently transitioned to BiPAP. Last saw Alison Samano 2/2025. Issues initiating sleep.     Issues with urinary incontinence. Sees urology. Had a procedure scheduled with urology but had unstable angina in 2024 s/p PCI to LAD. ASA 81 and plavix.     Currently on BiPAP.     Sleep Study  Type: in-lab  Date: 3/17/2025  AHI: 96  RDI: 100  SANDY: 0  T90: 3.4%  O2 Americo: 87%  Comments:       BMI: 31.66  Neck Circumference: -    Trial of PAP therapy? Yes  Masks Tried: oronasal and nasal mask  Outcome: nonadherence, issues with mask leak, dry mouth, tight mask fit  Machine Returned? Yes  Other Therapies Trialed: none  ESS: 6  ARAVIND: 13 (2 2 2)   -    Smoker or former smoker? No  Do you drink more than 5 alcoholic beverages a week? Yes - wine  Do you take any medication that cause drowsiness? No  Do you take any medications specifically for sleep? No  Stimulants? sudafed    Nasal history: Chronic sinusitis, allergic rhinitis, nasal polyps  Nasal surgeries: Septoplasty, Balloon Sinuplasty  Current sinonasal meds: Flonase, sudafed prn  Last sinus infection:   NOSE score: 3 3 3 3 3    Prior airway surgeries? UPPP ([facility] 1980s)  Prior head/neck/chest surgeries? Thyroidectomy (PTC - [facility]) followed by [facility]  Previous radiation to head/neck/chest? none  Comorbid Conditions: CAD, HTN, T2DM  Existing implants: None  Anticoagulation: Plavix & Aspirin    Past Medical History  Active Ambulatory Problems     Diagnosis Date Noted    Obstructive sleep apnea 05/26/2016    Insomnia 08/24/2018    Hyperlipidemia 12/15/2021    Essential hypertension 12/15/2021    Benign prostatic hyperplasia 12/15/2021    Obesity (BMI 35.0-39.9 without comorbidity) 12/15/2021    Type 2 diabetes mellitus with  other specified complication, without long-term current use of insulin     Subluxation of tarsal joint of left foot 05/19/2022    Morbid (severe) obesity due to excess calories 09/11/2023    Stented coronary artery 06/12/2024    Thyroid nodule 06/20/2024    Coronary atherosclerosis of native coronary artery 08/14/2024    Papillary thyroid carcinoma 12/13/2024    Postoperative hypothyroidism 12/13/2024     Resolved Ambulatory Problems     Diagnosis Date Noted    No Resolved Ambulatory Problems     Past Medical History:   Diagnosis Date    Allergy     Asthma 2000    Cataract 2018    Diabetes mellitus 2022    Hearing loss 2023    Hypertension 2014?    Migraine headache 1985?    Obesity 2014?    Osteoarthritis 1990?    Seasonal allergies 1981?    Sleep apnea 1990?    Staphylococcal infection        Past Surgical History  He has a past surgical history that includes Laminectomy (1989); Hernia repair (1963?); Knee surgery (Bilateral, 05/2011); Foot surgery (Left, 05/2017); Uvulopalatopharyngoplasty (2007); Foot surgery (Left, 05/19/2022); Biceps tendon repair (Left, 12/28/2021); Adenoidectomy (2001); Appendectomy (2005); Eye surgery (2017); Spine surgery (1991); Tonsillectomy (2001); Joint replacement (2009); Vasectomy (1985); Left heart catheterization (Left, 06/04/2024); Coronary angiography (N/A, 06/04/2024); ptca, single vessel (06/04/2024); percutaneous coronary intervention, artery (N/A, 06/04/2024); ivus, coronary (06/04/2024); Thyroidectomy (Bilateral, 11/15/2024); Sinus surgery (2012?); Cardiac surgery (6/4/24); and Biceps tendon repair (Right, 2025).    Past Surgical History:   Procedure Laterality Date    ADENOIDECTOMY  2001    UPPP surgery    APPENDECTOMY  2005    BICEPS TENDON REPAIR Left 12/28/2021    BICEPS TENDON REPAIR Right 2025    CARDIAC SURGERY  6/4/24    Angiogram, balloon angioplasty, stents placed in LAD    CORONARY ANGIOGRAPHY N/A 06/04/2024    Procedure: ANGIOGRAM, CORONARY ARTERY;  Surgeon:  Raji Noble MD;  Location: [facility] CATH LAB/EP;  Service: Cardiology;  Laterality: N/A;    EYE SURGERY  2017    Cararact surgery    FOOT SURGERY Left 05/2017    subtalar implant, 6 weeks later removed due to failure    FOOT SURGERY Left 05/19/2022    HERNIA REPAIR  1963?    DOUBLE age 8    IVUS, CORONARY  06/04/2024    Procedure: IVUS, Coronary;  Surgeon: Raji Noble MD;  Location: [facility] CATH LAB/EP;  Service: Cardiology;;    JOINT REPLACEMENT  2009    Bilateral knee replacement    KNEE SURGERY Bilateral 05/2011    TKR, had ligament replacement in right , meniscus repair both knees    LAMINECTOMY  1989    spinal    LEFT HEART CATHETERIZATION Left 06/04/2024    Procedure: Left heart cath;  Surgeon: Raji Noble MD;  Location: [facility] CATH LAB/EP;  Service: Cardiology;  Laterality: Left;    PERCUTANEOUS CORONARY INTERVENTION, ARTERY N/A 06/04/2024    Procedure: Percutaneous coronary intervention;  Surgeon: Raji Noble MD;  Location: [facility] CATH LAB/EP;  Service: Cardiology;  Laterality: N/A;    PTCA, SINGLE VESSEL  06/04/2024    Procedure: PTCA, Single Vessel;  Surgeon: Raji Noble MD;  Location: [facility] CATH LAB/EP;  Service: Cardiology;;    SINUS SURGERY  2012?    Balloon sinuplasty    SPINE SURGERY  1991    Triple laminectomy    THYROIDECTOMY Bilateral 11/15/2024    Procedure: THYROIDECTOMY, TOTAL;  Surgeon: Vlad Avelar MD;  Location: [address];  Service: ENT;  Laterality: Bilateral;  Nerve monitoring booked at 7am on 11/15 (CL 10/21).    TONSILLECTOMY  2001    UPPP surgery    UVULOPALATOPHARYNGOPLASTY  2007    VASECTOMY  1985        Family History  His family history includes Arthritis in his mother; Asthma in his father; Cancer in his maternal grandfather and maternal grandmother; Drug abuse in his brother, brother, brother, and brother; Heart disease in his father; Heart failure in his brother and father; Migraines in his mother; Osteoarthritis in his mother; Stroke in his  "mother.    Social History  He reports that he has never smoked. He has never used smokeless tobacco. He reports current alcohol use of about 11.0 standard drinks of alcohol per week. He reports that he does not use drugs.    Allergies  He is allergic to hay fever and allergy relief.    Medications  He has a current medication list which includes the following prescription(s): alpha lipoic acid, amlodipine, aspirin, clopidogrel, coq10 (ubiquinol), finasteride, fluticasone propionate, levothyroxine, losartan-hydrochlorothiazide 100-12.5 mg, metformin, mirabegron, multivit with minerals/lutein, fish oil-omega-3 fatty acids, rosuvastatin, and nitroglycerin, and the following Facility-Administered Medications: sodium chloride 0.9%.    ROS:  Pertinent positive and negative review of systems as noted in HPI.     Objective:     /84 (BP Location: Left arm, Patient Position: Sitting)   Pulse 65   Ht 6' 1" (1.854 m)   Wt 108.9 kg (240 lb)   BMI 31.66 kg/m²    Constitutional: Well appearing, communicating. No acute distress  Voice: Euphonic  Head/Face:   House Brackmann I Bilaterally  No masses on palption  Nose:    Septum Midline    mild edematous turbinate hypertrophy   no rhinorrhea present   External Valve Collapse: none   Modified [place]: DNT   Sinus pressure: not present  Oral Cavity   Dentition: good    Occlusion: class 2   ALEX: >40 mm, no trismus   Hard Palate: WNL   No tongue atrophy, symmetric protrusion  Oropharynx:   S/p UPPP, c/f decreased AP diameter of retropalatal space   Chand Tongue Position: 3 (partial obstruction soft palate)   No pharyngeal erythema   Symmetric Palate elevation  Neck   Hyomental Distance: 4 fingerbreadth  Submandibular glands not enlarged, symmetric, and ptotic   Cervical lymph nodes not palpable   Thyroid gland not enlarged and symmetric   Trachea midline   Laryngeal landmarks palpable   ROM: intact  Chest   No scars, no other implants   Soft tissue bulk: " Moderate  Neuro/Psychiatric:     Affect: Appropriate  Respiratory:   No increased WOB  No stridor       Data Review:   MEDICAL RECORDS    I have reviewed the following medical records relevant to the care of this patient from unique sources outside of my institution or departmental specialty:  Cardiology and Sleep Medicine    LABS  INR (no units)   Date Value   10/10/2005 0.9     aPTT (sec)   Date Value   10/10/2005 30.9     Platelet Count (K/uL)   Date Value   04/17/2025 228     Platelets (K/uL)   Date Value   11/16/2024 236     ,   WBC (K/uL)   Date Value   04/17/2025 4.96     Eos # (K/uL)   Date Value   04/17/2025 0.19   11/16/2024 0.0     Eos % (%)   Date Value   04/17/2025 3.8     Platelet Count (K/uL)   Date Value   04/17/2025 228     Platelets (K/uL)   Date Value   11/16/2024 236     Hemoglobin A1C (%)   Date Value   11/05/2024 6.0 (H)     Hemoglobin A1c (%)   Date Value   04/17/2025 5.8 (H)        I have independently reviewed the lab results shown above. Findings significant for the conditions being treated include: A1c <8, no marked eosinophilia    IMAGING    No pertinent imaging available    SLEEP STUDY    Sleep Study  See above    Procedures:       Assessment:     1. LACHO (obstructive sleep apnea)    2. Insomnia due to medical condition    3. Nasal polyps      Plan:     I had a long discussion with the patient regarding his condition and the further workup and management options. Patient appears to have a very narrow retropalatal space secondary to contraction of UPPP, which could be complicating his PAP therapy. Discussed a drug induced sleep endoscopy to better characterize his collapse pattern.    Spent a significant amount of time discussing treatment options for PAP noncompliant LACHO. Overall he has good outcomes with PAP therapy but is frustrated with the mask tightness, leak, and portability.     CT sinus to evaluate for nasal polyps. Consider titers. Maximum medical therapy of INS, NSI, and  azelastine.     RTC follow-up above workup to discuss options.     The total time spent for this visit exceeds 40 minutes. This includes times spent reviewing the medical record, gather medical history, in-person examination, education, discussion of therapeutic options, documentation, order placement, and coordination of post-visit care. This does not include time spent doing separately billed procedures. This only includes time spent on the day of the encounter.     Voice recognition software was used in the creation of this note/communication and any sound-alike errors which may have occurred from its use should be taken in context when interpreting. If such errors prevent a clear understanding of the note/communication, please contact the office for clarification.     Problem List Items Addressed This Visit       Insomnia     Other Visit Diagnoses         LACHO (obstructive sleep apnea)    -  Primary      Nasal polyps

## 2025-05-21 ENCOUNTER — HOSPITAL ENCOUNTER (OUTPATIENT)
Dept: RADIOLOGY | Facility: HOSPITAL | Age: 70
Discharge: HOME | End: 2025-05-21
Attending: STUDENT IN AN ORGANIZED HEALTH CARE EDUCATION/TRAINING PROGRAM
Payer: MEDICARE

## 2025-05-21 ENCOUNTER — TELEPHONE (OUTPATIENT)
Dept: OTOLARYNGOLOGY | Facility: CLINIC | Age: 70
End: 2025-05-21
Payer: MEDICARE

## 2025-05-21 DIAGNOSIS — G47.33 OSA (OBSTRUCTIVE SLEEP APNEA): Primary | ICD-10-CM

## 2025-05-21 DIAGNOSIS — J33.8 OTHER POLYP OF SINUS: ICD-10-CM

## 2025-05-21 DIAGNOSIS — J33.9 NASAL POLYPS: ICD-10-CM

## 2025-05-21 DIAGNOSIS — G47.01 INSOMNIA DUE TO MEDICAL CONDITION: ICD-10-CM

## 2025-05-21 PROCEDURE — 70486 CT MAXILLOFACIAL W/O DYE: CPT | Mod: 26,,, | Performed by: RADIOLOGY

## 2025-05-21 PROCEDURE — 70486 CT MAXILLOFACIAL W/O DYE: CPT | Mod: TC

## 2025-05-22 DIAGNOSIS — E11.9 CONTROLLED TYPE 2 DIABETES MELLITUS WITHOUT COMPLICATION, WITHOUT LONG-TERM CURRENT USE OF INSULIN: ICD-10-CM

## 2025-05-22 RX ORDER — METFORMIN HYDROCHLORIDE 1000 MG/1
1000 TABLET ORAL 2 TIMES DAILY WITH MEALS
Qty: 180 TABLET | Refills: 1 | Status: SHIPPED | OUTPATIENT
Start: 2025-05-22

## 2025-05-22 NOTE — TELEPHONE ENCOUNTER
No care due was identified.  [facility] Embedded Care Due Messages. Reference number: [id number].   5/22/2025 2:41:51 PM CDT

## 2025-05-27 ENCOUNTER — RESULTS FOLLOW-UP (OUTPATIENT)
Dept: OTOLARYNGOLOGY | Facility: CLINIC | Age: 70
End: 2025-05-27

## 2025-06-01 ENCOUNTER — PATIENT MESSAGE (OUTPATIENT)
Dept: PRIMARY CARE CLINIC | Facility: CLINIC | Age: 70
End: 2025-06-01
Payer: MEDICARE

## 2025-06-02 RX ORDER — LOSARTAN POTASSIUM AND HYDROCHLOROTHIAZIDE 12.5; 1 MG/1; MG/1
1 TABLET ORAL DAILY
Qty: 90 TABLET | Refills: 2 | OUTPATIENT
Start: 2025-06-02

## 2025-06-02 RX ORDER — FINASTERIDE 5 MG/1
5 TABLET, FILM COATED ORAL
Qty: 90 TABLET | Refills: 3 | Status: SHIPPED | OUTPATIENT
Start: 2025-06-02

## 2025-06-02 NOTE — TELEPHONE ENCOUNTER
Pt requesting a refill on Hyzaar, reports upcoming trip out of town 6/3 and will run out of medication    LOV with Soraida Lowry MD , 4/7/2025 [facility] care

## 2025-06-04 ENCOUNTER — PATIENT MESSAGE (OUTPATIENT)
Dept: OTOLARYNGOLOGY | Facility: CLINIC | Age: 70
End: 2025-06-04
Payer: MEDICARE

## 2025-06-05 RX ORDER — LOSARTAN POTASSIUM AND HYDROCHLOROTHIAZIDE 12.5; 1 MG/1; MG/1
1 TABLET ORAL DAILY
Qty: 90 TABLET | Refills: 2 | Status: SHIPPED | OUTPATIENT
Start: 2025-06-05

## 2025-06-18 DIAGNOSIS — Z95.5 STENTED CORONARY ARTERY: ICD-10-CM

## 2025-06-18 RX ORDER — NITROGLYCERIN 0.4 MG/1
TABLET SUBLINGUAL
Qty: 25 TABLET | Refills: 3 | Status: SHIPPED | OUTPATIENT
Start: 2025-06-18

## 2025-06-18 RX ORDER — CREATINE 100 %
5 POWDER (GRAM) MISCELLANEOUS DAILY
COMMUNITY
Start: 2025-05-01

## 2025-06-18 NOTE — PRE-PROCEDURE INSTRUCTIONS
Information to Prepare you for your Surgery    PRE-ADMIT TESTING -  [phone]    [address]  [facility]          Your surgery has been scheduled at [facility]. We are pleased to have the opportunity to serve you. For Further Information please call [phone].    On the day of surgery please report to the Information Desk on the 1st floor.    CONTACT YOUR PHYSICIAN'S OFFICE THE DAY PRIOR TO YOUR SURGERY TO OBTAIN YOUR ARRIVAL TIME.     The evening before surgery do not eat anything after 9 p.m. ( this includes hard candy, chewing gum and mints).  You may only have GATORADE, POWERADE AND WATER  from 9 p.m. until you leave your home.   DO NOT DRINK ANY LIQUIDS ON THE WAY TO THE HOSPITAL.      Why does your anesthesiologist allow you to drink Gatorade/Powerade before surgery?  Gatorade/Powerade helps to increase your comfort before surgery and to decrease your nausea after surgery. The carbohydrates in Gatorade/Powerade help reduce your body's stress response to surgery.  If you are a diabetic-drink only water prior to surgery.    Outpatient Surgery- May allow 2 adult (18 and older) Support Persons (1 being the designated ) for all surgical/procedural patients. A breastfeeding mother will be allowed her infant and 2 adult Support Persons. No one under the age of 18 will be allowed in the building.       MEDICATION INSTRUCTIONS:     Take the below medicines morning of surgery:    Amlodipine  Levothyroxine  Losartan-HCTZ     If you take ASPIRIN - Your PHYSICIAN/SURGEON will inform you IF/OR when you need to stop taking aspirin prior to your surgery.     The week prior to surgery do not ot take any medications containing IBUPROFEN or NSAIDS ( Advil, Motrin, Goodys, BC, Aleve, Naproxen etc) If you are not sure if you should take a medicine please call your surgeon's office.  Ok to take Tylenol    Do Not Wear any make-up (especially eye make-up) to surgery. Please remove any  false eyelashes or eyelash extensions. If you arrive the day of surgery with makeup/eyelashes on you will be required to remove prior to surgery. (There is a risk of corneal abrasions if eye makeup/eyelash extensions are not removed)      Leave all valuables at home.   Do Not wear any jewelry or watches, including any metal in body piercings. Jewelry must be removed prior to coming to the hospital.  There is a possibility that rings that are unable to be removed may be cut off if they are on the surgical extremity.    Please remove all hair extensions, wigs, clips and any other metal accessories/ ornaments from your hair.  These items may pose a flammable/fire risk in Surgery and must be removed.    Do not shave your surgical area at least 5 days prior to your surgery. The surgical prep will be performed at the hospital according to Infection Control regulations.    Contact Lens must be removed before surgery. Either do not wear the contact lens or bring a case and solution for storage.  Please bring a container for eyeglasses or dentures as required.  Bring any paperwork your physician has provided, such as consent forms,  history and physicals, doctor's orders, etc.   Bring comfortable clothes that are loose fitting to wear upon discharge. Take into consideration the type of surgery being performed.  Maintain your diet as advised per your physician the day prior to surgery.      Adequate rest the night before surgery is advised.   Park in the Parking lot behind the hospital or in the [place] [facility] across the street from the parking lot. Parking is complimentary.  If you will be discharged the same day as your procedure, please arrange for a responsible adult to drive you home or to accompany you if traveling by taxi.   YOU WILL NOT BE PERMITTED TO DRIVE OR TO LEAVE THE HOSPITAL ALONE AFTER SURGERY.   If you are being discharged the same day, it is strongly recommended that you arrange for someone to  remain with you for the first 24 hrs following your surgery.    The Surgeon will speak to your family/visitor after your surgery regarding the outcome of your surgery and post op care.  The Surgeon may speak to you after your surgery, but there is a possibility you may not remember the details.  Please check with your family members regarding the conversation with the Surgeon.    We strongly recommend whoever is bringing you home be present for discharge instructions.  This will ensure a thorough understanding for your post op home care.    If the patient has fever, cough, or signs/symptoms of Flu or Covid please do not come in for your surgery. Contact your surgeon and your primary care physician for further instructions.           Thank you for your cooperation.  The Staff of [facility].            Bathing Instructions with Hibiclens    Shower the evening before and morning of your procedure with Chlorhexidine (Hibiclens)  do not use Chlorhexidine on your face or genitals. Do not get in your eyes.  Wash your face with water and your regular face wash/soap  Use your regular shampoo  Apply Chlorhexidine (Hibiclens) directly on your skin or on a wet washcloth and wash gently. When showering: Move away from the shower stream when applying Chlorhexidine (Hibiclens) to avoid rinsing off too soon.  Rinse thoroughly with warm water  Do not dilute Chlorhexidine (Hibiclens)   Dry off as usual, do not use any deodorant, powder, body lotions, perfume, after shave or cologne.

## 2025-06-19 ENCOUNTER — PATIENT MESSAGE (OUTPATIENT)
Dept: OTOLARYNGOLOGY | Facility: CLINIC | Age: 70
End: 2025-06-19
Payer: MEDICARE

## 2025-06-23 ENCOUNTER — TELEPHONE (OUTPATIENT)
Dept: OTOLARYNGOLOGY | Facility: CLINIC | Age: 70
End: 2025-06-23
Payer: MEDICARE

## 2025-06-23 ENCOUNTER — ANESTHESIA EVENT (OUTPATIENT)
Dept: SURGERY | Facility: OTHER | Age: 70
End: 2025-06-23
Payer: MEDICARE

## 2025-06-24 ENCOUNTER — HOSPITAL ENCOUNTER (OUTPATIENT)
Facility: OTHER | Age: 70
Discharge: HOME | End: 2025-06-24
Attending: STUDENT IN AN ORGANIZED HEALTH CARE EDUCATION/TRAINING PROGRAM | Admitting: STUDENT IN AN ORGANIZED HEALTH CARE EDUCATION/TRAINING PROGRAM
Payer: MEDICARE

## 2025-06-24 ENCOUNTER — ANESTHESIA (OUTPATIENT)
Dept: SURGERY | Facility: OTHER | Age: 70
End: 2025-06-24
Payer: MEDICARE

## 2025-06-24 VITALS
TEMPERATURE: 48 F | DIASTOLIC BLOOD PRESSURE: 60 MMHG | HEART RATE: 50 BPM | SYSTOLIC BLOOD PRESSURE: 112 MMHG | RESPIRATION RATE: 16 BRPM | BODY MASS INDEX: 31.15 KG/M2 | HEIGHT: 72 IN | WEIGHT: 230 LBS | OXYGEN SATURATION: 93 %

## 2025-06-24 DIAGNOSIS — G47.33 OBSTRUCTIVE SLEEP APNEA: Primary | ICD-10-CM

## 2025-06-24 LAB
GLUCOSE SERPL-MCNC: 113 MG/DL (ref 70–110)
POCT GLUCOSE: 113 MG/DL (ref 70–110)

## 2025-06-24 PROCEDURE — 25000003 PHARM REV CODE 250: Performed by: NURSE ANESTHETIST, CERTIFIED REGISTERED

## 2025-06-24 PROCEDURE — 37000008 HC ANESTHESIA 1ST 15 MINUTES: Performed by: STUDENT IN AN ORGANIZED HEALTH CARE EDUCATION/TRAINING PROGRAM

## 2025-06-24 PROCEDURE — 37000009 HC ANESTHESIA EA ADD 15 MINS: Performed by: STUDENT IN AN ORGANIZED HEALTH CARE EDUCATION/TRAINING PROGRAM

## 2025-06-24 PROCEDURE — 71000033 HC RECOVERY, INTIAL HOUR: Performed by: STUDENT IN AN ORGANIZED HEALTH CARE EDUCATION/TRAINING PROGRAM

## 2025-06-24 PROCEDURE — 63600175 PHARM REV CODE 636 W HCPCS

## 2025-06-24 PROCEDURE — 71000039 HC RECOVERY, EACH ADD'L HOUR: Performed by: STUDENT IN AN ORGANIZED HEALTH CARE EDUCATION/TRAINING PROGRAM

## 2025-06-24 PROCEDURE — 71000015 HC POSTOP RECOV 1ST HR: Performed by: STUDENT IN AN ORGANIZED HEALTH CARE EDUCATION/TRAINING PROGRAM

## 2025-06-24 PROCEDURE — 36000708 HC OR TIME LEV III 1ST 15 MIN: Performed by: STUDENT IN AN ORGANIZED HEALTH CARE EDUCATION/TRAINING PROGRAM

## 2025-06-24 PROCEDURE — 25000003 PHARM REV CODE 250: Performed by: STUDENT IN AN ORGANIZED HEALTH CARE EDUCATION/TRAINING PROGRAM

## 2025-06-24 PROCEDURE — 71000016 HC POSTOP RECOV ADDL HR: Performed by: STUDENT IN AN ORGANIZED HEALTH CARE EDUCATION/TRAINING PROGRAM

## 2025-06-24 PROCEDURE — 82962 GLUCOSE BLOOD TEST: CPT | Performed by: STUDENT IN AN ORGANIZED HEALTH CARE EDUCATION/TRAINING PROGRAM

## 2025-06-24 PROCEDURE — 36000709 HC OR TIME LEV III EA ADD 15 MIN: Performed by: STUDENT IN AN ORGANIZED HEALTH CARE EDUCATION/TRAINING PROGRAM

## 2025-06-24 PROCEDURE — 63600175 PHARM REV CODE 636 W HCPCS: Performed by: NURSE ANESTHETIST, CERTIFIED REGISTERED

## 2025-06-24 PROCEDURE — 42975 DISE EVAL SLP DO BRTH FLX DX: CPT | Mod: ,,, | Performed by: STUDENT IN AN ORGANIZED HEALTH CARE EDUCATION/TRAINING PROGRAM

## 2025-06-24 RX ORDER — HYDROMORPHONE HYDROCHLORIDE 2 MG/ML
0.4 INJECTION, SOLUTION INTRAMUSCULAR; INTRAVENOUS; SUBCUTANEOUS EVERY 5 MIN PRN
Status: DISCONTINUED | OUTPATIENT
Start: 2025-06-24 | End: 2025-06-24 | Stop reason: HOSPADM

## 2025-06-24 RX ORDER — SODIUM CHLORIDE, SODIUM LACTATE, POTASSIUM CHLORIDE, CALCIUM CHLORIDE 600; 310; 30; 20 MG/100ML; MG/100ML; MG/100ML; MG/100ML
INJECTION, SOLUTION INTRAVENOUS CONTINUOUS
Status: DISCONTINUED | OUTPATIENT
Start: 2025-06-24 | End: 2025-06-24 | Stop reason: HOSPADM

## 2025-06-24 RX ORDER — ONDANSETRON 4 MG/1
4 TABLET, ORALLY DISINTEGRATING ORAL EVERY 6 HOURS PRN
Qty: 20 TABLET | Refills: 0 | Status: SHIPPED | OUTPATIENT
Start: 2025-06-24

## 2025-06-24 RX ORDER — SODIUM CHLORIDE 0.9 % (FLUSH) 0.9 %
2 SYRINGE (ML) INJECTION
Status: DISCONTINUED | OUTPATIENT
Start: 2025-06-24 | End: 2025-06-24 | Stop reason: HOSPADM

## 2025-06-24 RX ORDER — LIDOCAINE HYDROCHLORIDE 10 MG/ML
1 INJECTION, SOLUTION EPIDURAL; INFILTRATION; INTRACAUDAL; PERINEURAL ONCE
Status: DISCONTINUED | OUTPATIENT
Start: 2025-06-24 | End: 2025-06-24 | Stop reason: HOSPADM

## 2025-06-24 RX ORDER — DEXMEDETOMIDINE HYDROCHLORIDE 100 UG/ML
INJECTION, SOLUTION INTRAVENOUS
Status: DISCONTINUED | OUTPATIENT
Start: 2025-06-24 | End: 2025-06-24

## 2025-06-24 RX ORDER — OXYCODONE HYDROCHLORIDE 5 MG/1
5 TABLET ORAL
Status: DISCONTINUED | OUTPATIENT
Start: 2025-06-24 | End: 2025-06-24 | Stop reason: HOSPADM

## 2025-06-24 RX ORDER — PROCHLORPERAZINE EDISYLATE 5 MG/ML
5 INJECTION INTRAMUSCULAR; INTRAVENOUS EVERY 30 MIN PRN
Status: DISCONTINUED | OUTPATIENT
Start: 2025-06-24 | End: 2025-06-24 | Stop reason: HOSPADM

## 2025-06-24 RX ORDER — LIDOCAINE HYDROCHLORIDE 20 MG/ML
JELLY TOPICAL
Status: DISCONTINUED | OUTPATIENT
Start: 2025-06-24 | End: 2025-06-24 | Stop reason: HOSPADM

## 2025-06-24 RX ORDER — OXYMETAZOLINE HCL 0.05 %
3 SPRAY, NON-AEROSOL (ML) NASAL ONCE
Status: COMPLETED | OUTPATIENT
Start: 2025-06-24 | End: 2025-06-24

## 2025-06-24 RX ORDER — GLUCAGON 1 MG
1 KIT INJECTION
Status: DISCONTINUED | OUTPATIENT
Start: 2025-06-24 | End: 2025-06-24 | Stop reason: HOSPADM

## 2025-06-24 RX ORDER — SODIUM CHLORIDE 0.9 % (FLUSH) 0.9 %
3 SYRINGE (ML) INJECTION
Status: DISCONTINUED | OUTPATIENT
Start: 2025-06-24 | End: 2025-06-24 | Stop reason: HOSPADM

## 2025-06-24 RX ORDER — LIDOCAINE HYDROCHLORIDE 10 MG/ML
0.5 INJECTION, SOLUTION EPIDURAL; INFILTRATION; INTRACAUDAL; PERINEURAL ONCE
Status: DISCONTINUED | OUTPATIENT
Start: 2025-06-24 | End: 2025-06-24 | Stop reason: HOSPADM

## 2025-06-24 RX ADMIN — DEXMEDETOMIDINE HYDROCHLORIDE 1.5 MCG/KG/HR: 100 INJECTION, SOLUTION, CONCENTRATE INTRAVENOUS at 07:06

## 2025-06-24 RX ADMIN — GLYCOPYRROLATE 0.2 MG: 0.2 INJECTION, SOLUTION INTRAMUSCULAR; INTRAVITREAL at 06:06

## 2025-06-24 RX ADMIN — OXYMETAZOLINE HYDROCHLORIDE 3 SPRAY: 0.5 SPRAY NASAL at 05:06

## 2025-06-24 RX ADMIN — SODIUM CHLORIDE, SODIUM LACTATE, POTASSIUM CHLORIDE, AND CALCIUM CHLORIDE: 600; 310; 30; 20 INJECTION, SOLUTION INTRAVENOUS at 06:06

## 2025-06-24 RX ADMIN — DEXMEDETOMIDINE HYDROCHLORIDE 156 MCG: 100 INJECTION, SOLUTION, CONCENTRATE INTRAVENOUS at 07:06

## 2025-06-24 NOTE — H&P
The patient has been examined and the H&P has been reviewed:    I concur with the findings and no changes have occurred since H&P was written.    Anesthesia/Surgery risks, benefits and alternative options discussed and understood by patient/family.       Ear, Nose, & Throat  Otolaryngology - Head & Neck Surgery           Subjective:      Chief Complaint: LACHO     Yuriy Jerome is a 69 y.o. male who was referred to me by Dr Lowry in consultation for LACHO.     Long-time history of LACHO with CPAP/BiPAP issues. Has recently transitioned to BiPAP. Last saw Alison Samano 2/2025. Issues initiating sleep.      Issues with urinary incontinence. Sees urology. Had a procedure scheduled with urology but had unstable angina in 2024 s/p PCI to LAD. ASA 81 and plavix.      Currently on BiPAP.      Sleep Study  Type: in-lab  Date: 3/17/2025  AHI: 96  RDI: 100  SANDY: 0  T90: 3.4%  O2 Americo: 87%  Comments:         BMI: 31.66  Neck Circumference: -     Trial of PAP therapy? Yes  Masks Tried: oronasal and nasal mask  Outcome: nonadherence, issues with mask leak, dry mouth, tight mask fit  Machine Returned? Yes  Other Therapies Trialed: none  ESS: 6  ARAVIND: 13 (2 2 2)              -     Smoker or former smoker? No  Do you drink more than 5 alcoholic beverages a week? Yes - wine  Do you take any medication that cause drowsiness? No  Do you take any medications specifically for sleep? No  Stimulants? sudafed     Nasal history: Chronic sinusitis, allergic rhinitis, nasal polyps  Nasal surgeries: Septoplasty, Balloon Sinuplasty  Current sinonasal meds: Flonase, sudafed prn  Last sinus infection:   NOSE score: 3 3 3 3 3     Prior airway surgeries? UPPP ([facility])  Prior head/neck/chest surgeries? Thyroidectomy (PTC - [facility]) followed by [facility]  Previous radiation to head/neck/chest? none  Comorbid Conditions: CAD, HTN, T2DM  Existing implants: None  Anticoagulation: Plavix & Aspirin     Past Medical History       Active Ambulatory Problems      Diagnosis Date Noted    Obstructive sleep apnea 05/26/2016    Insomnia 08/24/2018    Hyperlipidemia 12/15/2021    Essential hypertension 12/15/2021    Benign prostatic hyperplasia 12/15/2021    Obesity (BMI 35.0-39.9 without comorbidity) 12/15/2021    Type 2 diabetes mellitus with other specified complication, without long-term current use of insulin      Subluxation of tarsal joint of left foot 05/19/2022    Morbid (severe) obesity due to excess calories 09/11/2023    Stented coronary artery 06/12/2024    Thyroid nodule 06/20/2024    Coronary atherosclerosis of native coronary artery 08/14/2024    Papillary thyroid carcinoma 12/13/2024    Postoperative hypothyroidism 12/13/2024           Resolved Ambulatory Problems     Diagnosis Date Noted    No Resolved Ambulatory Problems           Past Medical History:   Diagnosis Date    Allergy      Asthma 2000    Cataract 2018    Diabetes mellitus 2022    Hearing loss 2023    Hypertension 2014?    Migraine headache 1985?    Obesity 2014?    Osteoarthritis 1990?    Seasonal allergies 1981?    Sleep apnea 1990?    Staphylococcal infection           Past Surgical History  He has a past surgical history that includes Laminectomy (1989); Hernia repair (1963?); Knee surgery (Bilateral, 05/2011); Foot surgery (Left, 05/2017); Uvulopalatopharyngoplasty (2007); Foot surgery (Left, 05/19/2022); Biceps tendon repair (Left, 12/28/2021); Adenoidectomy (2001); Appendectomy (2005); Eye surgery (2017); Spine surgery (1991); Tonsillectomy (2001); Joint replacement (2009); Vasectomy (1985); Left heart catheterization (Left, 06/04/2024); Coronary angiography (N/A, 06/04/2024); ptca, single vessel (06/04/2024); percutaneous coronary intervention, artery (N/A, 06/04/2024); ivus, coronary (06/04/2024); Thyroidectomy (Bilateral, 11/15/2024); Sinus surgery (2012?); Cardiac surgery (6/4/24); and Biceps tendon repair (Right, 2025).           Past Surgical History:   Procedure Laterality Date     ADENOIDECTOMY   2001     UP surgery    APPENDECTOMY   2005    BICEPS TENDON REPAIR Left 12/28/2021    BICEPS TENDON REPAIR Right 2025    CARDIAC SURGERY   6/4/24     Angiogram, balloon angioplasty, stents placed in LAD    CORONARY ANGIOGRAPHY N/A 06/04/2024     Procedure: ANGIOGRAM, CORONARY ARTERY;  Surgeon: Raji Noble MD;  Location: [facility] CATH LAB/EP;  Service: Cardiology;  Laterality: N/A;    EYE SURGERY   2017     Cararact surgery    FOOT SURGERY Left 05/2017     subtalar implant, 6 weeks later removed due to failure    FOOT SURGERY Left 05/19/2022    HERNIA REPAIR   1963?     DOUBLE age 8    IVUS, CORONARY   06/04/2024     Procedure: IVUS, Coronary;  Surgeon: Raji Noble MD;  Location: [facility] CATH LAB/EP;  Service: Cardiology;;    JOINT REPLACEMENT   2009     Bilateral knee replacement    KNEE SURGERY Bilateral 05/2011     TKR, had ligament replacement in right , meniscus repair both knees    LAMINECTOMY   1989     spinal    LEFT HEART CATHETERIZATION Left 06/04/2024     Procedure: Left heart cath;  Surgeon: Raji Noble MD;  Location: [facility] CATH LAB/EP;  Service: Cardiology;  Laterality: Left;    PERCUTANEOUS CORONARY INTERVENTION, ARTERY N/A 06/04/2024     Procedure: Percutaneous coronary intervention;  Surgeon: Raji Noble MD;  Location: [facility] CATH LAB/EP;  Service: Cardiology;  Laterality: N/A;    PTCA, SINGLE VESSEL   06/04/2024     Procedure: PTCA, Single Vessel;  Surgeon: Raji Noble MD;  Location: [facility] CATH LAB/EP;  Service: Cardiology;;    SINUS SURGERY   2012?     Balloon sinuplasty    SPINE SURGERY   1991     Triple laminectomy    THYROIDECTOMY Bilateral 11/15/2024     Procedure: THYROIDECTOMY, TOTAL;  Surgeon: Vlad Avelar MD;  Location: [address];  Service: ENT;  Laterality: Bilateral;  Nerve monitoring booked at 7am on 11/15 (CL 10/21).    TONSILLECTOMY   2001     UP surgery    UVULOPALATOPHARYNGOPLASTY   2007    VASECTOMY   1985         Family  "History  His family history includes Arthritis in his mother; Asthma in his father; Cancer in his maternal grandfather and maternal grandmother; Drug abuse in his brother, brother, brother, and brother; Heart disease in his father; Heart failure in his brother and father; Migraines in his mother; Osteoarthritis in his mother; Stroke in his mother.     Social History  He reports that he has never smoked. He has never used smokeless tobacco. He reports current alcohol use of about 11.0 standard drinks of alcohol per week. He reports that he does not use drugs.     Allergies  He is allergic to hay fever and allergy relief.     Medications  He has a current medication list which includes the following prescription(s): alpha lipoic acid, amlodipine, aspirin, clopidogrel, coq10 (ubiquinol), finasteride, fluticasone propionate, levothyroxine, losartan-hydrochlorothiazide 100-12.5 mg, metformin, mirabegron, multivit with minerals/lutein, fish oil-omega-3 fatty acids, rosuvastatin, and nitroglycerin, and the following Facility-Administered Medications: sodium chloride 0.9%.     ROS:  Pertinent positive and negative review of systems as noted in HPI.      Objective:      /84 (BP Location: Left arm, Patient Position: Sitting)   Pulse 65   Ht 6' 1" (1.854 m)   Wt 108.9 kg (240 lb)   BMI 31.66 kg/m²    Constitutional: Well appearing, communicating. No acute distress  Voice: Euphonic  Head/Face:   House Brackmann I Bilaterally  No masses on palption  Nose:               Septum Midline               mild edematous turbinate hypertrophy              no rhinorrhea present              External Valve Collapse: none              Modified [place]: DNT              Sinus pressure: not present  Oral Cavity              Dentition: good               Occlusion: class 2              ALEX: >40 mm, no trismus              Hard Palate: WNL              No tongue atrophy, symmetric protrusion  Oropharynx:              S/p UPPP, c/f " decreased AP diameter of retropalatal space              Chand Tongue Position: 3 (partial obstruction soft palate)              No pharyngeal erythema              Symmetric Palate elevation  Neck              Hyomental Distance: 4 fingerbreadth  Submandibular glands not enlarged, symmetric, and ptotic              Cervical lymph nodes not palpable              Thyroid gland not enlarged and symmetric              Trachea midline              Laryngeal landmarks palpable              ROM: intact  Chest              No scars, no other implants              Soft tissue bulk: Moderate  Neuro/Psychiatric:                Affect: Appropriate  Respiratory:   No increased WOB  No stridor         Data Review:   MEDICAL RECORDS     I have reviewed the following medical records relevant to the care of this patient from unique sources outside of my institution or departmental specialty:  Cardiology and Sleep Medicine     LABS      INR (no units)   Date Value   10/10/2005 0.9          aPTT (sec)   Date Value   10/10/2005 30.9          Platelet Count (K/uL)   Date Value   04/17/2025 228          Platelets (K/uL)   Date Value   11/16/2024 236      ,       WBC (K/uL)   Date Value   04/17/2025 4.96          Eos # (K/uL)   Date Value   04/17/2025 0.19   11/16/2024 0.0          Eos % (%)   Date Value   04/17/2025 3.8          Platelet Count (K/uL)   Date Value   04/17/2025 228          Platelets (K/uL)   Date Value   11/16/2024 236          Hemoglobin A1C (%)   Date Value   11/05/2024 6.0 (H)          Hemoglobin A1c (%)   Date Value   04/17/2025 5.8 (H)         I have independently reviewed the lab results shown above. Findings significant for the conditions being treated include: A1c <8, no marked eosinophilia     IMAGING     No pertinent imaging available     SLEEP STUDY     Sleep Study  See above     Procedures:         Assessment:      1. LACHO (obstructive sleep apnea)    2. Insomnia due to medical condition    3. Nasal polyps        Plan:      I had a long discussion with the patient regarding his condition and the further workup and management options. Patient appears to have a very narrow retropalatal space secondary to contraction of UPPP, which could be complicating his PAP therapy. Discussed a drug induced sleep endoscopy to better characterize his collapse pattern.     Spent a significant amount of time discussing treatment options for PAP noncompliant LACHO. Overall he has good outcomes with PAP therapy but is frustrated with the mask tightness, leak, and portability.      CT sinus to evaluate for nasal polyps. Consider titers. Maximum medical therapy of INS, NSI, and azelastine.      RTC follow-up above workup to discuss options.      The total time spent for this visit exceeds 40 minutes. This includes times spent reviewing the medical record, gather medical history, in-person examination, education, discussion of therapeutic options, documentation, order placement, and coordination of post-visit care. This does not include time spent doing separately billed procedures. This only includes time spent on the day of the encounter.      Voice recognition software was used in the creation of this note/communication and any sound-alike errors which may have occurred from its use should be taken in context when interpreting. If such errors prevent a clear understanding of the note/communication, please contact the office for clarification.      Problem List Items Addressed This Visit         Insomnia      Other Visit Diagnoses           LACHO (obstructive sleep apnea)    -  Primary       Nasal polyps                     Electronically signed by Patrick Lane MD at 5/18/2025 10:07 AM  Electronically signed by Patrick Lane MD at 5/19/2025  9:35 AM  Electronically signed by Patrick Lane MD at 5/19/2025  9:36 AM

## 2025-06-24 NOTE — ANESTHESIA PREPROCEDURE EVALUATION
06/24/2025  Yuriy Jerome is a 69 y.o., male.      Pre-op Assessment    I have reviewed the Patient Summary Reports.     I have reviewed the Nursing Notes. I have reviewed the NPO Status.   I have reviewed the Medications.     Review of Systems  Anesthesia Hx:  No problems with previous Anesthesia                Social:  Non-Smoker       Hematology/Oncology:    Oncology Normal                                   EENT/Dental:  EENT/Dental Normal           Cardiovascular:     Hypertension, well controlled   CAD                                          Pulmonary:    Asthma mild   Sleep Apnea                Renal/:  Renal/ Normal                 Hepatic/GI:  Hepatic/GI Normal                    Musculoskeletal:  Arthritis               Neurological:      Headaches                                 Endocrine:  Diabetes, well controlled Hypothyroidism        Obesity / BMI > 30      Physical Exam  General: Cooperative and Alert    Airway:  Mallampati: II   Mouth Opening: Normal  TM Distance: Normal  Tongue: Normal  Neck ROM: Normal ROM    Dental:  Intact        Anesthesia Plan  Type of Anesthesia, risks & benefits discussed:    Anesthesia Type: MAC  Intra-op Monitoring Plan: Standard ASA Monitors  Post Op Pain Control Plan: multimodal analgesia  Induction:  IV  Informed Consent: Informed consent signed with the Patient and all parties understand the risks and agree with anesthesia plan.  All questions answered.   ASA Score: 3    Ready For Surgery From Anesthesia Perspective.     .

## 2025-06-24 NOTE — ANESTHESIA POSTPROCEDURE EVALUATION
Anesthesia Post Evaluation    Patient: Yuriy Jerome    Procedure(s) Performed: Procedure(s) (LRB):  SLEEP ENDOSCOPY,DRUG-INDUCED (N/A)    Final Anesthesia Type: MAC      Patient location during evaluation: PACU  Patient participation: Yes- Able to Participate  Level of consciousness: awake and alert  Post-procedure vital signs: reviewed and stable  Pain management: adequate  Airway patency: patent    PONV status at discharge: No PONV  Anesthetic complications: no      Cardiovascular status: hemodynamically stable  Respiratory status: unassisted  Hydration status: euvolemic  Follow-up not needed.              Vitals Value Taken Time   /60 06/24/25 09:40   Temp 9 °C (48.2 °F) 06/24/25 08:30   Pulse 50 06/24/25 09:40   Resp 16 06/24/25 09:40   SpO2 93 % 06/24/25 09:40         Event Time   Out of Recovery 08:39:00         Pain/Marcela Score: Marcela Score: 10 (6/24/2025  9:40 AM)

## 2025-06-24 NOTE — TRANSFER OF CARE
Anesthesia Transfer of Care Note    Patient: Yuriy Jerome    Procedure(s) Performed: Procedure(s) (LRB):  SLEEP ENDOSCOPY,DRUG-INDUCED (N/A)    Patient location: PACU    Anesthesia Type: MAC    Transport from OR: Transported from OR on room air with adequate spontaneous ventilation    Post pain: adequate analgesia    Post assessment: no apparent anesthetic complications and tolerated procedure well    Post vital signs: stable    Level of consciousness: awake    Nausea/Vomiting: no nausea/vomiting    Complications: none    Transfer of care protocol was followed      Last vitals: Visit Vitals  BP (!) 143/82 (BP Location: Right arm, Patient Position: Lying)   Pulse (!) 54   Temp 36.3 °C (97.4 °F) (Temporal)   Resp 18   Ht 6' (1.829 m)   Wt 104.3 kg (230 lb)   SpO2 98%   BMI 31.19 kg/m²

## 2025-06-24 NOTE — OP NOTE
[facility] [place]  Surgery Department  Operative Note    SUMMARY     Date of Procedure: 6/24/2025     Procedure: Procedure(s) (LRB):  SLEEP ENDOSCOPY,DRUG-INDUCED (N/A)     Surgeons and Role:     * Patrick Lane MD - Primary    Assisting Surgeon: None    Pre-Operative Diagnosis: LACHO (obstructive sleep apnea) [G47.33]  Insomnia due to medical condition [G47.01]  Nasal polyps [J33.9]  Other polyp of sinus [J33.8]    Post-Operative Diagnosis: Post-Op Diagnosis Codes:     * LACHO (obstructive sleep apnea) [G47.33]     * Insomnia due to medical condition [G47.01]     * Nasal polyps [J33.9]     * Other polyp of sinus [J33.8]    Anesthesia: Monitor Anesthesia Care    INDICATIONS:  Yuriy Jerome is a 69 y.o. year old male with a history of obstructive sleep apnea and CPAP failure/intolerance.     Sleep Study:  Type: in-lab  Date: 3/17/2025  AHI: 96  RDI: 100  SANDY: 0  T90: 3.4%  O2 Americo: 87%  Comments:     PROCEDURE:  The procedure, risks, benefits, and alteratives were reviewed. Informed written consent was confirmed prior to surgery. Afrin was used to decongest the nose. 4% lidocaine jelly on 2 cottonoids were placed into the subjectively more open nasal passage; in this case the left side. The patient was brought to a dark OR room and positioned flat, supine on the stretching. Cardiopulmonary and Bispectral index monitoring were connected and confirmed to be functioning. The patients sleep study O2 Americo was 87%, 80% was an agreed on threshold for the use of supplemental O2. The patient was then sedated using dexmedetomidine which was titrated to a goal BIS of 60-65 or similar hypoxemia to previous sleep studies.     A flexible video laryngoscope was passed through the left nares to just below the level of the sphenoid rostrum before flexing downward. This allowed adequate viewing of the retropalatal airway. After this, the scope was advanced to the level of the oropharynx to view the retroglossal and  retroepiglottic airways respectively. Each airway site was observed for 4-5 breathing cycles. Other interventions such as jaw thrust, neck extension, and mouth closure were performed to observe their change on collapse patterns. After this, the laryngoscope was removed from the the nose and the procedure was deemed complete. The patient was given supplemental oxygen, awakened from anesthesia, and transported back to the PACU.     FINDINGS:    Subjective Assessment: Mouth agape, loud snoring, mouth breathing    Nasal Airway Comments: Minimal nasal obstruction, narrowed retropalatal space  Soft Palate Orientation: Oblique    VOTE (At rest)  BIS: 70-75  Velum:  2, AP closure  Oropharynx: 0,   Tongue:  2, base of tongue flaccidity  Epiglottis: 1, secondary to base of tongue    Scores are performed using the VOTE system which is defined as unobstructed (0), >75% obstruction (1) or total obstruction (2)    No severe desaturations below 80%    Recordings uploaded as Operative Report Addendums    Significant Surgical Tasks Conducted by the Assistant(s), if Applicable: None    Estimated Blood Loss (EBL): Minimal           Implants: * No implants in log *    Specimens:   Specimen (24h ago, onward)      None           * No specimens in log *           Condition: Good    Disposition: PACU - hemodynamically stable.    Attestation: Op Note Attestation: I performed the procedure.

## 2025-06-24 NOTE — PLAN OF CARE
Yuriy Jerome has met all discharge criteria from Phase II. Vital Signs are stable, ambulating  without difficulty. Discharge instructions given, patient verbalized understanding. Discharged from facility via wheelchair in stable condition.

## 2025-06-24 NOTE — BRIEF OP NOTE
[facility] - Surgery ([place])  Brief Operative Note    Surgery Date: 6/24/2025     Surgeons and Role:     * Patrick Lane MD - Primary    Assisting Surgeon: None    Pre-op Diagnosis:  LACHO (obstructive sleep apnea) [G47.33]  Insomnia due to medical condition [G47.01]  Nasal polyps [J33.9]  Other polyp of sinus [J33.8]    Post-op Diagnosis:  Post-Op Diagnosis Codes:     * LACHO (obstructive sleep apnea) [G47.33]     * Insomnia due to medical condition [G47.01]     * Nasal polyps [J33.9]     * Other polyp of sinus [J33.8]    Procedure(s) (LRB):  SLEEP ENDOSCOPY,DRUG-INDUCED (N/A)    Anesthesia: Monitor Anesthesia Care    Operative Findings: See full op note    Estimated Blood Loss: None         Specimens:   Specimen (24h ago, onward)      None            * No specimens in log *        Discharge Note    OUTCOME: Patient tolerated treatment/procedure well without complication and is now ready for discharge.    DISPOSITION: Home or Self Care    FINAL DIAGNOSIS:  LACHO    FOLLOWUP: In clinic    DISCHARGE INSTRUCTIONS:    Discharge Procedure Orders   No dressing needed     Activity as tolerated

## 2025-06-24 NOTE — DISCHARGE INSTRUCTIONS
Postoperative Instructions for DRUG-INDUCED SLEEP ENDOSCOPY    If you have any concerns following surgery that require immediate attention prior to postop clinic visit please call one of the following numbers:     FOR NON-URGENT ISSUES  For any postoperative issues please call [facility] Monday through Friday, 8am-5pm:  [phone]    If you are unable to reach anyone from the above listed number, please call the  at [facility] on [address]: [phone] (Ask to speak to the ENT resident or physician on call)    FOR EMERGENT ISSUES  For any emergent issues please go to the   [facility] (M-F 8a-3p) OR  [facility] OR   The nearest Emergency Department OR   Call 911    Follow-up Appointment  DATE: 6/27/25 @ 1:40  [facility], [address], [address]    Procedure Explanation:   Drug-induced sleep endoscopy is a diagnostic procedure to allow your surgeon to assess the characteristics of your collapse patterns while simulating natural sleep. This is done by inserting a camera through your nose. Any further sleep surgeries will be discussed with you at your follow-up visit.     Postoperative Diet  Resume your normal diet.     Postoperative Pain Control  Patients typically do not need pain control following the procedure. For any residual nasal discomfort please take over the counter tylenol as directed on the bottle.     Postoperative Nausea  Patients typically do not experience nausea following anesthesia; however, on occasion patient can experience nausea immediately following the procedure. You will be sent with a small amount of Zofran that you can take as needed.     Activity  Ok to resume normal activity after 24 hours. Please do not operate heavy machinery, drive, or make important decisions for 24 hours after anesthesia as the effects can sometimes take longer to wear off. You may return to work or school the day after the  procedure.

## 2025-06-27 ENCOUNTER — OFFICE VISIT (OUTPATIENT)
Dept: OTOLARYNGOLOGY | Facility: CLINIC | Age: 70
End: 2025-06-27
Payer: MEDICARE

## 2025-06-27 ENCOUNTER — TELEPHONE (OUTPATIENT)
Dept: OTOLARYNGOLOGY | Facility: CLINIC | Age: 70
End: 2025-06-27

## 2025-06-27 VITALS — SYSTOLIC BLOOD PRESSURE: 130 MMHG | DIASTOLIC BLOOD PRESSURE: 61 MMHG | HEART RATE: 75 BPM

## 2025-06-27 DIAGNOSIS — G47.33 OSA (OBSTRUCTIVE SLEEP APNEA): Primary | ICD-10-CM

## 2025-06-27 DIAGNOSIS — G47.01 INSOMNIA DUE TO MEDICAL CONDITION: ICD-10-CM

## 2025-06-27 NOTE — PROGRESS NOTES
Ear, Nose, & Throat  Otolaryngology - Head & Neck Surgery        Subjective:     Chief Complaint: LACHO    06/27/2025: Returns to discuss DISE    HPI:  Yuriy Jerome is a 69 y.o. male who was referred to me by Dr Lowry in consultation for LACHO.    Long-time history of LACHO with CPAP/BiPAP issues. Has recently transitioned to BiPAP. Last saw Alison Samano 2/2025. Issues initiating sleep.     Issues with urinary incontinence. Sees urology. Had a procedure scheduled with urology but had unstable angina in 2024 s/p PCI to LAD. ASA 81 and plavix.     Currently on BiPAP.     Sleep Study  Type: in-lab  Date: 3/17/2025  AHI: 96  RDI: 100  SANDY: 0  T90: 3.4%  O2 Americo: 87%  Comments:       BMI: 31.66  Neck Circumference: -    Trial of PAP therapy? Yes  Masks Tried: oronasal and nasal mask  Outcome: nonadherence, issues with mask leak, dry mouth, tight mask fit  Machine Returned? Yes  Other Therapies Trialed: none  ESS: 6  ARAVIND: 13 (2 2 2)   -    Smoker or former smoker? No  Do you drink more than 5 alcoholic beverages a week? Yes - wine  Do you take any medication that cause drowsiness? No  Do you take any medications specifically for sleep? No  Stimulants? sudafed    Nasal history: Chronic sinusitis, allergic rhinitis, nasal polyps  Nasal surgeries: Septoplasty, Balloon Sinuplasty  Current sinonasal meds: Flonase, sudafed prn  Last sinus infection:   NOSE score: 3 3 3 3 3    Prior airway surgeries? UPPP ([facility] 1980s)  Prior head/neck/chest surgeries? Thyroidectomy (PTC - [facility]) followed by [facility]  Previous radiation to head/neck/chest? none  Comorbid Conditions: CAD, HTN, T2DM  Existing implants: None  Anticoagulation: Plavix & Aspirin    Past Medical History  Active Ambulatory Problems     Diagnosis Date Noted    Obstructive sleep apnea 05/26/2016    Insomnia 08/24/2018    Hyperlipidemia 12/15/2021    Essential hypertension 12/15/2021    Benign prostatic hyperplasia 12/15/2021    Obesity (BMI 35.0-39.9 without comorbidity)  12/15/2021    Type 2 diabetes mellitus with other specified complication, without long-term current use of insulin     Subluxation of tarsal joint of left foot 05/19/2022    Morbid (severe) obesity due to excess calories 09/11/2023    Stented coronary artery 06/12/2024    Thyroid nodule 06/20/2024    Coronary atherosclerosis of native coronary artery 08/14/2024    Papillary thyroid carcinoma 12/13/2024    Postoperative hypothyroidism 12/13/2024     Resolved Ambulatory Problems     Diagnosis Date Noted    No Resolved Ambulatory Problems     Past Medical History:   Diagnosis Date    Allergy     Asthma 2000    Cataract 2018    Diabetes mellitus 2022    Hearing loss 2023    Hypertension 2014?    Migraine headache 1985?    Obesity 2014?    Osteoarthritis 1990?    Seasonal allergies 1981?    Sleep apnea 1990?    Staphylococcal infection        Past Surgical History  He has a past surgical history that includes Laminectomy (1989); Hernia repair (1963?); Knee surgery (Bilateral, 05/2011); Foot surgery (Left, 05/2017); Uvulopalatopharyngoplasty (2007); Foot surgery (Left, 05/19/2022); Biceps tendon repair (Left, 12/28/2021); Adenoidectomy (2001); Appendectomy (2005); Eye surgery (2017); Spine surgery (1991); Tonsillectomy (2001); Joint replacement (2009); Vasectomy (1985); Left heart catheterization (Left, 06/04/2024); Coronary angiography (N/A, 06/04/2024); ptca, single vessel (06/04/2024); percutaneous coronary intervention, artery (N/A, 06/04/2024); ivus, coronary (06/04/2024); Thyroidectomy (Bilateral, 11/15/2024); Sinus surgery (2012?); Cardiac surgery (6/4/24); Biceps tendon repair (Right, 2025); and sleep endoscopy, drug-induced (N/A, 6/24/2025).    Past Surgical History:   Procedure Laterality Date    ADENOIDECTOMY  2001    UPPP surgery    APPENDECTOMY  2005    BICEPS TENDON REPAIR Left 12/28/2021    BICEPS TENDON REPAIR Right 2025    CARDIAC SURGERY  6/4/24    Angiogram, balloon angioplasty, stents placed in LAD     CORONARY ANGIOGRAPHY N/A 06/04/2024    Procedure: ANGIOGRAM, CORONARY ARTERY;  Surgeon: Raji Noble MD;  Location: [facility] CATH LAB/EP;  Service: Cardiology;  Laterality: N/A;    EYE SURGERY  2017    Cararact surgery    FOOT SURGERY Left 05/2017    subtalar implant, 6 weeks later removed due to failure    FOOT SURGERY Left 05/19/2022    HERNIA REPAIR  1963?    DOUBLE age 8    IVUS, CORONARY  06/04/2024    Procedure: IVUS, Coronary;  Surgeon: Raji Noble MD;  Location: [facility] CATH LAB/EP;  Service: Cardiology;;    JOINT REPLACEMENT  2009    Bilateral knee replacement    KNEE SURGERY Bilateral 05/2011    TKR, had ligament replacement in right , meniscus repair both knees    LAMINECTOMY  1989    spinal    LEFT HEART CATHETERIZATION Left 06/04/2024    Procedure: Left heart cath;  Surgeon: Raji Noble MD;  Location: [facility] CATH LAB/EP;  Service: Cardiology;  Laterality: Left;    PERCUTANEOUS CORONARY INTERVENTION, ARTERY N/A 06/04/2024    Procedure: Percutaneous coronary intervention;  Surgeon: Raji Noble MD;  Location: [facility] CATH LAB/EP;  Service: Cardiology;  Laterality: N/A;    PTCA, SINGLE VESSEL  06/04/2024    Procedure: PTCA, Single Vessel;  Surgeon: Raji Noble MD;  Location: [facility] CATH LAB/EP;  Service: Cardiology;;    SINUS SURGERY  2012?    Balloon sinuplasty    SLEEP ENDOSCOPY, DRUG-INDUCED N/A 6/24/2025    Procedure: SLEEP ENDOSCOPY,DRUG-INDUCED;  Surgeon: Patrick Lane MD;  Location: [facility];  Service: ENT;  Laterality: N/A;    SPINE SURGERY  1991    Triple laminectomy    THYROIDECTOMY Bilateral 11/15/2024    Procedure: THYROIDECTOMY, TOTAL;  Surgeon: Vlad Avelar MD;  Location: [address];  Service: ENT;  Laterality: Bilateral;  Nerve monitoring booked at 7am on 11/15 (CL 10/21).    TONSILLECTOMY  2001    UPPP surgery    UVULOPALATOPHARYNGOPLASTY  2007    VASECTOMY  1985        Family History  His family history includes Arthritis in his mother; Asthma in his  father; Cancer in his maternal grandfather and maternal grandmother; Drug abuse in his brother, brother, brother, and brother; Heart disease in his father; Heart failure in his brother and father; Migraines in his mother; Osteoarthritis in his mother; Stroke in his mother.    Social History  He reports that he has never smoked. He has never used smokeless tobacco. He reports current alcohol use of about 11.0 standard drinks of alcohol per week. He reports that he does not use drugs.    Allergies  He is allergic to hay fever and allergy relief.    Medications  He has a current medication list which includes the following prescription(s): alpha lipoic acid, amlodipine, aspirin, clopidogrel, coq10 (ubiquinol), creatine (bulk), finasteride, fluticasone propionate, levothyroxine, losartan-hydrochlorothiazide 100-12.5 mg, metformin, mirabegron, multivit with minerals/lutein, nitroglycerin, fish oil-omega-3 fatty acids, ondansetron, and rosuvastatin, and the following Facility-Administered Medications: sodium chloride 0.9%.    ROS:  Pertinent positive and negative review of systems as noted in HPI.     Objective:     /61 (BP Location: Right arm, Patient Position: Sitting)   Pulse 75    Constitutional: Well appearing, communicating. No acute distress  Voice: Euphonic  Head/Face:   House Brackmann I Bilaterally  No masses on palption  Nose:    Septum Midline    mild edematous turbinate hypertrophy   no rhinorrhea present   External Valve Collapse: none   Modified Daisy: DNT   Sinus pressure: not present  Oral Cavity   Dentition: good    Occlusion: class 2   ALEX: >40 mm, no trismus   Hard Palate: WNL   No tongue atrophy, symmetric protrusion  Oropharynx:   S/p UPPP, c/f decreased AP diameter of retropalatal space   Chand Tongue Position: 3 (partial obstruction soft palate)   No pharyngeal erythema   Symmetric Palate elevation  Neck   Hyomental Distance: 4 fingerbreadth  Submandibular glands not enlarged, symmetric,  and ptotic   Cervical lymph nodes not palpable   Thyroid gland not enlarged and symmetric   Trachea midline   Laryngeal landmarks palpable   ROM: intact  Chest   No scars, no other implants   Soft tissue bulk: Moderate  Neuro/Psychiatric:     Affect: Appropriate  Respiratory:   No increased WOB  No stridor       Data Review:   MEDICAL RECORDS    I have reviewed the following medical records relevant to the care of this patient from unique sources outside of my institution or departmental specialty:  Cardiology and Sleep Medicine    LABS  INR (no units)   Date Value   10/10/2005 0.9     aPTT (sec)   Date Value   10/10/2005 30.9     Platelet Count (K/uL)   Date Value   04/17/2025 228     Platelets (K/uL)   Date Value   11/16/2024 236     ,   WBC (K/uL)   Date Value   04/17/2025 4.96     Eos # (K/uL)   Date Value   04/17/2025 0.19   11/16/2024 0.0     Eos % (%)   Date Value   04/17/2025 3.8     Platelet Count (K/uL)   Date Value   04/17/2025 228     Platelets (K/uL)   Date Value   11/16/2024 236     Hemoglobin A1C (%)   Date Value   11/05/2024 6.0 (H)     Hemoglobin A1c (%)   Date Value   04/17/2025 5.8 (H)        I have independently reviewed the lab results shown above. Findings significant for the conditions being treated include: A1c <8, no marked eosinophilia    IMAGING    No pertinent imaging available    SLEEP STUDY    Sleep Study  See above    Procedures:       Assessment:     No diagnosis found.    Plan:     I had a long discussion with the patient regarding his condition and the further workup and management options. Patient appears to have a very narrow retropalatal space secondary to contraction of UPPP, which could be complicating his PAP therapy.     I had a long discussion with this patient regarding her condition and management strategies.  Patient has a history of untreated LACHO with CPAP nonadherence or failure.  I believe he is a great candidate for surgical treatment of sleep apnea and specifically  hypoglossal nerve stimulator implant (Inspire).     We discussed the risks of the procedure which include but are not limited to bleeding, infection, scarring, swelling, extrusion, lead wire tethering, temporary or permanent weakness to the nerves controlling the lower lip or tongue, pneumothorax, inadequate response, stimulation tenderness, treatment emergent sleep apnea, hardware malfunction, stimulation related arousals, need for future procedure, the need for device maintenance.  We discussed the current battery life on the system is estimated to be around 11 years.  This would require a future smaller surgical procedure to exchange the battery.  We discussed that I do not believe any of these would be as effective or offer greater morbidity.     Voice recognition software was used in the creation of this note/communication and any sound-alike errors which may have occurred from its use should be taken in context when interpreting. If such errors prevent a clear understanding of the note/communication, please contact the office for clarification.     Problem List Items Addressed This Visit    None

## 2025-06-28 ENCOUNTER — PATIENT MESSAGE (OUTPATIENT)
Dept: OTOLARYNGOLOGY | Facility: CLINIC | Age: 70
End: 2025-06-28
Payer: MEDICARE

## 2025-07-08 ENCOUNTER — LAB VISIT (OUTPATIENT)
Dept: LAB | Facility: HOSPITAL | Age: 70
End: 2025-07-08
Attending: STUDENT IN AN ORGANIZED HEALTH CARE EDUCATION/TRAINING PROGRAM
Payer: MEDICARE

## 2025-07-08 DIAGNOSIS — C73 PAPILLARY THYROID CARCINOMA: ICD-10-CM

## 2025-07-08 LAB — TSH SERPL-ACNC: 0.7 UIU/ML (ref 0.4–4)

## 2025-07-08 PROCEDURE — 84443 ASSAY THYROID STIM HORMONE: CPT

## 2025-07-08 PROCEDURE — 86800 THYROGLOBULIN ANTIBODY: CPT

## 2025-07-08 PROCEDURE — 36415 COLL VENOUS BLD VENIPUNCTURE: CPT

## 2025-07-09 LAB
ENDOCRINOLOGIST REVIEW: NORMAL
THYROGLOB AB SERPL IA-ACNC: <1.8 IU/ML
THYROGLOB SERPL-MCNC: 3 NG/ML

## 2025-07-10 ENCOUNTER — PATIENT MESSAGE (OUTPATIENT)
Dept: ENDOCRINOLOGY | Facility: CLINIC | Age: 70
End: 2025-07-10
Payer: MEDICARE

## 2025-07-10 ENCOUNTER — PATIENT MESSAGE (OUTPATIENT)
Dept: OTOLARYNGOLOGY | Facility: CLINIC | Age: 70
End: 2025-07-10
Payer: MEDICARE

## 2025-07-10 ENCOUNTER — TELEPHONE (OUTPATIENT)
Dept: ENDOCRINOLOGY | Facility: CLINIC | Age: 70
End: 2025-07-10
Payer: MEDICARE

## 2025-07-10 DIAGNOSIS — C73 PAPILLARY THYROID CARCINOMA: Primary | ICD-10-CM

## 2025-07-10 NOTE — TELEPHONE ENCOUNTER
----- Message from Suraj Bridges MD sent at 7/10/2025  8:56 AM CDT -----  Non fasting labs in 3 months    Also needs f/u, not urgent, can be earliest available next year, can be virtual if he prefers

## 2025-07-18 ENCOUNTER — ANESTHESIA EVENT (OUTPATIENT)
Dept: SURGERY | Facility: OTHER | Age: 70
End: 2025-07-18
Payer: MEDICARE

## 2025-07-21 ENCOUNTER — PATIENT MESSAGE (OUTPATIENT)
Dept: PREADMISSION TESTING | Facility: OTHER | Age: 70
End: 2025-07-21
Payer: MEDICARE

## 2025-07-21 ENCOUNTER — TELEPHONE (OUTPATIENT)
Dept: OTOLARYNGOLOGY | Facility: CLINIC | Age: 70
End: 2025-07-21
Payer: MEDICARE

## 2025-07-21 NOTE — PRE ADMISSION SCREENING
Notified Dr Noble's that pt has upcoming procedure .  [place] Noble [place] limited access to epic.   He stated :[place] and have limited access to Epic. He can hold Plavix for 5 days before the surgery and restart post. Continue aspirin if ok with surgery team.   Dr Lane would like him to continue ASA  Pt notified

## 2025-07-23 NOTE — ANESTHESIA PREPROCEDURE EVALUATION
07/23/2025  Yuriy Jerome is a 69 y.o., male.      Pre-op Assessment    I have reviewed the Patient Summary Reports.     I have reviewed the Nursing Notes. I have reviewed the NPO Status.   I have reviewed the Medications.     Review of Systems  Anesthesia Hx:             Denies Family Hx of Anesthesia complications.    Denies Personal Hx of Anesthesia complications.                    Social:  Non-Smoker       Hematology/Oncology:  Hematology Normal                       --  Cancer in past history (Papillary thyroid carcinoma):              surgery   Oncology Comments: Radioactive iodine (6 months ago)     EENT/Dental:  EENT/Dental Normal           Cardiovascular:     Hypertension, well controlled   CAD   CABG/stent (PTCA and stent to prox/mid LAD with PRAVEENA in 2024)    Denies Angina.         06/2024: Stress test abnormal for ischemia prompted left heart catheterization 99% stenosis in the prox/mid LAD segment required 2 PRAVEENA     In cardiac rehab x3 week (weights and cardio)                           Pulmonary:    Asthma (rarely uses inhaler) mild  Denies Shortness of breath.  Sleep Apnea                Renal/:    BPH BPH (reports planned for prostate surgery soon)             Hepatic/GI:  Hepatic/GI Normal                    Musculoskeletal:  Arthritis   H/o osteomyelitis/ Staphylococcal infection around 2017             Neurological:    Denies CVA.   Headaches Denies Seizures.                                Endocrine:  Diabetes, well controlled, type 2 Hypothyroidism        Obesity / BMI > 30  Dermatological:  Skin Normal    Psych:  Psychiatric Normal                  Physical Exam  General: Well nourished, Cooperative, Alert and Oriented    Airway:  Mallampati: II   Mouth Opening: Normal  TM Distance: Normal  Tongue: Normal  Neck ROM: Normal ROM    Dental:  Caps / Implants      Anesthesia Plan  Type of  Anesthesia, risks & benefits discussed:    Anesthesia Type: Gen ETT  Intra-op Monitoring Plan: Standard ASA Monitors  Post Op Pain Control Plan: multimodal analgesia  Induction:  IV  Airway Plan: Video, Post-Induction  ASA Score: 3  Day of Surgery Review of History & Physical: H&P Update referred to the surgeon/provider.  Anesthesia Plan Notes: CBC, LOLY   Had sleep endoscopy 06/2025 at [facility]  Routine cardiology note 04/2025 reviewed  On plavix, Per Dr Noble: He can hold Plavix for 5 days before the surgery and restart post    Ready For Surgery From Anesthesia Perspective.     .

## 2025-07-24 ENCOUNTER — HOSPITAL ENCOUNTER (OUTPATIENT)
Dept: PREADMISSION TESTING | Facility: OTHER | Age: 70
Discharge: HOME | End: 2025-07-24
Attending: STUDENT IN AN ORGANIZED HEALTH CARE EDUCATION/TRAINING PROGRAM
Payer: MEDICARE

## 2025-07-24 DIAGNOSIS — Z01.818 PREOP TESTING: Primary | ICD-10-CM

## 2025-07-24 LAB
ABSOLUTE EOSINOPHIL (OHS): 0.18 K/UL
ABSOLUTE MONOCYTE (OHS): 0.77 K/UL (ref 0.3–1)
ABSOLUTE NEUTROPHIL COUNT (OHS): 4.98 K/UL (ref 1.8–7.7)
ANION GAP (OHS): 8 MMOL/L (ref 8–16)
BASOPHILS # BLD AUTO: 0.03 K/UL
BASOPHILS NFR BLD AUTO: 0.4 %
BUN SERPL-MCNC: 15 MG/DL (ref 8–23)
CALCIUM SERPL-MCNC: 9.2 MG/DL (ref 8.7–10.5)
CHLORIDE SERPL-SCNC: 105 MMOL/L (ref 95–110)
CO2 SERPL-SCNC: 25 MMOL/L (ref 23–29)
CREAT SERPL-MCNC: 1.2 MG/DL (ref 0.5–1.4)
ERYTHROCYTE [DISTWIDTH] IN BLOOD BY AUTOMATED COUNT: 12.5 % (ref 11.5–14.5)
GFR SERPLBLD CREATININE-BSD FMLA CKD-EPI: >60 ML/MIN/1.73/M2
GLUCOSE SERPL-MCNC: 106 MG/DL (ref 70–110)
HCT VFR BLD AUTO: 44 % (ref 40–54)
HGB BLD-MCNC: 14.7 GM/DL (ref 14–18)
IMM GRANULOCYTES # BLD AUTO: 0.03 K/UL (ref 0–0.04)
IMM GRANULOCYTES NFR BLD AUTO: 0.4 % (ref 0–0.5)
LYMPHOCYTES # BLD AUTO: 1.07 K/UL (ref 1–4.8)
MCH RBC QN AUTO: 31.2 PG (ref 27–31)
MCHC RBC AUTO-ENTMCNC: 33.4 G/DL (ref 32–36)
MCV RBC AUTO: 93 FL (ref 82–98)
NUCLEATED RBC (/100WBC) (OHS): 0 /100 WBC
PLATELET # BLD AUTO: 244 K/UL (ref 150–450)
PMV BLD AUTO: 10.2 FL (ref 9.2–12.9)
POTASSIUM SERPL-SCNC: 4.2 MMOL/L (ref 3.5–5.1)
RBC # BLD AUTO: 4.71 M/UL (ref 4.6–6.2)
RELATIVE EOSINOPHIL (OHS): 2.5 %
RELATIVE LYMPHOCYTE (OHS): 15.2 % (ref 18–48)
RELATIVE MONOCYTE (OHS): 10.9 % (ref 4–15)
RELATIVE NEUTROPHIL (OHS): 70.6 % (ref 38–73)
SODIUM SERPL-SCNC: 138 MMOL/L (ref 136–145)
WBC # BLD AUTO: 7.06 K/UL (ref 3.9–12.7)

## 2025-07-24 PROCEDURE — 82374 ASSAY BLOOD CARBON DIOXIDE: CPT

## 2025-07-24 PROCEDURE — 85025 COMPLETE CBC W/AUTO DIFF WBC: CPT

## 2025-07-24 RX ORDER — LIDOCAINE HYDROCHLORIDE 10 MG/ML
0.5 INJECTION, SOLUTION EPIDURAL; INFILTRATION; INTRACAUDAL; PERINEURAL ONCE
OUTPATIENT
Start: 2025-07-24 | End: 2025-07-24

## 2025-07-24 RX ORDER — SODIUM CHLORIDE, SODIUM LACTATE, POTASSIUM CHLORIDE, CALCIUM CHLORIDE 600; 310; 30; 20 MG/100ML; MG/100ML; MG/100ML; MG/100ML
INJECTION, SOLUTION INTRAVENOUS CONTINUOUS
OUTPATIENT
Start: 2025-07-24

## 2025-07-24 RX ORDER — ACETAMINOPHEN 500 MG
1000 TABLET ORAL
OUTPATIENT
Start: 2025-07-24 | End: 2025-07-24

## 2025-07-24 NOTE — DISCHARGE INSTRUCTIONS
Postoperative Instructions for HYPOGLOSSAL NERVE STIMULATOR IMPLANT (INSPIRE)    If you have any concerns following surgery that require immediate attention prior to postop clinic visit please call one of the following numbers:     FOR NON-URGENT ISSUES  Any non-pressing post-surgical issues will be discussed at your scheduled postoperative visit. If you are unsure when you postop visit is scheduled, please reach out to the clinic.     For any more pressing postoperative issues please call [facility] Monday through Friday, 8am-5pm:  [phone]    If you are unable to reach anyone from the above listed number, please call the  at [facility] on [address]: [phone] (Ask to speak to the ENT resident or physician on call)    FOR EMERGENT ISSUES  For any emergent issues please go to the   [facility] (M-F 8a-3p)   OR  [facility]   OR   The nearest Emergency Department   OR   Call 911    Follow-up Appointment  DATE: 8/4/2025 @ 2:00 pm  [facility], [address], [address]    Procedure Explanation:   A hypoglossal nerve stimulator (INSPIRE) is inserted through 2 incisions in your neck and chest. These are connected with a wire passed deeply underneath the skin to connect the components. Once the device is activated in clinic, you will be able to turn on and use the device when you go to sleep. The device works by sensing when you try to breathe at night and sending a signal to move the tongue forward, opening up the airway. This process repeats thousands of times throughout the night to ensure that you are getting better breaths resulting in decreased episodes of obstructive sleep apnea.     Postop Instructions:  1. Remove dressings over neck and chest incisions after 24 hours unless otherwise directed. There will be a layer of skin glue over the surface of the wounds protect the dissolvable sutures while healing.   2. Some patient's  "experience tethering of the wire between their neck and chest in the immediate weeks following surgery with head extension. This will resolve in time with decreased swelling. It is important however to start postop day 1 with with full range of motion neck rolls and soft massages of any section of the wire that can be felt underneath the skin.  3. Swelling at the incision site (particularly the neck) is expected after surgery. This can give the appearance of a "double chin". This will improve over 3-5 weeks following surgery.   4. Avoid submerging in water for 2 weeks after surgery (e.g. pool, bath, boating)  5. A sling may be applied to the right arm after surgery. This is to limit movement of the right arm following surgery. You may remove the sling after 24 hours;   however, if you find it more comfortable, it is ok to wear longer. Limit any lifting of the right arm over the shoulder for 1 month.   6. Avoid any strenuous activity for 2 weeks  7. Avoid any heavy lifting over 30 lbs for 3 weeks  8. Avoid any contact to the chest (e.g. hunting rifle, contact sports, etc.) for 6 weeks after surgery    Postoperative Diet  Resume your normal diet.     Postop Hygiene  1. Ok to shower 24 hours after surgery once the dressings are removed. Let warm, soapy water run over the incision. Avoid excessive scrubbing over the incisions. Pat to dry.   2. Avoid taking any baths where the incision is submerged under water  3. Ok to resume normal oral hygiene (toothbrush, mouthwash, etc.)  4. Avoid shaving of the incision lines for 2 weeks after surgery until skin glue is no longer present.  5. At roughly 2 weeks you should notice some of the skin glue flaking off. This is normal.  At this point it is ok to gently pick some of the skin glue off while in a hot shower.    Postop Medications     Antibiotics  You will be given a dose of oral antibiotics after surgery. Please take them as directed until completed. It is imperative to " preventing infection of the implant following surgery.      Pain Control  You will most likely be given tylenol and ibuprofen following surgery unless contraindicated. ALTERNATE taking every 3 hours while awake for pain for the first 2 weeks. After 2 weeks, you can continue this regimen as needed.     You will also be given a prescription narcotic. Take this as needed during the first two weeks for pain NOT relived by tylenol and ibuprofen alone. Most patients do not require any narcotics more than 2 weeks after surgery.    AVOID operating heavy machinery, driving, or making important decisions while using narcotic pain medications.     Nausea Control  You will be given Zofran for nausea. Take as needed for postoperative nausea and vomiting. This is most common in the immediate days following surgery but can also be associated with narcotic pain medication.         It is best to start slow eating in the 24 hours after surgery. Avoid spicy or fatty foods that made be harder to digest following anesthesia.     Bowel Regimen  Both general anesthesia, prolonged fasting, and narcotic pain medications can change you bowel habits after surgery. It is best to eat a higher fiber diet to avoid constipation in the days following surgery.     For prolonged constipation (> 3 days), take an OTC stool softener such as Colace while taking narcotic medication to prevent constipation. If no bowel movement in 48 hours take miralax 1 pack twice a day until bowel movement occurs.       Return Precautions  Please reach out to our office (or seek emergency care if needed) for any of the following issues. For concerns about surgical site infections or wound breakdown, pictures uploaded through the [facility] patient portal can be very helpful in triaging your problem.      1. Any sustained fever greater than 101 F for over 6 hours   2. Signs of infections such worsening erythema or pus at the surgical site   3. Any separation or breakdown  of the sutured wound   4. Signs of bleeding such as persistent oozing from the surgical site or excessive swelling/bruising underneath the skin   5. Any changes interfering with your ability to breathe or eat should be evaluated in the emergency room   6. Any neurologic changes such as new unexplained numbness or muscle weakness should be evaluated in the emergency room

## 2025-07-24 NOTE — DISCHARGE INSTRUCTIONS
Information to Prepare you for your Surgery    PRE-ADMIT TESTING   [address]  [place] [address]  [address]     Your surgery has been scheduled at [facility]. We are pleased to have the opportunity to serve you. For Further Information please call [phone].    On the day of surgery please report to Registration on the 1st floor of the [facility].    CONTACT YOUR PHYSICIAN'S OFFICE THE DAY PRIOR TO YOUR SURGERY TO OBTAIN YOUR ARRIVAL TIME.     The evening before surgery do not eat anything after 9 p.m. ( this includes hard candy, chewing gum and mints).  You may only have GATORADE, POWERADE AND WATER  from 9 p.m. until you leave your home.   DO NOT DRINK ANY LIQUIDS ON THE WAY TO THE HOSPITAL.      Why does your anesthesiologist allow you to drink Gatorade/Powerade before surgery?  Gatorade/Powerade helps to increase your comfort before surgery and to decrease your nausea after surgery.   The carbohydrates in Gatorade/Powerade help reduce your body's stress response to surgery.  If you are a diabetic-drink only water prior to surgery.    Outpatient Surgery- May allow 2 adults (18 and older)/ Support Persons (1 being the designated ) for all surgical/procedural patients. A breastfeeding mother will be allowed her infant and 2 adult Support Persons. No one under the age of 18 will be allowed in the building.    MEDICATION INSTRUCTIONS: TAKE medications checked off by the Anesthesiologist on your Medication List.    Surgery Patients:  If you take ASPIRIN - Your PHYSICIAN/SURGEON will need to inform you IF/OR when you need to stop taking aspirin prior to your surgery.     Starting the week prior to surgery, do not take any medications containing IBUPROFEN or NSAIDS (Advil, Aleve, BC, Celebrex, Goody's, Ketorolac, Meloxicam, Mobic, Motrin, Naproxen, Toradol, etc).  If you are not sure if you should take a medicine please call your surgeon's office.  You may take Tylenol.    Do  Not Wear any make-up (especially eye make-up) to surgery. Please remove any false eyelashes or eyelash extensions. If you arrive the day of surgery with makeup/eyelashes on you will be required to remove prior to surgery. (There is a risk of corneal abrasions if eye makeup/eyelash extensions are not removed)    Leave all valuables at home.   Do Not wear any jewelry or watches, including any metal in body piercings. Jewelry must be removed prior to coming to the hospital.  There is a possibility that rings that are unable to be removed may be cut off if they are on the surgical extremity.    Please remove all hair extensions, wigs, clips and any other metal accessories/ ornaments from your hair.  These items may pose a flammable/fire risk in Surgery and must be removed.    Do not shave your surgical area at least 5 days prior to your surgery. The surgical prep will be performed at the hospital according to Infection Control regulations.    Contact Lens must be removed before surgery. Either do not wear the contact lens or bring a case and solution for storage.  Please bring a container for eyeglasses or dentures as required.  Bring any paperwork your physician has provided, such as consent forms,  history and physicals, doctor's orders, etc.   Bring comfortable clothes that are loose fitting to wear upon discharge. Take into consideration the type of surgery being performed.  Maintain your diet as advised per your physician the day prior to surgery.    Adequate rest the night before surgery is advised.   Park in the Parking lot behind the hospital or in the [place] [facility] across the street from the parking lot. Parking is complimentary.  If you will be discharged the same day as your procedure, please arrange for a responsible adult to drive you home or to accompany you if traveling by taxi.   YOU WILL NOT BE PERMITTED TO DRIVE OR TO LEAVE THE HOSPITAL ALONE AFTER SURGERY.   If you are being discharged the  same day, it is strongly recommended that you arrange for someone to remain with you for the first 24 hrs following your surgery.    The Surgeon will speak to your family/visitor after your surgery regarding the outcome of your surgery and post op care.  The Surgeon may speak to you after your surgery, but there is a possibility you may not remember the details.  Please check with your family members regarding the conversation with the Surgeon.    We strongly recommend whoever is bringing you home be present for discharge instructions.  This will ensure a thorough understanding for your post op home care.              Bathing Instructions with Hibiclens  Shower the evening before and morning of your procedure with Chlorhexidine (Hibiclens)    Do not use Chlorhexidine on your face or genitals. Do not get in your eyes.  Wash your face with water and your regular face wash/soap  Use your regular shampoo  Apply Chlorhexidine (Hibiclens) directly on your skin or on a wet washcloth and wash gently. When showering: Move away from the shower stream when applying Chlorhexidine (Hibiclens) to avoid rinsing off too soon.  Rinse thoroughly with warm water  Do not dilute Chlorhexidine (Hibiclens)   Dry off as usual, do not use any deodorant, powder, body lotions, perfume, after shave or cologne.     If the patient has fever, cough, or signs/symptoms of Flu or Covid please do not come in for your surgery.   Contact your surgeon and your primary care physician for further instructions.   Please also call [facility] [phone]. The unit opens at 5 AM.    If applicable, please bring your blood pressure & diabetes medications the day of surgery.

## 2025-07-28 ENCOUNTER — TELEPHONE (OUTPATIENT)
Dept: OTOLARYNGOLOGY | Facility: CLINIC | Age: 70
End: 2025-07-28

## 2025-07-29 ENCOUNTER — HOSPITAL ENCOUNTER (OUTPATIENT)
Facility: OTHER | Age: 70
Discharge: HOME | End: 2025-07-29
Attending: STUDENT IN AN ORGANIZED HEALTH CARE EDUCATION/TRAINING PROGRAM | Admitting: STUDENT IN AN ORGANIZED HEALTH CARE EDUCATION/TRAINING PROGRAM
Payer: MEDICARE

## 2025-07-29 ENCOUNTER — ANESTHESIA (OUTPATIENT)
Dept: SURGERY | Facility: OTHER | Age: 70
End: 2025-07-29
Payer: MEDICARE

## 2025-07-29 DIAGNOSIS — G47.33 OSA (OBSTRUCTIVE SLEEP APNEA): Primary | ICD-10-CM

## 2025-07-29 LAB
POCT GLUCOSE: 123 MG/DL (ref 70–110)
POCT GLUCOSE: 138 MG/DL (ref 70–110)

## 2025-07-29 PROCEDURE — 63600175 PHARM REV CODE 636 W HCPCS: Performed by: NURSE ANESTHETIST, CERTIFIED REGISTERED

## 2025-07-29 PROCEDURE — 37000008 HC ANESTHESIA 1ST 15 MINUTES: Performed by: STUDENT IN AN ORGANIZED HEALTH CARE EDUCATION/TRAINING PROGRAM

## 2025-07-29 PROCEDURE — C1778 LEAD, NEUROSTIMULATOR: HCPCS | Performed by: STUDENT IN AN ORGANIZED HEALTH CARE EDUCATION/TRAINING PROGRAM

## 2025-07-29 PROCEDURE — 25000003 PHARM REV CODE 250: Performed by: NURSE ANESTHETIST, CERTIFIED REGISTERED

## 2025-07-29 PROCEDURE — 71000033 HC RECOVERY, INTIAL HOUR: Performed by: STUDENT IN AN ORGANIZED HEALTH CARE EDUCATION/TRAINING PROGRAM

## 2025-07-29 PROCEDURE — C1767 GENERATOR, NEURO NON-RECHARG: HCPCS | Performed by: STUDENT IN AN ORGANIZED HEALTH CARE EDUCATION/TRAINING PROGRAM

## 2025-07-29 PROCEDURE — 71000016 HC POSTOP RECOV ADDL HR: Performed by: STUDENT IN AN ORGANIZED HEALTH CARE EDUCATION/TRAINING PROGRAM

## 2025-07-29 PROCEDURE — 64582 OPN MPLTJ HPGLSL NSTM ARY PG: CPT | Mod: RT,,, | Performed by: STUDENT IN AN ORGANIZED HEALTH CARE EDUCATION/TRAINING PROGRAM

## 2025-07-29 PROCEDURE — 36000706: Performed by: STUDENT IN AN ORGANIZED HEALTH CARE EDUCATION/TRAINING PROGRAM

## 2025-07-29 PROCEDURE — 25000003 PHARM REV CODE 250

## 2025-07-29 PROCEDURE — 36000707: Performed by: STUDENT IN AN ORGANIZED HEALTH CARE EDUCATION/TRAINING PROGRAM

## 2025-07-29 PROCEDURE — 71000015 HC POSTOP RECOV 1ST HR: Performed by: STUDENT IN AN ORGANIZED HEALTH CARE EDUCATION/TRAINING PROGRAM

## 2025-07-29 PROCEDURE — 63600175 PHARM REV CODE 636 W HCPCS: Performed by: STUDENT IN AN ORGANIZED HEALTH CARE EDUCATION/TRAINING PROGRAM

## 2025-07-29 PROCEDURE — 63600175 PHARM REV CODE 636 W HCPCS

## 2025-07-29 PROCEDURE — 37000009 HC ANESTHESIA EA ADD 15 MINS: Performed by: STUDENT IN AN ORGANIZED HEALTH CARE EDUCATION/TRAINING PROGRAM

## 2025-07-29 PROCEDURE — 82962 GLUCOSE BLOOD TEST: CPT | Performed by: STUDENT IN AN ORGANIZED HEALTH CARE EDUCATION/TRAINING PROGRAM

## 2025-07-29 PROCEDURE — 25000003 PHARM REV CODE 250: Performed by: ANESTHESIOLOGY

## 2025-07-29 PROCEDURE — C1787 PATIENT PROGR, NEUROSTIM: HCPCS | Performed by: STUDENT IN AN ORGANIZED HEALTH CARE EDUCATION/TRAINING PROGRAM

## 2025-07-29 PROCEDURE — 71000039 HC RECOVERY, EACH ADD'L HOUR: Performed by: STUDENT IN AN ORGANIZED HEALTH CARE EDUCATION/TRAINING PROGRAM

## 2025-07-29 PROCEDURE — 27201423 OPTIME MED/SURG SUP & DEVICES STERILE SUPPLY: Performed by: STUDENT IN AN ORGANIZED HEALTH CARE EDUCATION/TRAINING PROGRAM

## 2025-07-29 DEVICE — LEAD STIMULATION IMPLANTABLE: Type: IMPLANTABLE DEVICE | Site: NECK | Status: FUNCTIONAL

## 2025-07-29 DEVICE — GENERATOR PULSE IMPLANTABLE: Type: IMPLANTABLE DEVICE | Site: CHEST | Status: FUNCTIONAL

## 2025-07-29 DEVICE — IMPLANTABLE DEVICE: Type: IMPLANTABLE DEVICE | Site: CHEST | Status: FUNCTIONAL

## 2025-07-29 RX ORDER — FENTANYL CITRATE 50 UG/ML
INJECTION, SOLUTION INTRAMUSCULAR; INTRAVENOUS
Status: DISCONTINUED | OUTPATIENT
Start: 2025-07-29 | End: 2025-07-29

## 2025-07-29 RX ORDER — PROCHLORPERAZINE EDISYLATE 5 MG/ML
5 INJECTION INTRAMUSCULAR; INTRAVENOUS EVERY 30 MIN PRN
Status: DISCONTINUED | OUTPATIENT
Start: 2025-07-29 | End: 2025-07-29 | Stop reason: HOSPADM

## 2025-07-29 RX ORDER — DEXAMETHASONE SODIUM PHOSPHATE 4 MG/ML
INJECTION, SOLUTION INTRA-ARTICULAR; INTRALESIONAL; INTRAMUSCULAR; INTRAVENOUS; SOFT TISSUE
Status: DISCONTINUED | OUTPATIENT
Start: 2025-07-29 | End: 2025-07-29

## 2025-07-29 RX ORDER — LIDOCAINE HYDROCHLORIDE 10 MG/ML
1 INJECTION, SOLUTION EPIDURAL; INFILTRATION; INTRACAUDAL; PERINEURAL ONCE
Status: DISCONTINUED | OUTPATIENT
Start: 2025-07-29 | End: 2025-07-29 | Stop reason: HOSPADM

## 2025-07-29 RX ORDER — EPHEDRINE SULFATE 50 MG/ML
INJECTION, SOLUTION INTRAVENOUS
Status: DISCONTINUED | OUTPATIENT
Start: 2025-07-29 | End: 2025-07-29

## 2025-07-29 RX ORDER — DIPHENHYDRAMINE HYDROCHLORIDE 50 MG/ML
12.5 INJECTION, SOLUTION INTRAMUSCULAR; INTRAVENOUS EVERY 30 MIN PRN
Status: DISCONTINUED | OUTPATIENT
Start: 2025-07-29 | End: 2025-07-29 | Stop reason: HOSPADM

## 2025-07-29 RX ORDER — ACETAMINOPHEN 500 MG
1000 TABLET ORAL
Status: COMPLETED | OUTPATIENT
Start: 2025-07-29 | End: 2025-07-29

## 2025-07-29 RX ORDER — ONDANSETRON 4 MG/1
4 TABLET, ORALLY DISINTEGRATING ORAL EVERY 6 HOURS PRN
Qty: 30 TABLET | Refills: 0 | Status: SHIPPED | OUTPATIENT
Start: 2025-07-29

## 2025-07-29 RX ORDER — HYDROMORPHONE HYDROCHLORIDE 2 MG/ML
0.4 INJECTION, SOLUTION INTRAMUSCULAR; INTRAVENOUS; SUBCUTANEOUS EVERY 5 MIN PRN
Status: DISCONTINUED | OUTPATIENT
Start: 2025-07-29 | End: 2025-07-29 | Stop reason: HOSPADM

## 2025-07-29 RX ORDER — MIDAZOLAM HYDROCHLORIDE 1 MG/ML
INJECTION INTRAMUSCULAR; INTRAVENOUS
Status: DISCONTINUED | OUTPATIENT
Start: 2025-07-29 | End: 2025-07-29

## 2025-07-29 RX ORDER — SODIUM CHLORIDE, SODIUM LACTATE, POTASSIUM CHLORIDE, CALCIUM CHLORIDE 600; 310; 30; 20 MG/100ML; MG/100ML; MG/100ML; MG/100ML
INJECTION, SOLUTION INTRAVENOUS CONTINUOUS
Status: DISCONTINUED | OUTPATIENT
Start: 2025-07-29 | End: 2025-07-29 | Stop reason: HOSPADM

## 2025-07-29 RX ORDER — OXYCODONE HYDROCHLORIDE 5 MG/1
5 TABLET ORAL EVERY 4 HOURS PRN
Status: DISCONTINUED | OUTPATIENT
Start: 2025-07-29 | End: 2025-07-29 | Stop reason: HOSPADM

## 2025-07-29 RX ORDER — SODIUM CHLORIDE 0.9 % (FLUSH) 0.9 %
3 SYRINGE (ML) INJECTION
Status: DISCONTINUED | OUTPATIENT
Start: 2025-07-29 | End: 2025-07-29 | Stop reason: HOSPADM

## 2025-07-29 RX ORDER — SUCCINYLCHOLINE CHLORIDE 20 MG/ML
INJECTION INTRAMUSCULAR; INTRAVENOUS
Status: DISCONTINUED | OUTPATIENT
Start: 2025-07-29 | End: 2025-07-29

## 2025-07-29 RX ORDER — LIDOCAINE HYDROCHLORIDE AND EPINEPHRINE 10; 10 UG/ML; MG/ML
INJECTION, SOLUTION INFILTRATION; PERINEURAL
Status: DISCONTINUED | OUTPATIENT
Start: 2025-07-29 | End: 2025-07-29 | Stop reason: HOSPADM

## 2025-07-29 RX ORDER — ONDANSETRON HYDROCHLORIDE 2 MG/ML
INJECTION, SOLUTION INTRAVENOUS
Status: DISCONTINUED | OUTPATIENT
Start: 2025-07-29 | End: 2025-07-29

## 2025-07-29 RX ORDER — LIDOCAINE HYDROCHLORIDE 10 MG/ML
0.5 INJECTION, SOLUTION EPIDURAL; INFILTRATION; INTRACAUDAL; PERINEURAL ONCE
Status: DISCONTINUED | OUTPATIENT
Start: 2025-07-29 | End: 2025-07-29 | Stop reason: HOSPADM

## 2025-07-29 RX ORDER — SODIUM CHLORIDE 0.9 % (FLUSH) 0.9 %
2 SYRINGE (ML) INJECTION
Status: DISCONTINUED | OUTPATIENT
Start: 2025-07-29 | End: 2025-07-29 | Stop reason: HOSPADM

## 2025-07-29 RX ORDER — CEPHALEXIN 500 MG/1
500 CAPSULE ORAL EVERY 8 HOURS
Qty: 15 CAPSULE | Refills: 0 | Status: SHIPPED | OUTPATIENT
Start: 2025-07-29 | End: 2025-08-03

## 2025-07-29 RX ORDER — PROPOFOL 10 MG/ML
VIAL (ML) INTRAVENOUS
Status: DISCONTINUED | OUTPATIENT
Start: 2025-07-29 | End: 2025-07-29

## 2025-07-29 RX ORDER — PROPOFOL 10 MG/ML
VIAL (ML) INTRAVENOUS CONTINUOUS PRN
Status: DISCONTINUED | OUTPATIENT
Start: 2025-07-29 | End: 2025-07-29

## 2025-07-29 RX ORDER — GLUCAGON 1 MG
1 KIT INJECTION
Status: DISCONTINUED | OUTPATIENT
Start: 2025-07-29 | End: 2025-07-29 | Stop reason: HOSPADM

## 2025-07-29 RX ORDER — CEFAZOLIN 2 G/1
2 INJECTION, POWDER, FOR SOLUTION INTRAMUSCULAR; INTRAVENOUS
Status: COMPLETED | OUTPATIENT
Start: 2025-07-29 | End: 2025-07-29

## 2025-07-29 RX ORDER — LIDOCAINE HYDROCHLORIDE 20 MG/ML
INJECTION INTRAVENOUS
Status: DISCONTINUED | OUTPATIENT
Start: 2025-07-29 | End: 2025-07-29

## 2025-07-29 RX ORDER — HYDROCODONE BITARTRATE AND ACETAMINOPHEN 5; 325 MG/1; MG/1
1 TABLET ORAL EVERY 6 HOURS PRN
Qty: 30 TABLET | Refills: 0 | Status: SHIPPED | OUTPATIENT
Start: 2025-07-29

## 2025-07-29 RX ADMIN — SUCCINYLCHOLINE CHLORIDE 170 MG: 20 INJECTION, SOLUTION INTRAMUSCULAR; INTRAVENOUS at 10:07

## 2025-07-29 RX ADMIN — FENTANYL CITRATE 100 MCG: 50 INJECTION, SOLUTION INTRAMUSCULAR; INTRAVENOUS at 10:07

## 2025-07-29 RX ADMIN — PHENYLEPHRINE HYDROCHLORIDE 0.3 MCG/KG/MIN: 10 INJECTION INTRAVENOUS at 10:07

## 2025-07-29 RX ADMIN — SODIUM CHLORIDE, SODIUM LACTATE, POTASSIUM CHLORIDE, AND CALCIUM CHLORIDE: 600; 310; 30; 20 INJECTION, SOLUTION INTRAVENOUS at 12:07

## 2025-07-29 RX ADMIN — DEXAMETHASONE SODIUM PHOSPHATE 8 MG: 4 INJECTION, SOLUTION INTRAMUSCULAR; INTRAVENOUS at 10:07

## 2025-07-29 RX ADMIN — OXYCODONE HYDROCHLORIDE 5 MG: 5 TABLET ORAL at 01:07

## 2025-07-29 RX ADMIN — SODIUM CHLORIDE, SODIUM LACTATE, POTASSIUM CHLORIDE, AND CALCIUM CHLORIDE: 600; 310; 30; 20 INJECTION, SOLUTION INTRAVENOUS at 09:07

## 2025-07-29 RX ADMIN — LIDOCAINE HYDROCHLORIDE 100 MG: 20 INJECTION, SOLUTION INTRAVENOUS at 10:07

## 2025-07-29 RX ADMIN — EPHEDRINE SULFATE 10 MG: 50 INJECTION INTRAVENOUS at 10:07

## 2025-07-29 RX ADMIN — PROPOFOL 50 MCG/KG/MIN: 10 INJECTION, EMULSION INTRAVENOUS at 10:07

## 2025-07-29 RX ADMIN — ACETAMINOPHEN 1000 MG: 500 TABLET ORAL at 09:07

## 2025-07-29 RX ADMIN — MIDAZOLAM HYDROCHLORIDE 1 MG: 1 INJECTION, SOLUTION INTRAMUSCULAR; INTRAVENOUS at 09:07

## 2025-07-29 RX ADMIN — PROPOFOL 180 MG: 10 INJECTION, EMULSION INTRAVENOUS at 10:07

## 2025-07-29 RX ADMIN — CEFAZOLIN 2 G: 2 INJECTION, POWDER, FOR SOLUTION INTRAMUSCULAR; INTRAVENOUS at 10:07

## 2025-07-29 RX ADMIN — EPHEDRINE SULFATE 10 MG: 50 INJECTION INTRAVENOUS at 11:07

## 2025-07-29 RX ADMIN — ONDANSETRON HYDROCHLORIDE 4 MG: 2 INJECTION INTRAMUSCULAR; INTRAVENOUS at 12:07

## 2025-07-29 NOTE — ANESTHESIA PROCEDURE NOTES
Intubation    Date/Time: 7/29/2025 10:04 AM    Performed by: Georgina Sterling  Authorized by: Wolfgang Gayle MD    Intubation:     Induction:  Intravenous    Intubated:  Postinduction    Mask Ventilation:  Not attempted    Attempts:  1    Attempted By:  CRNA    Method of Intubation:  Video laryngoscopy    Blade:  Curiel 3    Laryngeal View Grade: Grade I - full view of cords      Difficult Airway Encountered?: No      Complications:  None    Airway Device:  Oral endotracheal tube    Airway Device Size:  7.5    Style/Cuff Inflation:  Cuffed (inflated to minimal occlusive pressure)    Tube secured:  22    Secured at:  The lips    Placement Verified By:  Capnometry    Complicating Factors:  None    Findings Post-Intubation:  BS equal bilateral and atraumatic/condition of teeth unchanged

## 2025-07-29 NOTE — ANESTHESIA POSTPROCEDURE EVALUATION
Anesthesia Post Evaluation    Patient: Yuriy Jerome    Procedure(s) Performed: Procedure(s) (LRB):  INSERTION, HYPOGLOSSAL NEUROSTIMULATOR (INSPIRE IV) (N/A)    Final Anesthesia Type: general      Patient location during evaluation: PACU  Patient participation: Yes- Able to Participate  Level of consciousness: awake and alert  Post-procedure vital signs: reviewed and stable  Pain management: adequate  Airway patency: patent    PONV status at discharge: No PONV  Anesthetic complications: no      Cardiovascular status: blood pressure returned to baseline  Respiratory status: spontaneous ventilation  Hydration status: euvolemic  Follow-up not needed.          Vitals Value Taken Time   /64 07/29/25 14:00   Temp 36.8 °C (98.3 °F) 07/29/25 14:00   Pulse 77 07/29/25 14:00   Resp 18 07/29/25 14:00   SpO2 95 % 07/29/25 14:00         Event Time   Out of Recovery 13:56:38         Pain/Marcela Score: Pain Rating Prior to Med Admin: 4 (7/29/2025  1:38 PM)  Marcela Score: 10 (7/29/2025  2:00 PM)

## 2025-07-29 NOTE — OP NOTE
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 male with a history of moderate to severe obstructive sleep apnea intolerant to PAP therapy.     Operative Findings (including complications, if any):     Normal Hypoglossal Nerve Anatomy, 0 late hypoglossal branch points  Nonobstructive and Retracted Ranine Vein  Normal Chest Anatomy, sense lead placed between ribs 2 and 3    Description of Technical Procedures:     The patient was identified in the preoperative area. The procedure was reviewed, history updated, and consented verified. All questions were answered to the best of my ability. The patient was brought to the operating room and placed under general anesthesia without the use of paralytics. The NIMS monitor was connected to leads in the hypoglossus, genioglossus, and grounded into the deltoid. The electrode were checked to be in working condition. A shoulder roll was placed and the patient was prepped and draped in usual sterile fashion with the head turned to the left. A 5 cm modified submandibular incision was made in the right upper neck approximately 2 cm below the mandible. Dissection was carried down through the subcutaneous tissue and platysma. The anterior/inferior border of the  submandibular gland was identified as well as the digastric tendon. The submandibular gland and the overlying fascia with the marginal mandibular nerve were retracted posteriorly. The digastric tendon was identified and retracted inferiorly with vessel loops. Dissection continued down into the digastric triangle until the posterior border of the mylohyoid muscle was identified. This was retracted anteriorly to identify the fascia overlying the hypoglossal nerve and ranine vein. The overlying fascial was carefully dissected off the surface of the nerve to make its branch points more readily identifiable. The ranine vein was noted to be caudal to the nerve and did not obstruct access. The breakpoint distinguishing nerve fibers innervating the protracting muscles of the tongue were meticulous identified using anatomic clues and confirmed with intraoperative neuro stimulation vis the NIMS. A vessel loop was placed around the proposed inclusive branches. The nerve bundle was then systematically tested from caudal to cranial. The nerve was rolled over to check for late hyoglossus branches. In total, 0 late hyoglossus branches were identified after the initial breakpoint dissection. C1 was able to be included in the cuff due to its anatomic variance. The cuff electrode for the hypoglossal nerve stimulator was carefully placed around the isolated protractor nerve bundle. The cuff was flushed clear of any bloody debris with sterile saline. The stimulation lead was anchored to the digastric tendon using two 3.0 silk sutures and lead body slack between the cuff and the anchor gently tucked deep to the submandibular gland.     A second 5 cm incision was made in the right upper chest over the second intercostal space, approximately 3cm lateral to the sternal margin. Dissection was carried down through the skin and subcutaneous tissue to the fascia of the pectoralis muscle. An inferior pocket for the generator was created deep to  the subcutaneous layer and superficial to the fascia of the pectoralis muscle. The pectoralis major fascia was dissected directly over the second intercostal space with subsequent blunt dissection through the muscle. The pectoralis major/minor was then retracted to expose the fatty layer just superficial to the external intercostals. The fatty layer was carefully swept away to expose the external intercostal muscles. A throw-down base knot was placed to the fascia of the external intercostals just lateral to the anterior external membrane using 3.0 silk suture. A fasciotomy through the external intercostals was performed approximately 5 mm lateral to the suture knot and the respiratory sense lead was advanced with the sensor facing the pleura into the interfascial plane between the external and internal intercostals. The primary anchor was sutured into place with 3.0 silk on the external intercostals. The secondary anchor was sutured with 3.0 silk to the pectoralis major allowing adequate slack between the anchors.    The stimulation lead was then tunneled in a subplatysmal plane with blunt dissection under direct visualization and brought out into the sub-clavicular pocket where both the stimulation lead and the respiratory sensing lead were connected to the implantable pulse generator, using the two person, three-handed approach. The implantable pulse generator was placed in the subclavicular pocket ensuring lead body was deep to the generator and secured with use of air knots to the pectoralis fascia using 2.0 silk sutures.     Diagnostic evaluation confirmed good placement of the stimulation cuff as demonstrated by activation of the genioglossus and transverse and vertical muscles, resulting in unhindered, stiffened tongue protrusion, confirmed visually. Diagnostic evaluation also confirmed good respiratory sensor placement as demonstrated by a sensing waveform with good rise and fall associated with patient  respirations.    All the wounds were thoroughly irrigated and closed in three layers with vicryl and Monocryl sutures on the skin. A pressure dressing was placed over both wounds consisting of telfa and tegaderms. The patient's right arm was placed in a temporary sling. The patient was then awakened, extubated, and transferred to PACU in stable condition.      Significant Surgical Tasks Conducted by the Assistant(s), if Applicable:     Estimated Blood Loss (EBL): 20 cc           Implants:   Implant Name Type Inv. Item Serial No.  Lot No. LRB No. Used Action   LEAD NS RESPIRATORY - [id number]  LEAD NS RESPIRATORY [id number] [employer]  Right 1 Implanted   LEAD STIMULATION IMPLANTABLE - [id number] Lead LEAD STIMULATION IMPLANTABLE [id number] [employer]  Right 1 Implanted   GENERATOR PULSE IMPLANTABLE - [id number]  GENERATOR PULSE IMPLANTABLE [id number] [employer]  Right 1 Implanted       Specimens:   Specimen (24h ago, onward)      None           * No specimens in log *           Condition: Good    Disposition: PACU - hemodynamically stable.    Attestation: Op Note Attestation: I was physically present and scrubbed for the entire procedure.

## 2025-07-29 NOTE — H&P
I have seen and examined the patient in the pre-op area. There have been no significant interval changes to the history or physical examination as noted below. Plan is to proceed to the OR for Procedure(s):  INSERTION, HYPOGLOSSAL NEUROSTIMULATOR (INSPIRE IV).    Rakan Gutierrez MD  [facility] Otolaryngology, PGY4  07/29/2025 9:00 AM      Ear, Nose, & Throat  Otolaryngology - Head & Neck Surgery           Subjective:      Chief Complaint: LACHO     06/27/2025: Returns to discuss DISE     HPI:  Yuriy Jerome is a 69 y.o. male who was referred to me by Dr Lowry in consultation for LACHO.     Long-time history of LACHO with CPAP/BiPAP issues. Has recently transitioned to BiPAP. Last saw Alison Selwyn 2/2025. Issues initiating sleep.      Issues with urinary incontinence. Sees urology. Had a procedure scheduled with urology but had unstable angina in 2024 s/p PCI to LAD. ASA 81 and plavix.      Currently on BiPAP.      Sleep Study  Type: in-lab  Date: 3/17/2025  AHI: 96  RDI: 100  SANDY: 0  T90: 3.4%  O2 Americo: 87%  Comments:         BMI: 31.66  Neck Circumference: -     Trial of PAP therapy? Yes  Masks Tried: oronasal and nasal mask  Outcome: nonadherence, issues with mask leak, dry mouth, tight mask fit  Machine Returned? Yes  Other Therapies Trialed: none  ESS: 6  ARAVIND: 13 (2 2 2)              -     Smoker or former smoker? No  Do you drink more than 5 alcoholic beverages a week? Yes - wine  Do you take any medication that cause drowsiness? No  Do you take any medications specifically for sleep? No  Stimulants? sudafed     Nasal history: Chronic sinusitis, allergic rhinitis, nasal polyps  Nasal surgeries: Septoplasty, Balloon Sinuplasty  Current sinonasal meds: Flonase, sudafed prn  Last sinus infection:   NOSE score: 3 3 3 3 3     Prior airway surgeries? UPPP ([facility] 1980s)  Prior head/neck/chest surgeries? Thyroidectomy (PTC - [facility]) followed by [facility]  Previous radiation to head/neck/chest? none  Comorbid Conditions: CAD,  HTN, T2DM  Existing implants: None  Anticoagulation: Plavix & Aspirin     Past Medical History       Active Ambulatory Problems     Diagnosis Date Noted    Obstructive sleep apnea 05/26/2016    Insomnia 08/24/2018    Hyperlipidemia 12/15/2021    Essential hypertension 12/15/2021    Benign prostatic hyperplasia 12/15/2021    Obesity (BMI 35.0-39.9 without comorbidity) 12/15/2021    Type 2 diabetes mellitus with other specified complication, without long-term current use of insulin      Subluxation of tarsal joint of left foot 05/19/2022    Morbid (severe) obesity due to excess calories 09/11/2023    Stented coronary artery 06/12/2024    Thyroid nodule 06/20/2024    Coronary atherosclerosis of native coronary artery 08/14/2024    Papillary thyroid carcinoma 12/13/2024    Postoperative hypothyroidism 12/13/2024           Resolved Ambulatory Problems     Diagnosis Date Noted    No Resolved Ambulatory Problems           Past Medical History:   Diagnosis Date    Allergy      Asthma 2000    Cataract 2018    Diabetes mellitus 2022    Hearing loss 2023    Hypertension 2014?    Migraine headache 1985?    Obesity 2014?    Osteoarthritis 1990?    Seasonal allergies 1981?    Sleep apnea 1990?    Staphylococcal infection           Past Surgical History  He has a past surgical history that includes Laminectomy (1989); Hernia repair (1963?); Knee surgery (Bilateral, 05/2011); Foot surgery (Left, 05/2017); Uvulopalatopharyngoplasty (2007); Foot surgery (Left, 05/19/2022); Biceps tendon repair (Left, 12/28/2021); Adenoidectomy (2001); Appendectomy (2005); Eye surgery (2017); Spine surgery (1991); Tonsillectomy (2001); Joint replacement (2009); Vasectomy (1985); Left heart catheterization (Left, 06/04/2024); Coronary angiography (N/A, 06/04/2024); ptca, single vessel (06/04/2024); percutaneous coronary intervention, artery (N/A, 06/04/2024); ivus, coronary (06/04/2024); Thyroidectomy (Bilateral, 11/15/2024); Sinus surgery (2012?);  Cardiac surgery (6/4/24); Biceps tendon repair (Right, 2025); and sleep endoscopy, drug-induced (N/A, 6/24/2025).           Past Surgical History:   Procedure Laterality Date    ADENOIDECTOMY   2001     UPPP surgery    APPENDECTOMY   2005    BICEPS TENDON REPAIR Left 12/28/2021    BICEPS TENDON REPAIR Right 2025    CARDIAC SURGERY   6/4/24     Angiogram, balloon angioplasty, stents placed in LAD    CORONARY ANGIOGRAPHY N/A 06/04/2024     Procedure: ANGIOGRAM, CORONARY ARTERY;  Surgeon: Raji Noble MD;  Location: [facility] CATH LAB/EP;  Service: Cardiology;  Laterality: N/A;    EYE SURGERY   2017     Cararact surgery    FOOT SURGERY Left 05/2017     subtalar implant, 6 weeks later removed due to failure    FOOT SURGERY Left 05/19/2022    HERNIA REPAIR   1963?     DOUBLE age 8    IVUS, CORONARY   06/04/2024     Procedure: IVUS, Coronary;  Surgeon: Raji Noble MD;  Location: [facility] CATH LAB/EP;  Service: Cardiology;;    JOINT REPLACEMENT   2009     Bilateral knee replacement    KNEE SURGERY Bilateral 05/2011     TKR, had ligament replacement in right , meniscus repair both knees    LAMINECTOMY   1989     spinal    LEFT HEART CATHETERIZATION Left 06/04/2024     Procedure: Left heart cath;  Surgeon: Raji Noble MD;  Location: [facility] CATH LAB/EP;  Service: Cardiology;  Laterality: Left;    PERCUTANEOUS CORONARY INTERVENTION, ARTERY N/A 06/04/2024     Procedure: Percutaneous coronary intervention;  Surgeon: Raji Noble MD;  Location: [facility] CATH LAB/EP;  Service: Cardiology;  Laterality: N/A;    PTCA, SINGLE VESSEL   06/04/2024     Procedure: PTCA, Single Vessel;  Surgeon: Raji Noble MD;  Location: [facility] CATH LAB/EP;  Service: Cardiology;;    SINUS SURGERY   2012?     Balloon sinuplasty    SLEEP ENDOSCOPY, DRUG-INDUCED N/A 6/24/2025     Procedure: SLEEP ENDOSCOPY,DRUG-INDUCED;  Surgeon: Patrick Lane MD;  Location: [facility] OR;  Service: ENT;  Laterality: N/A;    SPINE SURGERY   1991     Triple  laminectomy    THYROIDECTOMY Bilateral 11/15/2024     Procedure: THYROIDECTOMY, TOTAL;  Surgeon: Vlad Avelar MD;  Location: [facility] OR [address] [place] [address];  Service: ENT;  Laterality: Bilateral;  Nerve monitoring booked at 7am on 11/15 (CL 10/21).    TONSILLECTOMY   2001     UP surgery    UVULOPALATOPHARYNGOPLASTY   2007    VASECTOMY   1985         Family History  His family history includes Arthritis in his mother; Asthma in his father; Cancer in his maternal grandfather and maternal grandmother; Drug abuse in his brother, brother, brother, and brother; Heart disease in his father; Heart failure in his brother and father; Migraines in his mother; Osteoarthritis in his mother; Stroke in his mother.     Social History  He reports that he has never smoked. He has never used smokeless tobacco. He reports current alcohol use of about 11.0 standard drinks of alcohol per week. He reports that he does not use drugs.     Allergies  He is allergic to hay fever and allergy relief.     Medications  He has a current medication list which includes the following prescription(s): alpha lipoic acid, amlodipine, aspirin, clopidogrel, coq10 (ubiquinol), creatine (bulk), finasteride, fluticasone propionate, levothyroxine, losartan-hydrochlorothiazide 100-12.5 mg, metformin, mirabegron, multivit with minerals/lutein, nitroglycerin, fish oil-omega-3 fatty acids, ondansetron, and rosuvastatin, and the following Facility-Administered Medications: sodium chloride 0.9%.     ROS:  Pertinent positive and negative review of systems as noted in HPI.      Objective:      /61 (BP Location: Right arm, Patient Position: Sitting)   Pulse 75    Constitutional: Well appearing, communicating. No acute distress  Voice: Euphonic  Head/Face:   House Brackmann I Bilaterally  No masses on palption  Nose:               Septum Midline               mild edematous turbinate hypertrophy              no rhinorrhea present              External Valve  Collapse: none              Modified Morton: DNT              Sinus pressure: not present  Oral Cavity              Dentition: good               Occlusion: class 2              ALEX: >40 mm, no trismus              Hard Palate: WNL              No tongue atrophy, symmetric protrusion  Oropharynx:              S/p UPPP, c/f decreased AP diameter of retropalatal space              Chand Tongue Position: 3 (partial obstruction soft palate)              No pharyngeal erythema              Symmetric Palate elevation  Neck              Hyomental Distance: 4 fingerbreadth  Submandibular glands not enlarged, symmetric, and ptotic              Cervical lymph nodes not palpable              Thyroid gland not enlarged and symmetric              Trachea midline              Laryngeal landmarks palpable              ROM: intact  Chest              No scars, no other implants              Soft tissue bulk: Moderate  Neuro/Psychiatric:                Affect: Appropriate  Respiratory:   No increased WOB  No stridor         Data Review:   MEDICAL RECORDS     I have reviewed the following medical records relevant to the care of this patient from unique sources outside of my institution or departmental specialty:  Cardiology and Sleep Medicine     LABS      INR (no units)   Date Value   10/10/2005 0.9          aPTT (sec)   Date Value   10/10/2005 30.9          Platelet Count (K/uL)   Date Value   04/17/2025 228          Platelets (K/uL)   Date Value   11/16/2024 236      ,       WBC (K/uL)   Date Value   04/17/2025 4.96          Eos # (K/uL)   Date Value   04/17/2025 0.19   11/16/2024 0.0          Eos % (%)   Date Value   04/17/2025 3.8          Platelet Count (K/uL)   Date Value   04/17/2025 228          Platelets (K/uL)   Date Value   11/16/2024 236          Hemoglobin A1C (%)   Date Value   11/05/2024 6.0 (H)          Hemoglobin A1c (%)   Date Value   04/17/2025 5.8 (H)         I have independently reviewed the lab results shown  above. Findings significant for the conditions being treated include: A1c <8, no marked eosinophilia     IMAGING     No pertinent imaging available     SLEEP STUDY     Sleep Study  See above     Procedures:         Assessment:      No diagnosis found.     Plan:      I had a long discussion with the patient regarding his condition and the further workup and management options. Patient appears to have a very narrow retropalatal space secondary to contraction of UPPP, which could be complicating his PAP therapy.      I had a long discussion with this patient regarding her condition and management strategies.  Patient has a history of untreated LACHO with CPAP nonadherence or failure.  I believe he is a great candidate for surgical treatment of sleep apnea and specifically hypoglossal nerve stimulator implant (Inspire).      We discussed the risks of the procedure which include but are not limited to bleeding, infection, scarring, swelling, extrusion, lead wire tethering, temporary or permanent weakness to the nerves controlling the lower lip or tongue, pneumothorax, inadequate response, stimulation tenderness, treatment emergent sleep apnea, hardware malfunction, stimulation related arousals, need for future procedure, the need for device maintenance.  We discussed the current battery life on the system is estimated to be around 11 years.  This would require a future smaller surgical procedure to exchange the battery.  We discussed that I do not believe any of these would be as effective or offer greater morbidity.

## 2025-07-29 NOTE — BRIEF OP NOTE
[facility] - Surgery ([place])  Brief Operative Note    Surgery Date: 7/29/2025     Surgeons and Role:     * Patrick Lane MD - Primary    Assisting Surgeon: None    Pre-op Diagnosis:  LACHO (obstructive sleep apnea) [G47.33]  Insomnia due to medical condition [G47.01]  Body mass index (BMI) 31.0-31.9, adult [Z68.31]    Post-op Diagnosis:  Post-Op Diagnosis Codes:     * LACHO (obstructive sleep apnea) [G47.33]     * Insomnia due to medical condition [G47.01]     * Body mass index (BMI) 31.0-31.9, adult [Z68.31]    Procedure(s) (LRB):  INSERTION, HYPOGLOSSAL NEUROSTIMULATOR (INSPIRE IV) (N/A)    Anesthesia: General    Operative Findings: See full op note    Estimated Blood Loss: 20 cc         Specimens:   Specimen (24h ago, onward)      None            * No specimens in log *        Discharge Note    OUTCOME: Patient tolerated treatment/procedure well without complication and is now ready for discharge.    DISPOSITION: Home or Self Care    FINAL DIAGNOSIS:  LACHO    FOLLOWUP: In clinic    DISCHARGE INSTRUCTIONS:  See separate patient instructions

## 2025-07-29 NOTE — OR NURSING
Marcela score of 10, patient is alert, reports tolerable 2/10 pain, denies nausea, maintaining SPO2 on room air. Patient has met criteria and is ready for transfer.

## 2025-07-29 NOTE — TRANSFER OF CARE
Anesthesia Transfer of Care Note    Patient: Yuriy Jerome    Procedure(s) Performed: Procedure(s) (LRB):  INSERTION, HYPOGLOSSAL NEUROSTIMULATOR (INSPIRE IV) (N/A)    Patient location: PACU    Anesthesia Type: general    Transport from OR: Transported from OR on 2-3 L/min O2 by NC with adequate spontaneous ventilation    Post pain: adequate analgesia    Post assessment: no apparent anesthetic complications    Post vital signs: stable    Level of consciousness: awake    Nausea/Vomiting: no nausea/vomiting    Complications: none    Transfer of care protocol was followed      Last vitals: Visit Vitals  BP (!) 151/63 (BP Location: Right arm, Patient Position: Lying)   Pulse 80   Temp 36.2 °C (97.2 °F) (Skin)   Resp 14   Ht 6' (1.829 m)   Wt 106.6 kg (235 lb)   SpO2 96%   BMI 31.87 kg/m²

## 2025-07-30 VITALS
WEIGHT: 235 LBS | HEART RATE: 78 BPM | BODY MASS INDEX: 31.83 KG/M2 | TEMPERATURE: 98 F | OXYGEN SATURATION: 96 % | RESPIRATION RATE: 18 BRPM | SYSTOLIC BLOOD PRESSURE: 134 MMHG | HEIGHT: 72 IN | DIASTOLIC BLOOD PRESSURE: 78 MMHG

## 2025-08-04 ENCOUNTER — OFFICE VISIT (OUTPATIENT)
Dept: OTOLARYNGOLOGY | Facility: CLINIC | Age: 70
End: 2025-08-04
Payer: MEDICARE

## 2025-08-04 VITALS
DIASTOLIC BLOOD PRESSURE: 76 MMHG | BODY MASS INDEX: 33.26 KG/M2 | WEIGHT: 245.19 LBS | SYSTOLIC BLOOD PRESSURE: 139 MMHG | HEART RATE: 80 BPM

## 2025-08-04 DIAGNOSIS — Z96.82 S/P INSERTION OF HYPOGLOSSAL NERVE STIMULATOR: Primary | ICD-10-CM

## 2025-08-04 DIAGNOSIS — G47.33 OSA (OBSTRUCTIVE SLEEP APNEA): ICD-10-CM

## 2025-08-04 PROCEDURE — 1101F PT FALLS ASSESS-DOCD LE1/YR: CPT | Mod: CPTII,S$GLB,,

## 2025-08-04 PROCEDURE — 1125F AMNT PAIN NOTED PAIN PRSNT: CPT | Mod: CPTII,S$GLB,,

## 2025-08-04 PROCEDURE — 3044F HG A1C LEVEL LT 7.0%: CPT | Mod: CPTII,S$GLB,,

## 2025-08-04 PROCEDURE — 3288F FALL RISK ASSESSMENT DOCD: CPT | Mod: CPTII,S$GLB,,

## 2025-08-04 PROCEDURE — 3075F SYST BP GE 130 - 139MM HG: CPT | Mod: CPTII,S$GLB,,

## 2025-08-04 PROCEDURE — 3078F DIAST BP <80 MM HG: CPT | Mod: CPTII,S$GLB,,

## 2025-08-04 PROCEDURE — 3061F NEG MICROALBUMINURIA REV: CPT | Mod: CPTII,S$GLB,,

## 2025-08-04 PROCEDURE — 99024 POSTOP FOLLOW-UP VISIT: CPT | Mod: S$GLB,,,

## 2025-08-04 PROCEDURE — 3066F NEPHROPATHY DOC TX: CPT | Mod: CPTII,S$GLB,,

## 2025-08-04 PROCEDURE — 1159F MED LIST DOCD IN RCRD: CPT | Mod: CPTII,S$GLB,,

## 2025-08-04 NOTE — PROGRESS NOTES
Ear, Nose, & Throat  Otolaryngology - Head & Neck Surgery    Subjective:     Chief Complaint: Post op visit     Procedure: Hypoglossal Nerve Stimulator Implant   Date: 07/29/2025 8/4/25: Presenting today for post op visit 1. Overall he is doing well. His only complaint is a bitter/metallic taste in his mouth and halitosis. Some tethering with extension of the neck. He has been doing ROM exercises. No change in talking, chewing, or movement of the tongue. No dysphagia or odynophagia. No nasal drainage or PND.       Past Medical History  Active Ambulatory Problems     Diagnosis Date Noted    Obstructive sleep apnea 05/26/2016    Insomnia 08/24/2018    Hyperlipidemia 12/15/2021    Essential hypertension 12/15/2021    Benign prostatic hyperplasia 12/15/2021    Obesity (BMI 35.0-39.9 without comorbidity) 12/15/2021    Type 2 diabetes mellitus with other specified complication, without long-term current use of insulin     Subluxation of tarsal joint of left foot 05/19/2022    Morbid (severe) obesity due to excess calories 09/11/2023    Stented coronary artery 06/12/2024    Thyroid nodule 06/20/2024    Coronary atherosclerosis of native coronary artery 08/14/2024    Papillary thyroid carcinoma 12/13/2024    Postoperative hypothyroidism 12/13/2024     Resolved Ambulatory Problems     Diagnosis Date Noted    No Resolved Ambulatory Problems     Past Medical History:   Diagnosis Date    Allergy     Asthma 2000    Cataract 2018    Diabetes mellitus 2022    Hearing loss 2023    Hypertension 2014?    Migraine headache 1985?    Obesity 2014?    Osteoarthritis 1990?    Seasonal allergies 1981?    Sleep apnea 1990?    Staphylococcal infection        Past Surgical History  He has a past surgical history that includes Laminectomy (1989); Hernia repair (1963?); Knee surgery (Bilateral, 05/2011); Foot surgery (Left, 05/2017); Uvulopalatopharyngoplasty (2007); Foot surgery (Left, 05/19/2022); Biceps tendon repair (Left, 12/28/2021);  Adenoidectomy (2001); Appendectomy (2005); Eye surgery (2017); Spine surgery (1991); Tonsillectomy (2001); Joint replacement (2009); Vasectomy (1985); Left heart catheterization (Left, 06/04/2024); Coronary angiography (N/A, 06/04/2024); ptca, single vessel (06/04/2024); percutaneous coronary intervention, artery (N/A, 06/04/2024); ivus, coronary (06/04/2024); Thyroidectomy (Bilateral, 11/15/2024); Sinus surgery (2012?); Cardiac surgery (6/4/24); sleep endoscopy, drug-induced (N/A, 06/24/2025); and insertion, neurostimulator, hypoglossal (N/A, 7/29/2025).    Past Surgical History:   Procedure Laterality Date    ADENOIDECTOMY  2001    UPPP surgery    APPENDECTOMY  2005    BICEPS TENDON REPAIR Left 12/28/2021    CARDIAC SURGERY  6/4/24    Angiogram, balloon angioplasty, stents placed in LAD    CORONARY ANGIOGRAPHY N/A 06/04/2024    Procedure: ANGIOGRAM, CORONARY ARTERY;  Surgeon: Raji Noble MD;  Location: [facility] CATH LAB/EP;  Service: Cardiology;  Laterality: N/A;    EYE SURGERY  2017    Cararact surgery    FOOT SURGERY Left 05/2017    subtalar implant, 6 weeks later removed due to failure    FOOT SURGERY Left 05/19/2022    HERNIA REPAIR  1963?    DOUBLE age 8    INSERTION, NEUROSTIMULATOR, HYPOGLOSSAL N/A 7/29/2025    Procedure: INSERTION, HYPOGLOSSAL NEUROSTIMULATOR (INSPIRE IV);  Surgeon: Patrick Lane MD;  Location: [facility] OR;  Service: ENT;  Laterality: N/A;  NIMS Monitor    IVUS, CORONARY  06/04/2024    Procedure: IVUS, Coronary;  Surgeon: Raji Noble MD;  Location: [facility] CATH LAB/EP;  Service: Cardiology;;    JOINT REPLACEMENT  2009    Bilateral knee replacement    KNEE SURGERY Bilateral 05/2011    TKR, had ligament replacement in right , meniscus repair both knees    LAMINECTOMY  1989    spinal    LEFT HEART CATHETERIZATION Left 06/04/2024    Procedure: Left heart cath;  Surgeon: Raji Noble MD;  Location: [facility] CATH LAB/EP;  Service: Cardiology;  Laterality: Left;    PERCUTANEOUS CORONARY  INTERVENTION, ARTERY N/A 06/04/2024    Procedure: Percutaneous coronary intervention;  Surgeon: Raji Noble MD;  Location: [facility] CATH LAB/EP;  Service: Cardiology;  Laterality: N/A;    PTCA, SINGLE VESSEL  06/04/2024    Procedure: PTCA, Single Vessel;  Surgeon: Raji Noble MD;  Location: [facility] CATH LAB/EP;  Service: Cardiology;;    SINUS SURGERY  2012?    Balloon sinuplasty    SLEEP ENDOSCOPY, DRUG-INDUCED N/A 06/24/2025    Procedure: SLEEP ENDOSCOPY,DRUG-INDUCED;  Surgeon: Patrick Lane MD;  Location: [facility];  Service: ENT;  Laterality: N/A;    SPINE SURGERY  1991    Triple laminectomy    THYROIDECTOMY Bilateral 11/15/2024    Procedure: THYROIDECTOMY, TOTAL;  Surgeon: Vlad Avelar MD;  Location: [address];  Service: ENT;  Laterality: Bilateral;  Nerve monitoring booked at 7am on 11/15 (CL 10/21).    TONSILLECTOMY  2001    UPPP surgery    UVULOPALATOPHARYNGOPLASTY  2007    VASECTOMY  1985        Family History  His family history includes Arthritis in his mother; Asthma in his father; Cancer in his maternal grandfather and maternal grandmother; Drug abuse in his brother, brother, brother, and brother; Heart disease in his father; Heart failure in his brother and father; Migraines in his mother; Osteoarthritis in his mother; Stroke in his mother.    Social History  He reports that he has never smoked. He has never used smokeless tobacco. He reports current alcohol use of about 11.0 standard drinks of alcohol per week. He reports that he does not use drugs.    Allergies  He is allergic to hay fever and allergy relief.    Medications  He has a current medication list which includes the following prescription(s): alpha lipoic acid, amlodipine, aspirin, clopidogrel, coq10 (ubiquinol), creatine (bulk), finasteride, fluticasone propionate, hydrocodone-acetaminophen, levothyroxine, losartan-hydrochlorothiazide 100-12.5 mg, metformin, mirabegron, multivit with minerals/lutein, nitroglycerin,  fish oil-omega-3 fatty acids, ondansetron, ondansetron, and rosuvastatin.    ROS:  Pertinent positive and negative review of systems as noted in HPI.     Objective:     /76 (BP Location: Left arm, Patient Position: Sitting)   Pulse 80   Wt 111.2 kg (245 lb 3.2 oz)   BMI 33.26 kg/m²    Physical Exam  Tongue symmetric   Neck ROM normal   Neck and chest healing well without drainage, dehiscence, or fluid accumulation. Some normal bruising and swelling.    Data Review:       Procedures:       Assessment:     No diagnosis found.    Plan:     I had a long discussion with the patient regarding his condition and the further workup and management options. He is doing well post op. We discussed that metallic taste in mouth should improve over time. Reviewed post op instructions. Activation is scheduled for 8/28.     No orders of the defined types were placed in this encounter.         Problem List Items Addressed This Visit    None

## 2025-08-05 ENCOUNTER — TELEPHONE (OUTPATIENT)
Dept: ENDOSCOPY | Facility: HOSPITAL | Age: 70
End: 2025-08-05
Payer: MEDICARE

## 2025-08-05 DIAGNOSIS — Z12.11 COLON CANCER SCREENING: Primary | ICD-10-CM

## 2025-08-05 RX ORDER — SODIUM, POTASSIUM,MAG SULFATES 17.5-3.13G
1 SOLUTION, RECONSTITUTED, ORAL ORAL DAILY
Qty: 1 KIT | Refills: 0 | Status: SHIPPED | OUTPATIENT
Start: 2025-08-05 | End: 2025-08-07

## 2025-08-05 NOTE — TELEPHONE ENCOUNTER
Copied from CRM #[id number]. Topic: Appointments - Appointment Access  >> Aug 5, 2025  1:27 PM Messi wrote:  Scheduling Request           Appt Type:  ep     Date/Time Preference:October after first week     Treating Provider: Dr. Saxena     Caller Name:pt     Contact Preference:[phone]      Comment: in regards to scheduling Colonoscopy in October. Please call to advise thank you  >> Aug 5, 2025  1:43 PM GRACE Andres wrote:    ----- Message -----  From: Messi Rogers  Sent: 8/5/2025   1:29 PM CDT  To: Hemanth Urias Staff

## 2025-08-05 NOTE — TELEPHONE ENCOUNTER
Patient is scheduled for a Colonoscopy on 10/17/25 with Dr. AVINASH Saxena  Referral for procedure from Clinic visit    Pt is currently taking Plavix (clopidogrel). Message sent to Endoscopy clearance nurse per protocol to submit Plavix (clopidogrel) hold.

## 2025-08-18 RX ORDER — MIRABEGRON 50 MG/1
TABLET, FILM COATED, EXTENDED RELEASE ORAL
Qty: 90 TABLET | Refills: 3 | Status: SHIPPED | OUTPATIENT
Start: 2025-08-18

## 2025-08-19 ENCOUNTER — PATIENT MESSAGE (OUTPATIENT)
Dept: OTOLARYNGOLOGY | Facility: CLINIC | Age: 70
End: 2025-08-19
Payer: MEDICARE

## 2025-08-19 RX ORDER — SULFAMETHOXAZOLE AND TRIMETHOPRIM 800; 160 MG/1; MG/1
1 TABLET ORAL 2 TIMES DAILY
Qty: 20 TABLET | Refills: 0 | Status: SHIPPED | OUTPATIENT
Start: 2025-08-19 | End: 2025-08-29

## 2025-08-27 ENCOUNTER — TELEPHONE (OUTPATIENT)
Dept: OTOLARYNGOLOGY | Facility: CLINIC | Age: 70
End: 2025-08-27
Payer: MEDICARE

## 2025-08-27 ENCOUNTER — PATIENT MESSAGE (OUTPATIENT)
Dept: OTOLARYNGOLOGY | Facility: CLINIC | Age: 70
End: 2025-08-27
Payer: MEDICARE

## 2025-08-28 ENCOUNTER — OFFICE VISIT (OUTPATIENT)
Dept: OTOLARYNGOLOGY | Facility: CLINIC | Age: 70
End: 2025-08-28
Payer: MEDICARE

## 2025-08-28 DIAGNOSIS — G47.33 OSA (OBSTRUCTIVE SLEEP APNEA): ICD-10-CM

## 2025-08-28 DIAGNOSIS — Z96.82 S/P INSERTION OF HYPOGLOSSAL NERVE STIMULATOR: Primary | ICD-10-CM

## 2025-08-28 PROCEDURE — 3061F NEG MICROALBUMINURIA REV: CPT | Mod: CPTII,S$GLB,,

## 2025-08-28 PROCEDURE — 99213 OFFICE O/P EST LOW 20 MIN: CPT | Mod: S$GLB,,,

## 2025-08-28 PROCEDURE — 3066F NEPHROPATHY DOC TX: CPT | Mod: CPTII,S$GLB,,

## 2025-08-28 PROCEDURE — 95976 ALYS SMPL CN NPGT PRGRMG: CPT | Mod: S$GLB,,,

## 2025-08-28 PROCEDURE — 3044F HG A1C LEVEL LT 7.0%: CPT | Mod: CPTII,S$GLB,,

## 2025-08-28 RX ORDER — TRIAMCINOLONE ACETONIDE 1 MG/G
CREAM TOPICAL 2 TIMES DAILY
Qty: 15 G | Refills: 1 | Status: SHIPPED | OUTPATIENT
Start: 2025-08-28

## 2025-08-29 ENCOUNTER — PATIENT MESSAGE (OUTPATIENT)
Dept: PRIMARY CARE CLINIC | Facility: CLINIC | Age: 70
End: 2025-08-29
Payer: MEDICARE

## 2025-08-29 DIAGNOSIS — Z79.899 OTHER LONG TERM (CURRENT) DRUG THERAPY: Primary | ICD-10-CM

## (undated) DEVICE — HOOK LONE STAR BLUNT 12MM

## (undated) DEVICE — EVACUATOR WOUND BULB 100CC

## (undated) DEVICE — GUIDEWIRE EMERALD .035IN 260CM

## (undated) DEVICE — Device

## (undated) DEVICE — DISSECTOR LIGASURE EXACT 21CM

## (undated) DEVICE — PAD CAST SPECIALIST STRL 4

## (undated) DEVICE — TRAY SKIN SCRUB WET PREMIUM

## (undated) DEVICE — SUT LIGACLIP SMALL XTRA

## (undated) DEVICE — KIT INTRODUCER W/GUIDEWIRE

## (undated) DEVICE — TOURNIQUET SB QC DP 34X4IN

## (undated) DEVICE — SUT MONOCRYL 4-0 UND RB-1

## (undated) DEVICE — DRESSING TRANS 4X4 TEGADERM

## (undated) DEVICE — DRESSING TELFA N ADH 3X8

## (undated) DEVICE — GLOVE BIOGEL SKINSENSE PI 7.5

## (undated) DEVICE — ELECTRODE REM PLYHSV RETURN 9

## (undated) DEVICE — BNDG COFLEX FOAM LF2 ST 6X5YD

## (undated) DEVICE — STOCKINETTE UNBLEACHED 4X25D

## (undated) DEVICE — GUIDEWIRE RUNTHROUGH EF 180CM

## (undated) DEVICE — TOWEL OR DISP STRL BLUE 4/PK

## (undated) DEVICE — COVER PROBE US 5.5X58L NON LTX

## (undated) DEVICE — TUBING HPCIL ROT M/F ADPT 48IN

## (undated) DEVICE — HEMOSTAT VASC BAND LONG 27CM

## (undated) DEVICE — DRESSING GAUZE XEROFORM 5X9

## (undated) DEVICE — PAD UNDERPAD 30X30

## (undated) DEVICE — STAPLER SKIN PROXIMATE WIDE

## (undated) DEVICE — TUBE CONTRAST SQUEEZE

## (undated) DEVICE — KIT GLIDESHEATH SLEND 6FR 10CM

## (undated) DEVICE — DRAPE INSTR MAGNETIC 10X16IN

## (undated) DEVICE — SYR IRRIGATION BULB STER 60ML

## (undated) DEVICE — NDL 26.5 TAPR CRV XLOOP

## (undated) DEVICE — SEE MEDLINE ITEM 157171

## (undated) DEVICE — ELECTRODE BLADE INSULATED 1 IN

## (undated) DEVICE — STOCKINETTE BIAS CUT STRL 6X4Y

## (undated) DEVICE — SEE MEDLINE ITEM 157117

## (undated) DEVICE — PLASTER SPLINT FAST 5INX30IN

## (undated) DEVICE — SPIKE SHORT LG BORE 1-WAY 2IN

## (undated) DEVICE — GLOVE BIOGEL SKINSENSE PI 6.5

## (undated) DEVICE — TUBE EMG NIM 7.0MM TRIVANTAGE

## (undated) DEVICE — GUIDEWIRE WHOLEY STD STR 260CM

## (undated) DEVICE — SUT MONOCRYL PLUS UD 3-0 27

## (undated) DEVICE — VISE RADIFOCUS MULTI TORQUE

## (undated) DEVICE — SUT 3-0 12-18IN SILK

## (undated) DEVICE — CONTAINER SPECIMEN OR STER 4OZ

## (undated) DEVICE — SUT 2/0 30IN SILK BLK BRAI

## (undated) DEVICE — LOOP VESSEL YELLOW MAXI

## (undated) DEVICE — SPONGE COTTON TRAY 4X4IN

## (undated) DEVICE — SUT VICRYL PLUS 3-0 SH 18IN

## (undated) DEVICE — DRESSING XEROFORM GAUZE 5X9

## (undated) DEVICE — APPLIER CLIP LIAGCLIP 9.375IN

## (undated) DEVICE — SUT 2-0 12-18IN SILK

## (undated) DEVICE — DRAPE C-ARM MINI DISP

## (undated) DEVICE — DRAPE STERI INSTRUMENT 1018

## (undated) DEVICE — DRESSING TRANS 2X2 TEGADERM

## (undated) DEVICE — NDL HYPO 27G X 1 1/2

## (undated) DEVICE — SYR 10CC LUER LOCK

## (undated) DEVICE — APPLIER LIGACLIP SM 9.38IN

## (undated) DEVICE — SUT VICRYL 2-0 CT-2 VCP269H

## (undated) DEVICE — DRAPE INCISE IOBAN 2 23X17IN

## (undated) DEVICE — SUT ETHILON 3-0 PS2 18 BLK

## (undated) DEVICE — GLOVE BIOGEL SKINSENSE PI 7.0

## (undated) DEVICE — CATH OPTITORQUE TIGER 5F 100CM

## (undated) DEVICE — APPLICATOR CHLORAPREP ORN 26ML

## (undated) DEVICE — PROBE SIMULATOR KRAFF

## (undated) DEVICE — DRAPE ORTH SPLIT 77X108IN

## (undated) DEVICE — ADHESIVE DERMABOND ADVANCED

## (undated) DEVICE — TRAY MINOR GEN SURG OMC

## (undated) DEVICE — SEE MEDLINE ITEM 157131

## (undated) DEVICE — GAUZE SPONGE PEANUT STRL

## (undated) DEVICE — CLIP MED TICALL

## (undated) DEVICE — CATH NC EMERGE MR 4.50X20MM

## (undated) DEVICE — BANDAGE ESMARK ELASTIC ST 6X9

## (undated) DEVICE — NDL HYPO REG 25G X 1 1/2

## (undated) DEVICE — SEE MEDLINE ITEM 157216

## (undated) DEVICE — SPLINT PLASTER FAST SET 5X30IN

## (undated) DEVICE — CATH IMPULSE 5FR PIGTAIL 125CM

## (undated) DEVICE — STOCKINETTE BIAS CUT STRL 4X4Y

## (undated) DEVICE — ELECTRODE EMG NEEDLE

## (undated) DEVICE — VALVE CONTROL COPILOT

## (undated) DEVICE — SYR 12CC CNTRL L-L NO NDL

## (undated) DEVICE — CATH EAGLE EYE ST .014X20X150

## (undated) DEVICE — DRAPE ANGIO BRACH 38X44IN

## (undated) DEVICE — GUIDE LAUNCHER 6FR EBU 3.5

## (undated) DEVICE — SEE MEDLINE ITEM 156930

## (undated) DEVICE — DRAIN CHANNEL ROUND 15FR

## (undated) DEVICE — CATH EMERGE MR 12 X 3.00

## (undated) DEVICE — DRAPE EENT SPLIT STERILE

## (undated) DEVICE — DRAPE TOP 53X102IN

## (undated) DEVICE — SUT VICRYL 0 27 CT-2

## (undated) DEVICE — GAUZE SPONGE 4X4 12PLY

## (undated) DEVICE — SEE L#120831

## (undated) DEVICE — SOL NACL STRL BOTTLE 1000ML

## (undated) DEVICE — SUT MCRYL PLUS 4-0 PS2 27IN

## (undated) DEVICE — PROBE BPLR SIDE BY SIDE STIMTN

## (undated) DEVICE — VISIPAQUE 320 200ML +PK

## (undated) DEVICE — WRAP SELF ADH. COBAN 4X5YD

## (undated) DEVICE — DRAPE HALF SURGICAL 40X58IN

## (undated) DEVICE — BLADE SURG #15 CARBON STEEL

## (undated) DEVICE — ELECTRODE BLD EXT INSUL 1

## (undated) DEVICE — PAD CAST SPECIALIST STRL 6

## (undated) DEVICE — PAD DEFIB CADENCE ADULT R2

## (undated) DEVICE — GLOVE BIOGEL SKINSENSE PI 8.0

## (undated) DEVICE — BUCKET PLASTER DISPOSABLE

## (undated) DEVICE — LOOP VESSEL BLUE MINI 2/CARD

## (undated) DEVICE — PASSER

## (undated) DEVICE — GLOVE BIOGEL ECLIPSE SZ 8

## (undated) DEVICE — STOCKINET 1X25YD

## (undated) DEVICE — SPONGE KITTNER 1/4X 5/8 L STRL

## (undated) DEVICE — CATH IV CATHLON W/HUB14GAX

## (undated) DEVICE — BLADE TONGUE DEPRESSOR STRL

## (undated) DEVICE — CATH ANGIOSCULPT EVO 2.5X10MM

## (undated) DEVICE — FIBRILLAR ABS HEMOSTAT 4X4

## (undated) DEVICE — MARKER SKIN STND TIP BLUE BARR

## (undated) DEVICE — SPONGE PATTY SURGICAL .5X3IN

## (undated) DEVICE — STAPLER SKIN ROTATING HEAD

## (undated) DEVICE — GOWN SURGICAL X-LARGE

## (undated) DEVICE — BNDG COFLEX FOAM LF2 ST 4X5YD

## (undated) DEVICE — SPONGE LAP 18X18 PREWASHED

## (undated) DEVICE — MARKER FN REG DUAL UTIL RULER

## (undated) DEVICE — CATH NC EMERGE MR 4X12MM

## (undated) DEVICE — CORD BIPOLAR 12 FOOT

## (undated) DEVICE — PAD CAST SPECIALIST STRL 3

## (undated) DEVICE — KIT LEFT HEART MANIFOLD CUSTOM

## (undated) DEVICE — UNDERGLOVES BIOGEL PI SZ 7 LF

## (undated) DEVICE — DRESSING XEROFORM FOIL PK 1X8

## (undated) DEVICE — INFLATOR ENCORE 26 BLLN INFL

## (undated) DEVICE — BLADE SURG STAINLESS STEEL #15

## (undated) DEVICE — SOL ALCOHOL ISO 70% WNTGR 16OZ